# Patient Record
Sex: MALE | Race: WHITE | Employment: OTHER | URBAN - METROPOLITAN AREA
[De-identification: names, ages, dates, MRNs, and addresses within clinical notes are randomized per-mention and may not be internally consistent; named-entity substitution may affect disease eponyms.]

---

## 2017-11-02 ENCOUNTER — OFFICE VISIT (OUTPATIENT)
Dept: FAMILY MEDICINE | Facility: CLINIC | Age: 62
End: 2017-11-02

## 2017-11-02 VITALS
SYSTOLIC BLOOD PRESSURE: 112 MMHG | HEIGHT: 67 IN | DIASTOLIC BLOOD PRESSURE: 68 MMHG | RESPIRATION RATE: 18 BRPM | OXYGEN SATURATION: 97 % | WEIGHT: 240 LBS | TEMPERATURE: 98.6 F | BODY MASS INDEX: 37.67 KG/M2 | HEART RATE: 77 BPM

## 2017-11-02 DIAGNOSIS — R05.9 COUGH: Primary | ICD-10-CM

## 2017-11-02 DIAGNOSIS — J20.9 ACUTE BRONCHITIS, UNSPECIFIED ORGANISM: ICD-10-CM

## 2017-11-02 DIAGNOSIS — H72.91 PERFORATED EAR DRUM, RIGHT: ICD-10-CM

## 2017-11-02 DIAGNOSIS — H91.91 HEARING PROBLEM, RIGHT: ICD-10-CM

## 2017-11-02 PROCEDURE — 99214 OFFICE O/P EST MOD 30 MIN: CPT | Performed by: FAMILY MEDICINE

## 2017-11-02 NOTE — PATIENT INSTRUCTIONS
Acute Bronchitis  Your healthcare provider has told you that you have acute bronchitis. Bronchitis is infection or inflammation of the bronchial tubes (airways in the lungs). Normally, air moves easily in and out of the airways. Bronchitis narrows the airways, making it harder for air to flow in and out of the lungs. This causes symptoms such as shortness of breath, coughing up yellow or green mucus, and wheezing. Bronchitis can be acute or chronic. Acute means the condition comes on quickly and goes away in a short time, usually within 3 to 10 days. Chronic means a condition lasts a long time and often comes back.    What causes acute bronchitis?  Acute bronchitis almost always starts as a viral respiratory infection, such as a cold or the flu. Certain factors make it more likely for a cold or flu to turn into bronchitis. These include being very young, being elderly, having a heart or lung problem, or having a weak immune system. Cigarette smoking also makes bronchitis more likely.  When bronchitis develops, the airways become swollen. The airways may also become infected with bacteria. This is known as a secondary infection.  Diagnosing acute bronchitis  Your healthcare provider will examine you and ask about your symptoms and health history. You may also have a sputum culture to test the fluid in your lungs. Chest X-rays may be done to look for infection in the lungs.  Treating acute bronchitis  Bronchitis usually clears up as the cold or flu goes away. You can help feel better faster by doing the following:    Take medicine as directed. You may be told to take ibuprofen or other over-the-counter medicines. These help relieve inflammation in your bronchial tubes. Your healthcare provider may prescribe an inhaler to help open up the bronchial tubes. Most of the time, acute bronchitis is caused by a viral infection. Antibiotics are usually not prescribed for viral infections.    Drink plenty of fluids, such as  water, juice, or warm soup. Fluids loosen mucus so that you can cough it up. This helps you breathe more easily. Fluids also prevent dehydration.    Make sure you get plenty of rest.    Do not smoke. Do not allow anyone else to smoke in your home.  Recovery and follow-up  Follow up with your doctor as you are told. You will likely feel better in a week or two. But a dry cough can linger beyond that time. Let your doctor know if you still have symptoms (other than a dry cough) after 2 weeks, or if you re prone to getting bronchial infections. Take steps to protect yourself from future infections. These steps include stopping smoking and avoiding tobacco smoke, washing your hands often, and getting a yearly flu shot.  When to call your healthcare provider  Call the healthcare provider if you have any of the following:    Fever of 100.4 F (38.0 C) or higher, or as advised    Symptoms that get worse, or new symptoms    Trouble breathing    Symptoms that don t start to improve within a week, or within 3 days of taking antibiotics   Date Last Reviewed: 12/1/2016 2000-2017 [employer]. [address] [place] [address]. All rights reserved. This information is not intended as a substitute for professional medical care. Always follow your healthcare professional's instructions.        Ruptured Eardrum, Traumatic    The eardrum is a thin membrane about one inch from the opening of the ear canal. It is easily injured by objects put into the ear canal. It may also be injured by a loud noise close to the ear or a slap to the ear. A ruptured eardrum will cause pain. There may be some clear or bloody drainage. A buzzing sound may be heard in the ear. Some hearing may be lost the affected ear.  The goal is to keep the ear dry and clean until the eardrum heals. Antibiotics may be prescribed if infection is present. Follow-up with ear and hearing specialists is advised. In many cases, hearing returns to  normal after the eardrum heals.  Home care    Keep a cotton ball in the ear to keep it clean and protect the inner ear.    Do not use eardrops unless prescribed by the healthcare provider.    Do not get water in your ear when showering or bathing. No swimming until approved by the healthcare provider.    Take any prescription or over-the-counter medicines as advised.  Follow-up care  Follow up with specialists for test or exams as directed. A hearing test should be done soon after the injury. Also follow up within 2 weeks to check healing of the eardrum.  When to seek medical advice  Fever in children:    Child is 3 months old or younger and has a fever of 100.4 F (38 C) or higher. (Get medical care right away. Fever in a young baby can be a sign of a dangerous infection.)    Child is younger than 2 years of age and has a fever of 100.4 F (38 C) that continues for more than 1 day.    Child is 2 years old or older and has a fever of 100.4 F (38 C) that continues for more than 3 days.    Child is of any age and has repeated fevers above 104 F (40 C).  Fever in adults:    Fever of 100.4 F (38 C)  Also call for any of the following:    Fluid drainage from the ear for longer than 24 hours    Increasing ear pain    Worsening headache or dizziness    Worsening hearing loss  Date Last Reviewed: 5/3/2015    7290-5999 [employer]. [address] [place] [address]. All rights reserved. This information is not intended as a substitute for professional medical care. Always follow your healthcare professional's instructions.

## 2017-11-02 NOTE — MR AVS SNAPSHOT
After Visit Summary   11/2/2017    Andre Serrano    MRN: [id number]           Patient Information     Date Of Birth          1955        Visit Information        Provider Department      11/2/2017 11:45 AM Truong Hoyt MD [facility]        Today's Diagnoses     Cough    -  1    Hearing problem, right        Perforated ear drum, right        Acute bronchitis, unspecified organism          Care Instructions      Acute Bronchitis  Your healthcare provider has told you that you have acute bronchitis. Bronchitis is infection or inflammation of the bronchial tubes (airways in the lungs). Normally, air moves easily in and out of the airways. Bronchitis narrows the airways, making it harder for air to flow in and out of the lungs. This causes symptoms such as shortness of breath, coughing up yellow or green mucus, and wheezing. Bronchitis can be acute or chronic. Acute means the condition comes on quickly and goes away in a short time, usually within 3 to 10 days. Chronic means a condition lasts a long time and often comes back.    What causes acute bronchitis?  Acute bronchitis almost always starts as a viral respiratory infection, such as a cold or the flu. Certain factors make it more likely for a cold or flu to turn into bronchitis. These include being very young, being elderly, having a heart or lung problem, or having a weak immune system. Cigarette smoking also makes bronchitis more likely.  When bronchitis develops, the airways become swollen. The airways may also become infected with bacteria. This is known as a secondary infection.  Diagnosing acute bronchitis  Your healthcare provider will examine you and ask about your symptoms and health history. You may also have a sputum culture to test the fluid in your lungs. Chest X-rays may be done to look for infection in the lungs.  Treating acute bronchitis  Bronchitis usually clears up as the cold or flu goes away. You can help  feel better faster by doing the following:    Take medicine as directed. You may be told to take ibuprofen or other over-the-counter medicines. These help relieve inflammation in your bronchial tubes. Your healthcare provider may prescribe an inhaler to help open up the bronchial tubes. Most of the time, acute bronchitis is caused by a viral infection. Antibiotics are usually not prescribed for viral infections.    Drink plenty of fluids, such as water, juice, or warm soup. Fluids loosen mucus so that you can cough it up. This helps you breathe more easily. Fluids also prevent dehydration.    Make sure you get plenty of rest.    Do not smoke. Do not allow anyone else to smoke in your home.  Recovery and follow-up  Follow up with your doctor as you are told. You will likely feel better in a week or two. But a dry cough can linger beyond that time. Let your doctor know if you still have symptoms (other than a dry cough) after 2 weeks, or if you re prone to getting bronchial infections. Take steps to protect yourself from future infections. These steps include stopping smoking and avoiding tobacco smoke, washing your hands often, and getting a yearly flu shot.  When to call your healthcare provider  Call the healthcare provider if you have any of the following:    Fever of 100.4 F (38.0 C) or higher, or as advised    Symptoms that get worse, or new symptoms    Trouble breathing    Symptoms that don t start to improve within a week, or within 3 days of taking antibiotics   Date Last Reviewed: 12/1/2016 2000-2017 [employer]. [address] [place] [address], [place] [address]. All rights reserved. This information is not intended as a substitute for professional medical care. Always follow your healthcare professional's instructions.        Ruptured Eardrum, Traumatic    The eardrum is a thin membrane about one inch from the opening of the ear canal. It is easily injured by objects put into the ear canal. It may  also be injured by a loud noise close to the ear or a slap to the ear. A ruptured eardrum will cause pain. There may be some clear or bloody drainage. A buzzing sound may be heard in the ear. Some hearing may be lost the affected ear.  The goal is to keep the ear dry and clean until the eardrum heals. Antibiotics may be prescribed if infection is present. Follow-up with ear and hearing specialists is advised. In many cases, hearing returns to normal after the eardrum heals.  Home care    Keep a cotton ball in the ear to keep it clean and protect the inner ear.    Do not use eardrops unless prescribed by the healthcare provider.    Do not get water in your ear when showering or bathing. No swimming until approved by the healthcare provider.    Take any prescription or over-the-counter medicines as advised.  Follow-up care  Follow up with specialists for test or exams as directed. A hearing test should be done soon after the injury. Also follow up within 2 weeks to check healing of the eardrum.  When to seek medical advice  Fever in children:    Child is 3 months old or younger and has a fever of 100.4 F (38 C) or higher. (Get medical care right away. Fever in a young baby can be a sign of a dangerous infection.)    Child is younger than 2 years of age and has a fever of 100.4 F (38 C) that continues for more than 1 day.    Child is 2 years old or older and has a fever of 100.4 F (38 C) that continues for more than 3 days.    Child is of any age and has repeated fevers above 104 F (40 C).  Fever in adults:    Fever of 100.4 F (38 C)  Also call for any of the following:    Fluid drainage from the ear for longer than 24 hours    Increasing ear pain    Worsening headache or dizziness    Worsening hearing loss  Date Last Reviewed: 5/3/2015    5922-9942 [employer]. [address] [place] [address], [place] [address]. All rights reserved. This information is not intended as a substitute for professional medical care.  "Always follow your healthcare professional's instructions.                Follow-ups after your visit        Additional Services     Referral to [facility]       Referral to [facility]  Phone:  [phone]  Reason for Referral:  Dr Perez, Dr Schwartz, Dr Ordonez     Please be aware that coverage of these services is subject to the terms and limitations of your health insurance plan.  Call member services at your health plan with any benefit or coverage questions.                  Who to contact     If you have questions or need follow up information about today's clinic visit or your schedule please contact [facility] directly at [phone].  Normal or non-critical lab and imaging results will be communicated to you by [employer], letter or phone within 4 business days after the clinic has received the results. If you do not hear from us within 7 days, please contact the clinic through [employer] or phone. If you have a critical or abnormal lab result, we will notify you by phone as soon as possible.  Submit refill requests through [employer] or call your pharmacy and they will forward the refill request to us. Please allow 3 business days for your refill to be completed.          Additional Information About Your Visit        [employer] Information     [employer] lets you send messages to your doctor, view your test results, renew your prescriptions, schedule appointments and more. To sign up, go to [email or web address] . Click on \"Log in\" on the left side of the screen, which will take you to the Welcome page. Then click on \"Sign up Now\" on the right side of the page.     You will be asked to enter the access code listed below, as well as some personal information. Please follow the directions to create your username and password.     Your access code is: [email or web address]  Expires: 2018 12:33 PM     Your access code will  in 90 days. If " "you need help or a new code, please call your [facility] clinic or [phone].        Care EveryWhere ID     This is your Care EveryWhere ID. This could be used by other organizations to access your [facility] medical records  [id number]        Your Vitals Were     Pulse Temperature Respirations Height Pulse Oximetry BMI (Body Mass Index)    77 98.6  F (37  C) (Oral) 18 1.695 m (5' 6.75\") 97% 37.87 kg/m2       Blood Pressure from Last 3 Encounters:   11/02/17 112/68   06/25/15 98/62   04/18/11 112/66    Weight from Last 3 Encounters:   11/02/17 108.9 kg (240 lb)   06/25/15 104.2 kg (229 lb 12.8 oz)   04/18/11 104.7 kg (230 lb 12.8 oz)              We Performed the Following     Referral to [facility]          Today's Medication Changes          These changes are accurate as of: 11/2/17 12:33 PM.  If you have any questions, ask your nurse or doctor.               Start taking these medicines.        Dose/Directions    amoxicillin-clavulanate 875-125 MG per tablet   Commonly known as:  AUGMENTIN   Used for:  Perforated ear drum, right, Cough, Acute bronchitis, unspecified organism   Started by:  Truong Hoyt MD        Dose:  1 tablet   Take 1 tablet by mouth 2 times daily   Quantity:  28 tablet   Refills:  0            Where to get your medicines      These medications were sent to [facility] #[id number] - [address] [address]     Phone:  [phone]     amoxicillin-clavulanate 875-125 MG per tablet                Primary Care Provider Office Phone # Fax #    Chacho Galan [phone] [address]  [facility] [address]        Equal Access to Services     DAVID AUGUSTINE AH: Enoch salgado Soabraham, waaxda luqadaha, qaybta kaalmafatemeh mora, sanjeev obregon. [facility] [phone].    ATENCIÓN: Si habla español, tiene a juarez disposición servicios " ashli de asistencia lingüística. Linette richey [phone].    We comply with applicable federal civil rights laws and [place] laws. We do not discriminate on the basis of race, color, national origin, age, disability, sex, sexual orientation, or gender identity.            Thank you!     Thank you for choosing [facility]  for your care. Our goal is always to provide you with excellent care. Hearing back from our patients is one way we can continue to improve our services. Please take a few minutes to complete the written survey that you may receive in the mail after your visit with us. Thank you!             Your Updated Medication List - Protect others around you: Learn how to safely use, store and throw away your medicines at www.disposemymeds.org.          This list is accurate as of: 11/2/17 12:33 PM.  Always use your most recent med list.                   Brand Name Dispense Instructions for use Diagnosis    amoxicillin-clavulanate 875-125 MG per tablet    AUGMENTIN    28 tablet    Take 1 tablet by mouth 2 times daily    Perforated ear drum, right, Cough, Acute bronchitis, unspecified organism       atorvastatin 20 MG tablet    LIPITOR    90 tablet    Take one po every pm.    Elevated cholesterol       NO ACTIVE MEDICATIONS

## 2017-11-02 NOTE — PROGRESS NOTES
Subjective:  Here to discuss the status of the following problem(s):(Unless noted the problem(s) is/are stable and if applicable the medicine(s) being used is/are causing no side effects or problems.)    CC: URI (x2 weeks - wet cough, blood (from ears) on pillow when woke up 5 days ago, very stuffy)      1. Cough  Cough nasal discharge that is green and lots of mucous  Gets coughing jags   Severe coughing  Jags  Tried otc cold preps     2. Hearing problem, right  Had blood fronm right ear several days ago     flys in two week to  [place]    ROS:  General:  NEGATIVE for fever, chills, change in weight HEENT:  SEE HPI. Allergies have been an issue before    No past medical history on file.  Current Outpatient Prescriptions   Medication     atorvastatin (LIPITOR) 20 MG tablet     NO ACTIVE MEDICATIONS     No current facility-administered medications for this visit.       reports that he is a non-smoker but has been exposed to tobacco smoke. He has never used smokeless tobacco. He reports that he drinks about 10.0 oz of alcohol per week  He reports that he does not use illicit drugs.      EXAM:  BP: 112/68   Pulse: 77      Wt Readings from Last 2 Encounters:   11/02/17 108.9 kg (240 lb)   06/25/15 104.2 kg (229 lb 12.8 oz)       BMI= Body mass index is 37.87 kg/(m^2).    EXAM:   APPEARANCE: = alert, no distress and slightly [place]  Conj/Eyelids = Nrl  Ears/Nose = Nrl  He can hear not tones with audiometer today  Ear canals and TM's = perforated right tm with dry blood on tm. Left ear has retracted tm but appears clear  Oropharynx = Nrl  Neck = Nrl  Resp effort = Nrl  Breath Sounds = Nrl  Heart Rate, Rythym, & sounds (no Murm)  = Nrl  Digits and Nails =Nrl  SKIN absent significant rashes or lesions = Nrl  Recent/Remote Memory = Nrl  Mood/Affect = Nrl    Assessment/Plan:  Andre was seen today for uri.    Diagnoses and all orders for this visit:        (H72.91) Perforated ear drum, right  Keep water out of ear no  swimming  Plan: Referral to [facility]         Head/Neck Surgery,     (J20.9) Acute bronchitis, unspecified organism  Plan: amoxicillin-clavulanate (AUGMENTIN) 875-125 MG         per tablet      (H91.91) Hearing problem, right and left  Plan: Referral to [facility]         Head/Neck Surgery           He will be flying soon and that is concerning with his hearing loss and this URI. See ent for guidance on above ent issues. He  Agrees.          Truong Hoyt  [facility]

## 2017-11-10 ENCOUNTER — TRANSFERRED RECORDS (OUTPATIENT)
Dept: FAMILY MEDICINE | Facility: CLINIC | Age: 62
End: 2017-11-10

## 2017-11-16 ENCOUNTER — TRANSFERRED RECORDS (OUTPATIENT)
Dept: FAMILY MEDICINE | Facility: CLINIC | Age: 62
End: 2017-11-16

## 2019-09-10 ENCOUNTER — OFFICE VISIT (OUTPATIENT)
Dept: FAMILY MEDICINE | Facility: CLINIC | Age: 64
End: 2019-09-10

## 2019-09-10 VITALS
HEART RATE: 68 BPM | OXYGEN SATURATION: 96 % | SYSTOLIC BLOOD PRESSURE: 110 MMHG | DIASTOLIC BLOOD PRESSURE: 78 MMHG | RESPIRATION RATE: 16 BRPM | WEIGHT: 236.5 LBS | BODY MASS INDEX: 37.32 KG/M2

## 2019-09-10 DIAGNOSIS — R97.20 BPH WITH ELEVATED PSA: ICD-10-CM

## 2019-09-10 DIAGNOSIS — Z13.1 SCREENING FOR DIABETES MELLITUS: ICD-10-CM

## 2019-09-10 DIAGNOSIS — N40.0 BPH WITH ELEVATED PSA: ICD-10-CM

## 2019-09-10 DIAGNOSIS — R35.1 NOCTURIA: ICD-10-CM

## 2019-09-10 DIAGNOSIS — G47.33 OSA (OBSTRUCTIVE SLEEP APNEA): ICD-10-CM

## 2019-09-10 DIAGNOSIS — E66.01 MORBID OBESITY (H): ICD-10-CM

## 2019-09-10 DIAGNOSIS — Z11.59 NEED FOR HEPATITIS C SCREENING TEST: ICD-10-CM

## 2019-09-10 DIAGNOSIS — Z00.00 ROUTINE GENERAL MEDICAL EXAMINATION AT A HEALTH CARE FACILITY: Primary | ICD-10-CM

## 2019-09-10 DIAGNOSIS — M19.90 ARTHRITIS: ICD-10-CM

## 2019-09-10 DIAGNOSIS — Z13.220 SCREENING FOR CHOLESTEROL LEVEL: ICD-10-CM

## 2019-09-10 LAB
BACTERIA URINE: 0
BILIRUB UR QL STRIP: 0
BLOOD URINE DIP: ABNORMAL
CASTS/LPF: 0
COLOR UR: YELLOW
CRYSTAL URINE: 0
EPITHELIAL CELLS - QUEST: ABNORMAL
GLUCOSE UR STRIP-MCNC: 0 MG/DL
KETONES UR QL STRIP: 0
LEUKOCYTE ESTERASE URINE DIP: 0
MUCOUS URINE: 0
NITRITE UR QL STRIP: ABNORMAL
PH UR STRIP: 5.5 PH (ref 5–9)
PROT UR QL: 0 MG/DL (ref ?–0.01)
RBC URINE: ABNORMAL (ref 0–3)
SP GR UR STRIP: 1.01 (ref 1–1.02)
SPERM: PRESENT
UROBILINOGEN UR QL STRIP: 1 EU/DL (ref 0.2–1)
WBC URINE: ABNORMAL (ref 0–3)

## 2019-09-10 PROCEDURE — 99396 PREV VISIT EST AGE 40-64: CPT | Mod: 25 | Performed by: FAMILY MEDICINE

## 2019-09-10 PROCEDURE — 84153 ASSAY OF PSA TOTAL: CPT | Mod: 90 | Performed by: FAMILY MEDICINE

## 2019-09-10 PROCEDURE — 90471 IMMUNIZATION ADMIN: CPT | Performed by: FAMILY MEDICINE

## 2019-09-10 PROCEDURE — 86618 LYME DISEASE ANTIBODY: CPT | Mod: 90 | Performed by: FAMILY MEDICINE

## 2019-09-10 PROCEDURE — 36415 COLL VENOUS BLD VENIPUNCTURE: CPT | Performed by: FAMILY MEDICINE

## 2019-09-10 PROCEDURE — 80053 COMPREHEN METABOLIC PANEL: CPT | Mod: 90 | Performed by: FAMILY MEDICINE

## 2019-09-10 PROCEDURE — 80061 LIPID PANEL: CPT | Mod: 90 | Performed by: FAMILY MEDICINE

## 2019-09-10 PROCEDURE — 86803 HEPATITIS C AB TEST: CPT | Mod: 90 | Performed by: FAMILY MEDICINE

## 2019-09-10 PROCEDURE — 90686 IIV4 VACC NO PRSV 0.5 ML IM: CPT | Performed by: FAMILY MEDICINE

## 2019-09-10 PROCEDURE — 81003 URINALYSIS AUTO W/O SCOPE: CPT | Performed by: FAMILY MEDICINE

## 2019-09-10 RX ORDER — TAMSULOSIN HYDROCHLORIDE 0.4 MG/1
0.4 CAPSULE ORAL DAILY
Qty: 30 CAPSULE | Refills: 0 | Status: SHIPPED | OUTPATIENT
Start: 2019-09-10 | End: 2020-03-20

## 2019-09-10 NOTE — PROGRESS NOTES
SUBJECTIVE:   CC: Andre Serrano is an 63 year old male who presents for preventive health visit.     Healthy Habits:    Do you get at least three servings of calcium containing foods daily (dairy, green leafy vegetables, etc.)? yes    Amount of exercise or daily activities, outside of work: 7 day(s) per week    Problems taking medications regularly No    Medication side effects: No    Have you had an eye exam in the past two years? no    Do you see a dentist twice per year? yes    Do you have sleep apnea, excessive snoring or daytime drowsiness?yes, Apnea    Today's PHQ-2 Score:   PHQ-2 ( 1999 [employer]izer) 6/25/2015   Q1: Little interest or pleasure in doing things 0   Q2: Feeling down, depressed or hopeless 0   PHQ-2 Score 0       Abuse: Current or Past(Physical, Sexual or Emotional)- No  Do you feel safe in your environment? Yes    Social History     Tobacco Use     Smoking status: Passive Smoke Exposure - Never Smoker     Smokeless tobacco: Never Used     Tobacco comment: occasion   Substance Use Topics     Alcohol use: Yes     Alcohol/week: 10.0 oz     Types: 20 Cans of beer per week     If you drink alcohol do you typically have >3 drinks per day or >7 drinks per week? No                      Last PSA: No results found for: PSA    Reviewed orders with patient. Reviewed health maintenance and updated orders accordingly - Yes  Labs reviewed in EPIC    Reviewed and updated as needed this visit by clinical staff       Reviewed and updated as needed this visit by Provider        ROS:  CONSTITUTIONAL: NEGATIVE for fever, chills, change in weight  INTEGUMENTARY/SKIN: NEGATIVE for worrisome rashes, moles or lesions  EYES: NEGATIVE for vision changes or irritation  ENT: NEGATIVE for ear, mouth and throat problems  RESP: NEGATIVE for significant cough or SOB  CV: NEGATIVE for chest pain, palpitations or peripheral edema  GI: NEGATIVE for nausea, abdominal pain, heartburn, or change in bowel habits  : + abn curvature,  "nocturia  MUSCULOSKELETAL:POSITIVE  for arthralgias joints  NEURO: NEGATIVE for weakness, dizziness or paresthesias  PSYCHIATRIC: NEGATIVE for changes in mood or affect    OBJECTIVE:   /78   Pulse 68   Resp 16   Wt 107.3 kg (236 lb 8 oz)   SpO2 96%   BMI 37.32 kg/m    EXAM:  GENERAL: healthy, alert and no distress  EYES: Eyes grossly normal to inspection, PERRL and conjunctivae and sclerae normal  HENT: ear canals and TM's normal, nose and mouth without ulcers or lesions  NECK: no adenopathy, no asymmetry, masses, or scars and thyroid normal to palpation  RESP: lungs clear to auscultation - no rales, rhonchi or wheezes  CV: regular rate and rhythm, normal S1 S2, no S3 or S4, no murmur, click or rub, no peripheral edema and peripheral pulses strong  ABDOMEN: soft, nontender, no hepatosplenomegaly, no masses and bowel sounds normal  MS: no gross musculoskeletal defects noted, no edema; boggy swollen knees  SKIN: no suspicious lesions or rashes  NEURO: Normal strength and tone, mentation intact and speech normal  PSYCH: mentation appears normal, affect normal/bright    Diagnostic Test Results:  Labs reviewed in Epic    ASSESSMENT/PLAN:       ICD-10-CM    1. Routine general medical examination at a health care facility Z00.00    2. Arthritis M19.90    3. History of elevated PSA Z87.898    4. Screening for cholesterol level Z13.220    5. Screening for diabetes mellitus Z13.1    6. Need for hepatitis C screening test Z11.59      COUNSELING:  Reviewed preventive health counseling, as reflected in patient instructions  Special attention given to:        Regular exercise       Healthy diet/nutrition    Estimated body mass index is 37.87 kg/m  as calculated from the following:    Height as of 11/2/17: 1.695 m (5' 6.75\").    Weight as of 11/2/17: 108.9 kg (240 lb).    Weight management plan: Patient referred to endocrine and/or weight management specialty     reports that he is a non-smoker but has been exposed to " tobacco smoke. He has never used smokeless tobacco.    Knee xrays ordered    Counseling Resources:  ATP IV Guidelines  Pooled Cohorts Equation Calculator  FRAX Risk Assessment  ICSI Preventive Guidelines  Dietary Guidelines for Americans, 2010  USDA's MyPlate  ASA Prophylaxis  Lung CA Screening    Anatoliy Claudio MD  [facility]

## 2019-09-12 LAB
ALBUMIN SERPL-MCNC: 4.5 G/DL (ref 3.6–4.8)
ALBUMIN/GLOB SERPL: 2 {RATIO} (ref 1.2–2.2)
ALP SERPL-CCNC: 58 IU/L (ref 39–117)
ALT SERPL-CCNC: 20 IU/L (ref 0–44)
AST SERPL-CCNC: 16 IU/L (ref 0–40)
BILIRUB SERPL-MCNC: 0.7 MG/DL (ref 0–1.2)
BUN SERPL-MCNC: 19 MG/DL (ref 8–27)
BUN/CREATININE RATIO: 24 (ref 10–24)
CALCIUM SERPL-MCNC: 9.5 MG/DL (ref 8.6–10.2)
CHLORIDE SERPLBLD-SCNC: 99 MMOL/L (ref 96–106)
CHOLEST SERPL-MCNC: 213 MG/DL (ref 100–199)
CREAT SERPL-MCNC: 0.8 MG/DL (ref 0.76–1.27)
EGFR IF AFRICN AM: 109 ML/MIN/1.73
EGFR IF NONAFRICN AM: 94 ML/MIN/1.73
GLOBULIN, TOTAL: 2.2 G/DL (ref 1.5–4.5)
GLUCOSE SERPL-MCNC: 105 MG/DL (ref 65–99)
HCV AB SERPL QL IA: <0.1 S/CO RATIO (ref 0–0.9)
HDLC SERPL-MCNC: 56 MG/DL
LDL/HDL RATIO: 2.3 RATIO (ref 0–3.6)
LDLC SERPL CALC-MCNC: 128 MG/DL (ref 0–99)
LYME IGG/IGM AB: <0.91 ISR (ref 0–0.9)
Lab: NO GROWTH
POTASSIUM SERPL-SCNC: 4.3 MMOL/L (ref 3.5–5.2)
PROT SERPL-MCNC: 6.7 G/DL (ref 6–8.5)
PSA NG/ML: 147.5 NG/ML (ref 0–4)
SODIUM SERPL-SCNC: 137 MMOL/L (ref 134–144)
TOTAL CO2: 21 MMOL/L (ref 20–29)
TRIGL SERPL-MCNC: 143 MG/DL (ref 0–149)
URINE CULTURE: NORMAL
VLDLC SERPL CALC-MCNC: 29 MG/DL (ref 5–40)

## 2019-09-16 ENCOUNTER — TRANSFERRED RECORDS (OUTPATIENT)
Dept: FAMILY MEDICINE | Facility: CLINIC | Age: 64
End: 2019-09-16

## 2019-09-17 ENCOUNTER — TELEPHONE (OUTPATIENT)
Dept: FAMILY MEDICINE | Facility: CLINIC | Age: 64
End: 2019-09-17

## 2019-09-25 NOTE — TELEPHONE ENCOUNTER
Patient returned phone call, results discussed per Dr Claudio. Patient advised to call and schedule appointment with urologist to evaluate increased PSA-he will call to do this. Dr Kolb contact information given to patient. Neda Stevenson

## 2020-01-21 ENCOUNTER — TRANSFERRED RECORDS (OUTPATIENT)
Dept: FAMILY MEDICINE | Facility: CLINIC | Age: 65
End: 2020-01-21

## 2020-03-20 ENCOUNTER — APPOINTMENT (OUTPATIENT)
Dept: CT IMAGING | Facility: CLINIC | Age: 65
DRG: 715 | End: 2020-03-20
Attending: NURSE PRACTITIONER
Payer: COMMERCIAL

## 2020-03-20 ENCOUNTER — HOSPITAL ENCOUNTER (INPATIENT)
Facility: CLINIC | Age: 65
LOS: 3 days | Discharge: HOME HEALTH | DRG: 715 | End: 2020-03-23
Attending: EMERGENCY MEDICINE | Admitting: HOSPITALIST
Payer: COMMERCIAL

## 2020-03-20 ENCOUNTER — OFFICE VISIT (OUTPATIENT)
Dept: FAMILY MEDICINE | Facility: CLINIC | Age: 65
End: 2020-03-20

## 2020-03-20 ENCOUNTER — APPOINTMENT (OUTPATIENT)
Dept: GENERAL RADIOLOGY | Facility: CLINIC | Age: 65
DRG: 715 | End: 2020-03-20
Attending: HOSPITALIST
Payer: COMMERCIAL

## 2020-03-20 VITALS
RESPIRATION RATE: 20 BRPM | SYSTOLIC BLOOD PRESSURE: 140 MMHG | WEIGHT: 238 LBS | HEIGHT: 67 IN | TEMPERATURE: 98.7 F | DIASTOLIC BLOOD PRESSURE: 90 MMHG | BODY MASS INDEX: 37.35 KG/M2

## 2020-03-20 DIAGNOSIS — N13.39 OTHER HYDRONEPHROSIS: Primary | ICD-10-CM

## 2020-03-20 DIAGNOSIS — R11.2 NAUSEA AND VOMITING, INTRACTABILITY OF VOMITING NOT SPECIFIED, UNSPECIFIED VOMITING TYPE: Primary | ICD-10-CM

## 2020-03-20 DIAGNOSIS — R10.9 FLANK PAIN: ICD-10-CM

## 2020-03-20 DIAGNOSIS — R59.1 LYMPHADENOPATHY: ICD-10-CM

## 2020-03-20 DIAGNOSIS — N17.9 ACUTE RENAL FAILURE, UNSPECIFIED ACUTE RENAL FAILURE TYPE (H): ICD-10-CM

## 2020-03-20 DIAGNOSIS — K59.00 CONSTIPATION, UNSPECIFIED CONSTIPATION TYPE: ICD-10-CM

## 2020-03-20 DIAGNOSIS — R33.9 URINARY RETENTION: ICD-10-CM

## 2020-03-20 PROBLEM — N13.30 HYDRONEPHROSIS: Status: ACTIVE | Noted: 2020-03-20

## 2020-03-20 LAB
% GRANULOCYTES: 86.5 % (ref 42.2–75.2)
A/C RATIO MG/G: ABNORMAL MG/G
ALBUMIN (URINE) MG/L: 10 MG/L
ALBUMIN SERPL-MCNC: 3.4 G/DL (ref 3.4–5)
ALBUMIN UR-MCNC: NEGATIVE MG/DL
ALP SERPL-CCNC: 83 U/L (ref 40–150)
ALT SERPL W P-5'-P-CCNC: 14 U/L (ref 0–70)
ANION GAP SERPL CALCULATED.3IONS-SCNC: 10 MMOL/L (ref 3–14)
APPEARANCE UR: CLEAR
AST SERPL W P-5'-P-CCNC: 8 U/L (ref 0–45)
BACTERIA URINE: NORMAL
BILIRUB SERPL-MCNC: 0.7 MG/DL (ref 0.2–1.3)
BILIRUB UR QL STRIP: NEGATIVE
BILIRUB UR QL STRIP: NORMAL
BLOOD URINE DIP: NORMAL
BUN SERPL-MCNC: 78 MG/DL (ref 7–30)
CALCIUM SERPL-MCNC: 8.9 MG/DL (ref 8.5–10.1)
CASTS/LPF: NORMAL
CHLORIDE SERPL-SCNC: 100 MMOL/L (ref 94–109)
CO2 SERPL-SCNC: 23 MMOL/L (ref 20–32)
COLOR UR AUTO: ABNORMAL
COLOR UR: YELLOW
CREAT SERPL-MCNC: 9.29 MG/DL (ref 0.66–1.25)
CRYSTAL URINE: NORMAL
EPITHELIAL CELLS - QUEST: NORMAL
GFR SERPL CREATININE-BSD FRML MDRD: 5 ML/MIN/{1.73_M2}
GLUCOSE SERPL-MCNC: 112 MG/DL (ref 70–99)
GLUCOSE UR STRIP-MCNC: NEGATIVE MG/DL
GLUCOSE UR STRIP-MCNC: NORMAL MG/DL
HCT VFR BLD AUTO: 40.9 % (ref 39–51)
HEMOGLOBIN: 14 G/DL (ref 13.4–17.5)
HGB UR QL STRIP: ABNORMAL
INTERPRETATION: ABNORMAL
KETONES UR QL STRIP: NORMAL
KETONES UR STRIP-MCNC: NEGATIVE MG/DL
LEUKOCYTE ESTERASE UR QL STRIP: NEGATIVE
LEUKOCYTE ESTERASE URINE DIP: NORMAL
LIPASE SERPL-CCNC: 198 U/L (ref 73–393)
LYMPHOCYTES NFR BLD AUTO: 9.8 % (ref 20.5–51.1)
MCH RBC QN AUTO: 32.3 PG (ref 27–31)
MCHC RBC AUTO-ENTMCNC: 34.2 G/DL (ref 33–37)
MCV RBC AUTO: 94.5 FL (ref 80–100)
MONOCYTES NFR BLD AUTO: 3.7 % (ref 1.7–9.3)
MUCOUS URINE: NORMAL
NITRATE UR QL: NEGATIVE
NITRITE UR QL STRIP: NORMAL
PH UR STRIP: 5.5 PH (ref 5–7)
PH UR STRIP: 6 PH (ref 5–9)
PLATELET # BLD AUTO: 306 K/UL (ref 140–450)
POTASSIUM SERPL-SCNC: 4.7 MMOL/L (ref 3.4–5.3)
PROT SERPL-MCNC: 7.1 G/DL (ref 6.8–8.8)
PROT UR QL: NORMAL MG/DL (ref ?–0.01)
RBC # BLD AUTO: 4.32 X10/CMM (ref 4.2–5.9)
RBC #/AREA URNS AUTO: 40 /HPF (ref 0–2)
RBC URINE: 0 (ref 0–3)
SODIUM SERPL-SCNC: 133 MMOL/L (ref 133–144)
SOURCE: ABNORMAL
SP GR UR STRIP: 1 (ref 1–1.02)
SP GR UR STRIP: 1.01 (ref 1–1.03)
URINE CREATININE MG/DL - QUEST: 50 MG/DL
UROBILINOGEN UR QL STRIP: 0.2 EU/DL (ref 0.2–1)
UROBILINOGEN UR STRIP-MCNC: NORMAL MG/DL (ref 0–2)
WBC # BLD AUTO: 14.6 X10/CMM (ref 3.8–11)
WBC #/AREA URNS AUTO: 2 /HPF (ref 0–5)
WBC URINE: NORMAL (ref 0–3)

## 2020-03-20 PROCEDURE — 80053 COMPREHEN METABOLIC PANEL: CPT | Mod: 90 | Performed by: NURSE PRACTITIONER

## 2020-03-20 PROCEDURE — 25800030 ZZH RX IP 258 OP 636: Performed by: HOSPITALIST

## 2020-03-20 PROCEDURE — 96365 THER/PROPH/DIAG IV INF INIT: CPT

## 2020-03-20 PROCEDURE — 71045 X-RAY EXAM CHEST 1 VIEW: CPT

## 2020-03-20 PROCEDURE — 25000128 H RX IP 250 OP 636: Performed by: NURSE PRACTITIONER

## 2020-03-20 PROCEDURE — 36415 COLL VENOUS BLD VENIPUNCTURE: CPT | Performed by: NURSE PRACTITIONER

## 2020-03-20 PROCEDURE — 81003 URINALYSIS AUTO W/O SCOPE: CPT | Performed by: NURSE PRACTITIONER

## 2020-03-20 PROCEDURE — 12000000 ZZH R&B MED SURG/OB

## 2020-03-20 PROCEDURE — 83690 ASSAY OF LIPASE: CPT | Performed by: NURSE PRACTITIONER

## 2020-03-20 PROCEDURE — 51702 INSERT TEMP BLADDER CATH: CPT

## 2020-03-20 PROCEDURE — 85025 COMPLETE CBC W/AUTO DIFF WBC: CPT | Performed by: NURSE PRACTITIONER

## 2020-03-20 PROCEDURE — 74176 CT ABD & PELVIS W/O CONTRAST: CPT

## 2020-03-20 PROCEDURE — 74018 RADEX ABDOMEN 1 VIEW: CPT | Mod: FY | Performed by: NURSE PRACTITIONER

## 2020-03-20 PROCEDURE — 99223 1ST HOSP IP/OBS HIGH 75: CPT | Mod: AI | Performed by: HOSPITALIST

## 2020-03-20 PROCEDURE — 25000132 ZZH RX MED GY IP 250 OP 250 PS 637: Performed by: HOSPITALIST

## 2020-03-20 PROCEDURE — 99214 OFFICE O/P EST MOD 30 MIN: CPT | Performed by: NURSE PRACTITIONER

## 2020-03-20 PROCEDURE — 96375 TX/PRO/DX INJ NEW DRUG ADDON: CPT

## 2020-03-20 PROCEDURE — 80053 COMPREHEN METABOLIC PANEL: CPT | Performed by: NURSE PRACTITIONER

## 2020-03-20 PROCEDURE — 82570 ASSAY OF URINE CREATININE: CPT | Mod: 90 | Performed by: NURSE PRACTITIONER

## 2020-03-20 PROCEDURE — 81001 URINALYSIS AUTO W/SCOPE: CPT | Performed by: NURSE PRACTITIONER

## 2020-03-20 PROCEDURE — 82044 UR ALBUMIN SEMIQUANTITATIVE: CPT | Performed by: NURSE PRACTITIONER

## 2020-03-20 PROCEDURE — 99285 EMERGENCY DEPT VISIT HI MDM: CPT | Mod: 25

## 2020-03-20 PROCEDURE — 87086 URINE CULTURE/COLONY COUNT: CPT | Performed by: HOSPITALIST

## 2020-03-20 RX ORDER — AMOXICILLIN 250 MG
2 CAPSULE ORAL 2 TIMES DAILY PRN
Status: DISCONTINUED | OUTPATIENT
Start: 2020-03-20 | End: 2020-03-23 | Stop reason: HOSPADM

## 2020-03-20 RX ORDER — CEFTRIAXONE 1 G/1
1 INJECTION, POWDER, FOR SOLUTION INTRAMUSCULAR; INTRAVENOUS EVERY 24 HOURS
Status: DISCONTINUED | OUTPATIENT
Start: 2020-03-21 | End: 2020-03-23

## 2020-03-20 RX ORDER — ATROPA BELLADONNA AND OPIUM 16.2; 3 MG/1; MG/1
30 SUPPOSITORY RECTAL EVERY 6 HOURS PRN
Status: DISCONTINUED | OUTPATIENT
Start: 2020-03-20 | End: 2020-03-23 | Stop reason: HOSPADM

## 2020-03-20 RX ORDER — NALOXONE HYDROCHLORIDE 0.4 MG/ML
.1-.4 INJECTION, SOLUTION INTRAMUSCULAR; INTRAVENOUS; SUBCUTANEOUS
Status: DISCONTINUED | OUTPATIENT
Start: 2020-03-20 | End: 2020-03-23 | Stop reason: HOSPADM

## 2020-03-20 RX ORDER — AMOXICILLIN 250 MG
1 CAPSULE ORAL 2 TIMES DAILY PRN
Status: DISCONTINUED | OUTPATIENT
Start: 2020-03-20 | End: 2020-03-23 | Stop reason: HOSPADM

## 2020-03-20 RX ORDER — AMOXICILLIN 250 MG
1 CAPSULE ORAL 2 TIMES DAILY
Status: DISCONTINUED | OUTPATIENT
Start: 2020-03-20 | End: 2020-03-23 | Stop reason: HOSPADM

## 2020-03-20 RX ORDER — HYDROMORPHONE HYDROCHLORIDE 1 MG/ML
0.5 INJECTION, SOLUTION INTRAMUSCULAR; INTRAVENOUS; SUBCUTANEOUS
Status: DISCONTINUED | OUTPATIENT
Start: 2020-03-20 | End: 2020-03-20

## 2020-03-20 RX ORDER — BISACODYL 10 MG
10 SUPPOSITORY, RECTAL RECTAL DAILY PRN
Status: DISCONTINUED | OUTPATIENT
Start: 2020-03-20 | End: 2020-03-23 | Stop reason: HOSPADM

## 2020-03-20 RX ORDER — SODIUM CHLORIDE 9 MG/ML
INJECTION, SOLUTION INTRAVENOUS CONTINUOUS
Status: DISCONTINUED | OUTPATIENT
Start: 2020-03-20 | End: 2020-03-22

## 2020-03-20 RX ORDER — ONDANSETRON 2 MG/ML
4 INJECTION INTRAMUSCULAR; INTRAVENOUS EVERY 6 HOURS PRN
Status: DISCONTINUED | OUTPATIENT
Start: 2020-03-20 | End: 2020-03-23 | Stop reason: HOSPADM

## 2020-03-20 RX ORDER — METOCLOPRAMIDE HYDROCHLORIDE 5 MG/5ML
2.5 SOLUTION ORAL EVERY 6 HOURS PRN
Status: DISCONTINUED | OUTPATIENT
Start: 2020-03-20 | End: 2020-03-23 | Stop reason: HOSPADM

## 2020-03-20 RX ORDER — METOCLOPRAMIDE HYDROCHLORIDE 5 MG/ML
2.5 INJECTION INTRAMUSCULAR; INTRAVENOUS EVERY 6 HOURS PRN
Status: DISCONTINUED | OUTPATIENT
Start: 2020-03-20 | End: 2020-03-23 | Stop reason: HOSPADM

## 2020-03-20 RX ORDER — ONDANSETRON 4 MG/1
4 TABLET, ORALLY DISINTEGRATING ORAL EVERY 6 HOURS PRN
Status: DISCONTINUED | OUTPATIENT
Start: 2020-03-20 | End: 2020-03-23 | Stop reason: HOSPADM

## 2020-03-20 RX ORDER — LIDOCAINE 40 MG/G
CREAM TOPICAL
Status: DISCONTINUED | OUTPATIENT
Start: 2020-03-20 | End: 2020-03-23 | Stop reason: HOSPADM

## 2020-03-20 RX ORDER — CEFTRIAXONE 1 G/1
1 INJECTION, POWDER, FOR SOLUTION INTRAMUSCULAR; INTRAVENOUS ONCE
Status: COMPLETED | OUTPATIENT
Start: 2020-03-20 | End: 2020-03-20

## 2020-03-20 RX ORDER — PROCHLORPERAZINE MALEATE 5 MG
5 TABLET ORAL EVERY 6 HOURS PRN
Status: DISCONTINUED | OUTPATIENT
Start: 2020-03-20 | End: 2020-03-23 | Stop reason: HOSPADM

## 2020-03-20 RX ORDER — AMOXICILLIN 250 MG
2 CAPSULE ORAL 2 TIMES DAILY
Status: DISCONTINUED | OUTPATIENT
Start: 2020-03-20 | End: 2020-03-23 | Stop reason: HOSPADM

## 2020-03-20 RX ORDER — PROCHLORPERAZINE 25 MG
12.5 SUPPOSITORY, RECTAL RECTAL EVERY 12 HOURS PRN
Status: DISCONTINUED | OUTPATIENT
Start: 2020-03-20 | End: 2020-03-23 | Stop reason: HOSPADM

## 2020-03-20 RX ADMIN — SODIUM CHLORIDE, PRESERVATIVE FREE: 5 INJECTION INTRAVENOUS at 20:02

## 2020-03-20 RX ADMIN — SENNOSIDES AND DOCUSATE SODIUM 2 TABLET: 8.6; 5 TABLET ORAL at 20:02

## 2020-03-20 RX ADMIN — CEFTRIAXONE SODIUM 1 G: 1 INJECTION, POWDER, FOR SOLUTION INTRAMUSCULAR; INTRAVENOUS at 18:03

## 2020-03-20 RX ADMIN — HYDROMORPHONE HYDROCHLORIDE 0.5 MG: 1 INJECTION, SOLUTION INTRAMUSCULAR; INTRAVENOUS; SUBCUTANEOUS at 17:46

## 2020-03-20 ASSESSMENT — MIFFLIN-ST. JEOR
SCORE: 1823.19
SCORE: 1825.45

## 2020-03-20 ASSESSMENT — ENCOUNTER SYMPTOMS
CONSTIPATION: 1
ABDOMINAL PAIN: 1
NAUSEA: 1
CHILLS: 1
VOMITING: 1
SORE THROAT: 0
HEMATURIA: 0
COUGH: 0
SHORTNESS OF BREATH: 0
DIARRHEA: 0
FEVER: 0
BACK PAIN: 1

## 2020-03-20 ASSESSMENT — ACTIVITIES OF DAILY LIVING (ADL): ADLS_ACUITY_SCORE: 15

## 2020-03-20 NOTE — ED PROVIDER NOTES
"  History     Chief Complaint:  Abdominal Pain      HPI   Andre Serrano is a 64 year old male with a history of alcohol abuse who presents to the emergency department for evaluation of abdominal pain. The patient was recently in clinic and had blood work and an abdominal X ray performed. He was then sent here. The patient reports abdominal pain and left flank pain since Monday (3/16). The patient does have some urinary symptoms (unspecific), back pain, flank pain, constipation, chills, nausea, and vomiting. He has never had a kidney stone. The patient denies fever, sore throat, cough, shortness of breath, chest pain, ,hematuria or diarrhea. The patient has 3 beers and a few shots a day. He states that he has not drank in about a week.    Allergies:  No known drug allergies    Medications:    Lipitor  Flomax    Past Medical History:    FRANK  Obesity  Elevated PSA  Alcohol abuse  Obese    Past Surgical History:    The patient does not have any pertinent past surgical history.    Family History:    No past pertinent family history.    Social History:  The patient presents today alone.  Passive smoker exposure  Positive for alcohol use.   Positive for drug use.  Marital Status:  Single    Review of Systems   Constitutional: Positive for chills. Negative for fever.   HENT: Negative for sore throat.    Respiratory: Negative for cough and shortness of breath.    Cardiovascular: Negative for chest pain.   Gastrointestinal: Positive for abdominal pain, constipation, nausea and vomiting. Negative for diarrhea.   Genitourinary: Negative for hematuria.   Musculoskeletal: Positive for back pain.   All other systems reviewed and are negative.        Physical Exam     Patient Vitals for the past 24 hrs:   BP Temp Temp src Heart Rate Resp SpO2 Height Weight   03/20/20 1805 (!) 202/117 -- -- -- 20 99 % -- --   03/20/20 1550 (!) 182/100 98.6  F (37  C) Oral 80 18 99 % 1.727 m (5' 8\") 106.6 kg (235 lb)     Physical " Exam  General: Resting on the gurney   Eyes:  Conjunctivae and sclerae are normal  ENT:    The nose is normal    Pinnae are normal    The oropharynx is normal  Neck:  Normal range of motion    There is no rigidity noted  CV:  Regular rate and rhythm     No edema  Resp:  Lungs are clear    Non-labored    No rales    No wheezing   GI:  Abdomen is soft, there is no rigidity    Denies any CVA tenderness    Denies any blood in urine    No rebound tenderness     C/O constipation  MS:  Normal muscular tone  Skin:  No rash or acute skin lesions noted  Neuro:   Awake, alert.      Speech is normal and fluent.    Face is symmetric.     Moves all extremities  Psych:  Normal affect.  Appropriate interactions.      Emergency Department Course         Imaging:  CT Abdomen Pelvis w/o Contrast   Final Result   IMPRESSION:    1.  Moderate bilateral hydroureteronephrosis extending to the level of   the distal ureters without focal transition point. There is moderate   urinary bladder retention and the hydronephrosis may be due to urinary   bladder outflow compromise/obstruction from the prostate (high   suspicion for prostate malignancy).   2.  Asymmetric left nephric stranding and edema is concerning for   superimposed ascending urinary tract infection.   3.  Retroperitoneal and iliac lymphadenopathy consistent with doc   metastatic disease and is favored to be from a primary prostate   malignancy. Additional differential considerations include rectal   malignancy, lymphoma, urothelial malignancy given the irregular   thickening in the posterior urinary bladder wall that is inseparable   from the prostate. Note that multiple lymph nodes are adjacent to the   ureters, however there is no high-grade ureteral caliber change to   suggest that the lymphadenopathy is a source of the obstruction.   4.  Nodularity in the mesial rectal fascia and lymphadenopathy along   the inferior mesenteric vasculature, is concerning for a component of    extramural vascular invasion.   5.  Sclerotic osseous metastatic disease.   6.  A few micronodules in the visualized lung bases, pulmonary   metastatic disease is not excluded.      MALCOLM ALANIS MD          Laboratory:  Labs Ordered and Resulted from Time of ED Arrival Up to the Time of Departure from the ED   COMPREHENSIVE METABOLIC PANEL - Abnormal; Notable for the following components:       Result Value    Glucose 112 (*)     Urea Nitrogen 78 (*)     Creatinine 9.29 (*)     GFR Estimate 5 (*)     GFR Estimate If Black 6 (*)     All other components within normal limits   LIPASE   UA MACROSCOPIC WITH REFLEX TO MICRO AND CULTURE   PERIPHERAL IV CATHETER   IP ACUTE INDWELLING URINARY CATHETER ([place])     Interventions:    Emergency Department Course:         Impression & Plan      Medical Decision Making:  Andre Serrano is a 64 year old male who presents to the emergency department for evaluation of abdominal pain, left flank pain, and decreased urinary output.  Patient has had an enlarged prostate with urinary retention and incomplete emptying.  He was going to follow up with [facility] for a recheck in the next month.  Differential diagnosis may include: diverticulitis, aneurysm, urinary retention, ureterolithiasis, UTI, pyelonephritis.   A urinalysis was obtained and sent to the lab and the patient was started on IV antibiotics for asymmetric left nephric stranding and edema that is concerning for superimposed ascending urinary tract infection found on CT.  Patient will be admitted to inpatient med bed for further workup.  Patient agrees to the plan.    Spoke with Dr. Summers for inpatient admission.    Diagnosis:    ICD-10-CM    1. Urinary retention  R33.9 UA reflex to Microscopic and Culture   2. Lymphadenopathy  R59.1        Disposition:  Admitted to med bed    Discharge Medications:  New Prescriptions    No medications on file       Camilo De Leon  3/20/2020    EMERGENCY DEPARTMENT        Divya Palencia, ANA  03/21/20 2100

## 2020-03-20 NOTE — PHARMACY-ADMISSION MEDICATION HISTORY
Pharmacy Medication History  Admission medication history interview status for the 3/20/2020  admission is complete. See EPIC admission navigator for prior to admission medications     Medication history sources: Patient  Medication history source reliability: Good  Adherence assessment: Unable to assess    Significant changes made to the medication list:  -Removed atorvastatin and tamsulosin as patient reports no longer taking. He was prescribed years ago and did not refill.    Additional medication history information:   -He reports taking 1 tab Aleve on most days.    Medication reconciliation completed by provider prior to medication history? No    Time spent in this activity: 5 min      Prior to Admission medications    Medication Sig Last Dose Taking? Auth Provider   Naproxen Sodium (ALEVE PO) Take 1 tablet by mouth daily as needed for moderate pain Past Week at Unknown time Yes Unknown, Entered By History

## 2020-03-20 NOTE — LETTER
Pt will use [facility] (NOT [facility] [place] - insurance not accepted with [facility]).

## 2020-03-20 NOTE — ED PROVIDER NOTES
Emergency Department Attending Supervision Note  3/20/2020  4:00 PM      I evaluated this patient in conjunction with Divya Palencia NP    Briefly, the patient presented for evaluation of abdominal pain primarily in the left side.  He was referred here from urgent care.  He has had symptoms for about 4 days.  His urination does not feel quite right, and he also has left flank pain as well as some nonbloody vomiting a few days ago.  No history of urinary problems or kidney stones.  No hematuria.  He drinks alcohol daily.  Urinalysis done in urgent care earlier today did not show compelling signs of infection.    On my exam, he has tenderness and fullness in the suprapubic region and less so to the left abdomen.  No fever.  CT shows evidence of urinary obstruction and urinary retention, and possible prostate cancer as well as haziness around the left kidney.  ED course:    My impression is that his symptoms are likely from prostate malignancy causing obstruction, urinary retention, and possibly infection as well.  Broad-spectrum antibiotics have been initiated.  He has profound new renal failure, likely from this obstructive process, though his electrolytes are satisfactory and there is no indication for emergent nephrology consultation.  Pride catheter has been placed to decompress his bladder.  He requires further multidisciplinary care here in the hospital which has been arranged after discussion with the hospitalist.        Diagnosis    ICD-10-CM    1. Urinary retention  R33.9 UA reflex to Microscopic and Culture   2. Lymphadenopathy  R59.1    3. Acute renal failure, unspecified acute renal failure type (H)  N17.9          Rafi Coon MD    This record was created at least in part using electronic voice recognition software, so please excuse any typographical errors.           Rafi Coon MD  03/20/20 1917

## 2020-03-20 NOTE — PROGRESS NOTES
Problem(s) Oriented visit        SUBJECTIVE:                                                    Andre Serrano is a 64 year old male who presents to clinic today for the following health issues :    Seen today for abdominal discomfort, nausea, vomiting and constipation for about two weeks. Andre reports he started to feel better but in the last few days his symptoms have come back and are worse. He is experiencing flank pain, loss of appetite, and chills without fever.     Last PSA on 09/10/2019 is 147.5, Andre reports he did not yet follow up with urology about this lab and has not been seen for follow up since the labs appointment.     About five beers and some liquor almost every days, didn't drink for about five days after episode, felt better and started again. Denies dependence or withdrawal symptoms.      Problem list, Medication list, Allergies, and Medical/Social/Surgical histories reviewed in EPIC and updated as appropriate.   Additional history: as documented    ROS:  General:  POSITIVE  for anorexia, chills, malaise and sweats and NEGATIVE  for fever CV:  NEGATIVE for chest pain, palpitations or peripheral edema Resp:  NEGATIVE for significant cough or SOB GI: nausea, vomiting, constipation and bowel habit changes  (male):  decreased urinary stream and frequency Musculoskeletal:  No significant muscle or joint pains  Neurologic: No headaches, numbness, tingling, or weakness    Histories:   Patient Active Problem List   Diagnosis     Family history of melanoma     History of elevated PSA     Alcohol abuse     Obese     FRANK (obstructive sleep apnea)     Obesity (BMI 35.0-39.9) with comorbidity (H)     Hydronephrosis     Past Surgical History:   Procedure Laterality Date     APPENDECTOMY  1972     HERNIORRHAPHY INGUINAL INFANT         Social History     Tobacco Use     Smoking status: Passive Smoke Exposure - Never Smoker     Smokeless tobacco: Never Used     Tobacco comment: occasion   Substance Use  "Topics     Alcohol use: Yes     Alcohol/week: 16.7 standard drinks     Types: 20 Cans of beer per week     Family History   Problem Relation Age of Onset     Prostate Cancer Father      Brain Cancer Father      Uterine Cancer Mother      Coronary Artery Disease Maternal Grandfather      Diabetes No family hx of          Current Outpatient Medications   Medication Sig Dispense Refill     senna-docusate (SENOKOT-S/PERICOLACE) 8.6-50 MG tablet Take 2 tablets by mouth 2 times daily 60 tablet 1       OBJECTIVE:                                                    BP (!) 140/90   Temp 98.7  F (37.1  C)   Resp 20   Ht 1.702 m (5' 7\")   Wt 108 kg (238 lb)   BMI 37.28 kg/m    Body mass index is 37.28 kg/m .   APPEARANCE: = alert, moderate distress, pale and fatigued  Conj/Eyelids = noninjected and lids and lashes are without inflammation  PERRLA/Irises = Pupils Round Reactive to Light and Irisis without inflammation  Lips/Teeth/Gums = No lesions seen, good dentition, and gums seem healthy  Oropharynx = No leukoplakia, No injection to the tissues, Normal Uvula  Resp effort = Calm regular breathing  Breath Sounds = Good air movement with no rales or rhonchi in any lung fields  Heart Rate, Rhythm, & sounds (no Murm)  = Regular rate and rhythm with no S3, S4, or murmur appreciated.  Abdomen: soft, nontender, no masses, & bowel sounds = tenderness moderate generalized, mass, located suprapubic, CVA tenderness  Liver/Spleen = Normal span and no splenomegaly noted  Ext (edema) = No pretibial edema noted or elsewhere  Musculsktl =  Strength and ROM of major joints are within normal limits  Recent/Remote Memory = Alert and Oriented x 3  Mood/Affect = Cooperative and interested     ASSESSMENT/PLAN:                                                        Diagnoses and all orders for this visit:    Nausea and vomiting, intractability of vomiting not specified, unspecified vomiting type  -     Urinalysis w/reflex protein, bili " ([facility])    Flank pain  -     CBC with Diff/Plt ([facility])  -     Comp. Metabolic Panel (14) ([facility])  -     Microalbumin ([facility])    Constipation, unspecified constipation type  -     X-ray Abdomen AP view (xray machine broke after fist image, incomplete image)    I am concerned for possible bowel obstruction due to metastatic prostate cancer. I would like you to follow up in the emergency room immediately.     Patient needs assistance with ADLs: none identified today  Patient needs assistance with iADLs: none identified today    The following health maintenance items are reviewed in Epic and correct as of today:  Health Maintenance   Topic Date Due     ADVANCE CARE PLANNING  1955     HIV SCREENING  09/18/1969     FALL RISK ASSESSMENT  09/18/2019     PNEUMOCOCCAL IMMUNIZATION 65+ LOW/MEDIUM RISK (1 of 2 - PCV13) 09/18/2019     PHQ-2  01/01/2020     MEDICARE ANNUAL WELLNESS VISIT  09/10/2020     COLORECTAL CANCER SCREENING  01/21/2023     LIPID  09/10/2024     DTAP/TDAP/TD IMMUNIZATION (2 - Td) 06/25/2025     HEPATITIS C SCREENING  Completed     INFLUENZA VACCINE  Completed     ZOSTER IMMUNIZATION  Completed     AORTIC ANEURYSM SCREENING (SYSTEM ASSIGNED)  Completed     IPV IMMUNIZATION  Aged Out     MENINGITIS IMMUNIZATION  Aged Out           Hyun Spears NP  [facility]  Family Practice  [facility]  [phone]    For any issues my office # is [phone]

## 2020-03-20 NOTE — PROGRESS NOTES
RECEIVING UNIT ED HANDOFF REVIEW    ED Nurse Handoff Report was reviewed by: Mali Juarez, RN on March 20, 2020 at 6:47 PM

## 2020-03-20 NOTE — H&P
[facility]    History and Physical - Hospitalist Service       Date of Admission:  3/20/2020    Assessment & Plan   Ander Serrano is a 64 year old male with a history of elevated PSA and lower urinary tract symptoms, who has been having more difficulty urinating recently.  He went to his clinic today with abdominal and left flank pain. He was referred to the [facility] due to elevated creatinine.  BMP here showed creatinine =9.  Urinanalysis was unrevealing but CT findings are consistent with obstruction from prostate cancer and possible left pyelonephritis.  He is afebrile and hemodynamically stable.  Serum potassium is 4.7.  A Pride catheter was placed and he is being admitted for further evaluation.     Moderate bilateral hydroureteronephrosis   Acute kidney injury, obstructive   Mild nodular enlargement of the prostate   Asymmetric left nephric stranding ?pyelonephritis   Retroperitoneal and iliac lymphadenopathy   History of elevated PSA and LUTS  Findings consistent with obstruction due to prostate cancer. A Pride catheter has been placed with subsequently good urine output.  He is receiving ceftriaxone.      Continue Pride catheter     Continue intravenous ceftriaxone    Intravenous fluid     Repeat labs tomorrow    Consult the urologist     Alcohol use disorder    Observe for withdrawal- defer CIWA protocol for now    History of hypercholesterolemia   He had been on a statin but stopped taking it.    Follow up with primary care provider      Diet: regular   DVT Prophylaxis: Pneumatic Compression Devices  Pride Catheter: in place, indication:    Code Status: Full     Disposition Plan   Expected discharge: 2 - 3 days, recommended to prior living arrangement once treated for urinary obstruction .  Entered: Bimal Summers MD 03/20/2020, 6:27 PM     The patient's care was discussed with the Patient.    Bimal Summers MD  [facility]  Hospital    ______________________________________________________________________    Chief Complaint   Flank pain     History is obtained from the patient and emergency department physician    History of Present Illness   Andre Serrano is a 64 year old male with a history of elevated PSA and lower urinary tract symptoms, who has been having more difficulty urinating recently.  He went to his clinic today with abdominal and left flank pain. He was referred to the [facility] ED due to elevated creatinine.  BMP here showed creatinine =9.  Urinanalysis was unrevealing but CT findings are consistent with obstruction from prostate cancer and possible left pyelonephritis.      CT abdomen and pelvis   1.  Moderate bilateral hydroureteronephrosis extending to the level of  the distal ureters without focal transition point. There is moderate  urinary bladder retention and the hydronephrosis may be due to urinary  bladder outflow compromise/obstruction from the prostate (high  suspicion for prostate malignancy).  2.  Asymmetric left nephric stranding and edema is concerning for  superimposed ascending urinary tract infection.  3.  Retroperitoneal and iliac lymphadenopathy consistent with doc  metastatic disease and is favored to be from a primary prostate  malignancy. Additional differential considerations include rectal  malignancy, lymphoma, urothelial malignancy given the irregular  thickening in the posterior urinary bladder wall that is inseparable  from the prostate. Note that multiple lymph nodes are adjacent to the  ureters, however there is no high-grade ureteral caliber change to  suggest that the lymphadenopathy is a source of the obstruction.  4.  Nodularity in the mesial rectal fascia and lymphadenopathy along  the inferior mesenteric vasculature, is concerning for a component of  extramural vascular invasion.  5.  Sclerotic osseous metastatic disease.  6.  A few micronodules in the visualized lung bases,  pulmonary  metastatic disease is not excluded.    He is afebrile and hemodynamically stable.  Serum potassium is 4.7.  A Pride catheter was placed and he is being admitted for further evaluation.     Review of Systems    The 10 point Review of Systems is negative other than noted in the HPI or here.     Past Medical History    I have reviewed this patient's medical history and updated it with pertinent information if needed.   Past Medical History:   Diagnosis Date     Heavy alcohol use      Hypercholesteremia      Lower urinary tract symptoms (LUTS)        Past Surgical History   I have reviewed this patient's surgical history and updated it with pertinent information if needed.  Past Surgical History:   Procedure Laterality Date     APPENDECTOMY  1972     HERNIORRHAPHY INGUINAL INFANT         Social History   I have reviewed this patient's social history and updated it with pertinent information if needed.  Social History     Tobacco Use     Smoking status: Passive Smoke Exposure - Never Smoker     Smokeless tobacco: Never Used     Tobacco comment: occasion   Substance Use Topics     Alcohol use: Yes     Alcohol/week: 16.7 standard drinks     Types: 20 Cans of beer per week     Drug use: Yes     Types: Marijuana       Family History   I have reviewed this patient's family history and updated it with pertinent information if needed.   Family History   Problem Relation Age of Onset     Prostate Cancer Father      Brain Cancer Father      Uterine Cancer Mother      Coronary Artery Disease Maternal Grandfather      Diabetes No family hx of        Prior to Admission Medications   Prior to Admission Medications   Prescriptions Last Dose Informant Patient Reported? Taking?   Naproxen Sodium (ALEVE PO) Past Week at Unknown time  Yes Yes   Sig: Take 1 tablet by mouth daily as needed for moderate pain      Facility-Administered Medications: None     Allergies   No Known Allergies    Physical Exam   Vital Signs: Temp: 98.6   F (37  C) Temp src: Oral BP: (!) 202/117   Heart Rate: 80 Resp: 20 SpO2: 99 % O2 Device: None (Room air)    Weight: 235 lbs 0 oz    Constitutional: awake, alert, cooperative, no apparent distress  Eyes: Lids and lashes normal, pupils equal, round, sclera clear, conjunctiva normal  ENT: Normocephalic, without obvious abnormality, atraumatic  Respiratory: No increased work of breathing, good air exchange, clear to auscultation bilaterally, no crackles or wheezing  Cardiovascular: regular rate and rhythm, normal S1 and S2, no S3 or S4, and no murmur noted  GI: normal bowel sounds, soft, non-distended, non-tender, no masses palpated  Genitounirinary:  +Pride   Skin: no rashes and no jaundice  Musculoskeletal: There is no redness, warmth, or swelling of the joints.  Full range of motion noted.  Motor strength is 5 out of 5 all extremities bilaterally.  Tone is normal.  Neurologic: Awake, alert, oriented to name, place and time.  Cranial nerves II-XII are grossly intact.  Motor is 5 out of 5 bilaterally.  Neuropsychiatric: General: normal, calm and normal eye contact    Data   Data reviewed today: I reviewed all medications, new labs and imaging results over the last 24 hours. I personally reviewed no images or EKG's today.    Recent Labs   Lab 03/20/20  1634 03/20/20  1455   WBC  --  14.6*   HGB  --  14.0   MCV  --  94.5   PLT  --  306     --    POTASSIUM 4.7  --    CHLORIDE 100  --    CO2 23  --    BUN 78*  --    CR 9.29*  --    ANIONGAP 10  --    JAXSON 8.9  --    *  --    ALBUMIN 3.4  --    PROTTOTAL 7.1  --    BILITOTAL 0.7  --    ALKPHOS 83  --    ALT 14  --    AST 8  --    LIPASE 198  --      Recent Results (from the past 24 hour(s))   CT Abdomen Pelvis w/o Contrast    Narrative    CT ABDOMEN PELVIS W/O CONTRAST 3/20/2020 4:56 PM    CLINICAL HISTORY: left flank pain with nausea and vomiting  TECHNIQUE: CT scan of the abdomen and pelvis was performed without IV  contrast. Multiplanar reformats were  obtained. Dose reduction  techniques were used.  CONTRAST: None.    COMPARISON: None.    FINDINGS:   LOWER CHEST: Coronary artery calcifications are noted. There are  micronodules in the visualized lung bases    HEPATOBILIARY: Unenhanced contours unremarkable. Gallbladder  unremarkable.    PANCREAS: Unremarkable.    SPLEEN: Spleen is normal in size.    ADRENAL GLANDS: Unenhanced contours are unremarkable.    KIDNEYS/BLADDER: Moderate bilateral hydroureteronephrosis with a  hydroureter extending to the distal aspects without evidence of  urolithiasis. There is a moderate asymmetric left periureteral and  perinephric stranding without loculated fluid collection. Moderate  distention of the urinary bladder. There is mild nodular enlargement  of the prostate that indents the base of the urinary bladder with  irregular layering hyperdensity in the dependent urinary bladder that  is inseparable from the nodular protrusion of the prostate as seen on  series 4/image 184.    BOWEL: No bowel obstruction. Mild distal colonic diverticulosis  without acute diverticulitis.    LYMPH NODES: Diffuse retroperitoneal and bilateral iliac chain  lymphadenopathy. Left paraortic lymph node at the level of the left  kidney measures 2.1 x 2.8 cm series 4/image 89, left external iliac  lymph node measures 1.9 x 3.1 cm series 4/image 153. There are  multiple lymph nodes adjacent to the inferior mesenteric vasculature  measuring 1.4 x 1.6 cm series 4/image 110. There are numerous  bilateral internal iliac lymphadenopathy and tubular nodularity in the  mesorectal fascia.    VASCULATURE: Abdominal aorta normal in caliber with mild calcified  atheromatous plaque.    PELVIC ORGANS: Nodular enlargement of the prostate indenting the base  of the urinary bladder.    OTHER: Mild presacral edema. No pneumoperitoneum.    MUSCULOSKELETAL: There are multiple faint sclerotic lesions in the  visualized spine and pelvis, most prominent in the  subtrochanteric  right proximal femur, intertrochanteric left proximal femur,  subchondral left femoral head.      Impression    IMPRESSION:   1.  Moderate bilateral hydroureteronephrosis extending to the level of  the distal ureters without focal transition point. There is moderate  urinary bladder retention and the hydronephrosis may be due to urinary  bladder outflow compromise/obstruction from the prostate (high  suspicion for prostate malignancy).  2.  Asymmetric left nephric stranding and edema is concerning for  superimposed ascending urinary tract infection.  3.  Retroperitoneal and iliac lymphadenopathy consistent with doc  metastatic disease and is favored to be from a primary prostate  malignancy. Additional differential considerations include rectal  malignancy, lymphoma, urothelial malignancy given the irregular  thickening in the posterior urinary bladder wall that is inseparable  from the prostate. Note that multiple lymph nodes are adjacent to the  ureters, however there is no high-grade ureteral caliber change to  suggest that the lymphadenopathy is a source of the obstruction.  4.  Nodularity in the mesial rectal fascia and lymphadenopathy along  the inferior mesenteric vasculature, is concerning for a component of  extramural vascular invasion.  5.  Sclerotic osseous metastatic disease.  6.  A few micronodules in the visualized lung bases, pulmonary  metastatic disease is not excluded.    MALCOLM ALANIS MD

## 2020-03-20 NOTE — ED TRIAGE NOTES
Pt reports onset of abdominal pain/L flank pain on Monday. Pt reports going to UC today and was referred to ED.

## 2020-03-20 NOTE — ED NOTES
"[facility]  ED Nurse Handoff Report    ED Chief complaint: Abdominal Pain      ED Diagnosis:   Final diagnoses:   Urinary retention   Lymphadenopathy       Code Status: Full Code    Allergies: No Known Allergies    Patient Story: Left flank pain since Monday  Focused Assessment:  Presents to ED from clinic for left flank pain that started on Monday 3/16.  Does have a Hx of ETOH usage.  Does have some problems urinating along with back and left flank pain currently.  Denies any n/v/d.    Does have some constipation as well.  Has not drank in about a week.    Treatments and/or interventions provided: saline lock right AC, Dilaudid 0.5 mg, 16 F cooper cath, [place] 1 Gm IV  Patient's response to treatments and/or interventions: does have some ureteral discomfort, hospitalist is aware.  There is drainage from cooper.    To be done/followed up on inpatient unit:  none at time of shed note writing    Does this patient have any cognitive concerns?: none    Activity level - Baseline/Home:  Independent  Activity Level - Current:   Independent    Patient's Preferred language: English   Needed?: No    Isolation: None  Infection: Not Applicable  Bariatric?: No    Vital Signs:   Vitals:    03/20/20 1550 03/20/20 1805   BP: (!) 182/100 (!) 202/117   Resp: 18 20   Temp: 98.6  F (37  C)    TempSrc: Oral    SpO2: 99% 99%   Weight: 106.6 kg (235 lb)    Height: 1.727 m (5' 8\")        Cardiac Rhythm:     Was the PSS-3 completed:   Yes  What interventions are required if any?               Family Comments: none present  OBS brochure/video discussed/provided to patient/family: N/A              Name of person given brochure if not patient: n/a              Relationship to patient: n/a    For the majority of the shift this patient's behavior was Green.   Behavioral interventions performed were basic cares.    ED NURSE PHONE NUMBER: *[id number]         "

## 2020-03-21 LAB
ALBUMIN SERPL-MCNC: 4.2 G/DL (ref 3.8–4.8)
ALBUMIN/GLOB SERPL: 1.9 {RATIO} (ref 1.2–2.2)
ALP SERPL-CCNC: 83 IU/L (ref 39–117)
ALT SERPL-CCNC: 8 IU/L (ref 0–44)
ANION GAP SERPL CALCULATED.3IONS-SCNC: 7 MMOL/L (ref 3–14)
AST SERPL-CCNC: 9 IU/L (ref 0–40)
BILIRUB SERPL-MCNC: 0.4 MG/DL (ref 0–1.2)
BUN SERPL-MCNC: 43 MG/DL (ref 7–30)
BUN SERPL-MCNC: 68 MG/DL (ref 8–27)
BUN/CREATININE RATIO: 9 (ref 10–24)
CALCIUM SERPL-MCNC: 9 MG/DL (ref 8.6–10.2)
CALCIUM SERPL-MCNC: 9.1 MG/DL (ref 8.5–10.1)
CHLORIDE SERPL-SCNC: 105 MMOL/L (ref 94–109)
CHLORIDE SERPLBLD-SCNC: 97 MMOL/L (ref 96–106)
CO2 SERPL-SCNC: 23 MMOL/L (ref 20–32)
CREAT SERPL-MCNC: 3.09 MG/DL (ref 0.66–1.25)
CREAT SERPL-MCNC: 7.79 MG/DL (ref 0.76–1.27)
EGFR IF AFRICN AM: 8 ML/MIN/1.73
EGFR IF NONAFRICN AM: 7 ML/MIN/1.73
ERYTHROCYTE [DISTWIDTH] IN BLOOD BY AUTOMATED COUNT: 12.1 % (ref 10–15)
GFR SERPL CREATININE-BSD FRML MDRD: 20 ML/MIN/{1.73_M2}
GLOBULIN, TOTAL: 2.2 G/DL (ref 1.5–4.5)
GLUCOSE SERPL-MCNC: 113 MG/DL (ref 65–99)
GLUCOSE SERPL-MCNC: 168 MG/DL (ref 70–99)
HCT VFR BLD AUTO: 40.8 % (ref 40–53)
HGB BLD-MCNC: 14.4 G/DL (ref 13.3–17.7)
MCH RBC QN AUTO: 33.8 PG (ref 26.5–33)
MCHC RBC AUTO-ENTMCNC: 35.3 G/DL (ref 31.5–36.5)
MCV RBC AUTO: 96 FL (ref 78–100)
PLATELET # BLD AUTO: 292 10E9/L (ref 150–450)
POTASSIUM SERPL-SCNC: 5 MMOL/L (ref 3.4–5.3)
POTASSIUM SERPL-SCNC: 5 MMOL/L (ref 3.5–5.2)
PROT SERPL-MCNC: 6.4 G/DL (ref 6–8.5)
PSA SERPL-MCNC: 713 UG/L (ref 0–4)
RBC # BLD AUTO: 4.26 10E12/L (ref 4.4–5.9)
SODIUM SERPL-SCNC: 135 MMOL/L (ref 133–144)
SODIUM SERPL-SCNC: 135 MMOL/L (ref 134–144)
TOTAL CO2: 18 MMOL/L (ref 20–29)
WBC # BLD AUTO: 14.7 10E9/L (ref 4–11)

## 2020-03-21 PROCEDURE — 93005 ELECTROCARDIOGRAM TRACING: CPT

## 2020-03-21 PROCEDURE — 25000132 ZZH RX MED GY IP 250 OP 250 PS 637: Performed by: HOSPITALIST

## 2020-03-21 PROCEDURE — 36415 COLL VENOUS BLD VENIPUNCTURE: CPT | Performed by: HOSPITALIST

## 2020-03-21 PROCEDURE — 93010 ELECTROCARDIOGRAM REPORT: CPT | Performed by: INTERNAL MEDICINE

## 2020-03-21 PROCEDURE — 85027 COMPLETE CBC AUTOMATED: CPT | Performed by: HOSPITALIST

## 2020-03-21 PROCEDURE — 25800030 ZZH RX IP 258 OP 636: Performed by: HOSPITALIST

## 2020-03-21 PROCEDURE — 99232 SBSQ HOSP IP/OBS MODERATE 35: CPT | Performed by: HOSPITALIST

## 2020-03-21 PROCEDURE — 12000000 ZZH R&B MED SURG/OB

## 2020-03-21 PROCEDURE — 80048 BASIC METABOLIC PNL TOTAL CA: CPT | Performed by: HOSPITALIST

## 2020-03-21 PROCEDURE — 25000128 H RX IP 250 OP 636: Performed by: HOSPITALIST

## 2020-03-21 PROCEDURE — 84153 ASSAY OF PSA TOTAL: CPT | Performed by: HOSPITALIST

## 2020-03-21 PROCEDURE — 99232 SBSQ HOSP IP/OBS MODERATE 35: CPT | Performed by: UROLOGY

## 2020-03-21 RX ORDER — HYDRALAZINE HYDROCHLORIDE 20 MG/ML
10 INJECTION INTRAMUSCULAR; INTRAVENOUS EVERY 4 HOURS PRN
Status: DISCONTINUED | OUTPATIENT
Start: 2020-03-21 | End: 2020-03-23 | Stop reason: HOSPADM

## 2020-03-21 RX ORDER — ACETAMINOPHEN 325 MG/1
325 TABLET ORAL EVERY 4 HOURS PRN
Status: DISCONTINUED | OUTPATIENT
Start: 2020-03-21 | End: 2020-03-23 | Stop reason: HOSPADM

## 2020-03-21 RX ADMIN — ACETAMINOPHEN 325 MG: 325 TABLET, FILM COATED ORAL at 16:23

## 2020-03-21 RX ADMIN — SODIUM CHLORIDE, PRESERVATIVE FREE: 5 INJECTION INTRAVENOUS at 06:15

## 2020-03-21 RX ADMIN — SODIUM CHLORIDE, PRESERVATIVE FREE: 5 INJECTION INTRAVENOUS at 16:23

## 2020-03-21 RX ADMIN — ACETAMINOPHEN 325 MG: 325 TABLET, FILM COATED ORAL at 20:46

## 2020-03-21 RX ADMIN — HYDRALAZINE HYDROCHLORIDE 10 MG: 20 INJECTION INTRAMUSCULAR; INTRAVENOUS at 12:12

## 2020-03-21 RX ADMIN — CEFTRIAXONE SODIUM 1 G: 1 INJECTION, POWDER, FOR SOLUTION INTRAMUSCULAR; INTRAVENOUS at 18:12

## 2020-03-21 RX ADMIN — SENNOSIDES AND DOCUSATE SODIUM 2 TABLET: 8.6; 5 TABLET ORAL at 08:39

## 2020-03-21 RX ADMIN — ACETAMINOPHEN 325 MG: 325 TABLET, FILM COATED ORAL at 12:15

## 2020-03-21 RX ADMIN — SENNOSIDES AND DOCUSATE SODIUM 2 TABLET: 8.6; 5 TABLET ORAL at 20:46

## 2020-03-21 ASSESSMENT — ACTIVITIES OF DAILY LIVING (ADL)
ADLS_ACUITY_SCORE: 11

## 2020-03-21 ASSESSMENT — MIFFLIN-ST. JEOR: SCORE: 1769.66

## 2020-03-21 NOTE — PROGRESS NOTES
Admission    Patient arrives to room 636-1 via cart from ED.  Care plan note: VSS on RA elevated /96. Reg Diet. SBA    Inpatient nursing criteria listed below were met:    PCD's Documented: NA  Skin issues/needs documented :NA  Isolation education started/completed NA  Patient allergies verified with patient: Yes  Verified completion of [place] Risk Assessment Tool:  Yes  Verified completion of Guardianship screening tool: Yes  Fall Prevention: Care plan updated, Education given and documented NA  Care Plan initiated: Yes  Home medications documented in belongings flowsheet: NA  Patient belongings documented in belongings flowsheet: Yes  Reminder note (belongings/ medications) placed in discharge instructions:NA  Admission profile/ required documentation complete: NA  Bedside Report Letter given and explained to patient NA

## 2020-03-21 NOTE — PROGRESS NOTES
[facility]    Medicine Progress Note - Hospitalist Service       Date of Admission:  3/20/2020  Assessment & Plan      Moderate bilateral hydroureteronephrosis   Acute kidney injury, obstructive from most likely metastatic prostate cancer  Findings consistent with obstruction due to prostate cancer. A Pride catheter has been placed with subsequently good urine output.  He is receiving ceftriaxone for a possible UTI.   Seen by urology and oncology    Continue Pride catheter     Continue intravenous ceftriaxone    Intravenous fluid will continue for now    Repeat labs tomorrow    Consult the urologist and hematology    Possibly prostate biopsy on Monday     Alcohol use disorder    Observe for withdrawal- defer CIWA protocol for now     History of hypercholesterolemia   He had been on a statin but stopped taking it.    Follow up with primary care provider     Diet: Combination Diet Regular Diet Adult    DVT Prophylaxis: Pneumatic Compression Devices  Pride Catheter: in place, indication: Obstruction  Code Status: Full Code      Disposition Plan   Expected discharge: 2 - 3 days, recommended to prior living arrangement once plan finalized.  Entered: Luis Carlos Russell DO 03/21/2020, 2:57 PM       The patient's care was discussed with the Patient.    Luis Carlos Russell DO  Hospitalist Service  [facility]    ______________________________________________________________________    Interval History   No acute events, feeling much better. Aware he might have prostate cancer.     Data reviewed today: I reviewed all medications, new labs and imaging results over the last 24 hours. I personally reviewed no images or EKG's today.    Physical Exam   Vital Signs: Temp: 98.5  F (36.9  C) Temp src: Oral BP: (!) 158/88 Pulse: 63 Heart Rate: 85 Resp: 18 SpO2: 95 % O2 Device: None (Room air)    Weight: 222 lbs 11.2 oz  Constitutional: awake, alert, cooperative, no apparent distress  Respiratory: No  increased work of breathing, good air exchange, clear to auscultation bilaterally, no crackles or wheezing  Cardiovascular: regular rate and rhythm, normal S1 and S2, no S3 or S4, and no murmur noted  GI: normal bowel sounds, soft, non-distended, non-tender, no masses palpated  Genitounirinary:  +Pride   Skin: no rashes and no jaundice    Data   Recent Labs   Lab 03/21/20  0945 03/20/20  1634 03/20/20  1508 03/20/20  1455   WBC 14.7*  --   --  14.6*   HGB 14.4  --   --  14.0   MCV 96  --   --  94.5     --   --  306    133 135  --    POTASSIUM 5.0 4.7 5.0  --    CHLORIDE 105 100 97  --    CO2 23 23  --   --    BUN 43* 78* 68*  9*  --    CR 3.09* 9.29* 7.79*  --    ANIONGAP 7 10  --   --    JAXSON 9.1 8.9 9.0  --    * 112* 113*  --    ALBUMIN  --  3.4 4.2  --    PROTTOTAL  --  7.1 6.4  --    BILITOTAL  --  0.7 0.4  --    ALKPHOS  --  83 83  --    ALT  --  14 8  --    AST  --  8 9  --    LIPASE  --  198  --   --      Recent Results (from the past 24 hour(s))   CT Abdomen Pelvis w/o Contrast    Narrative    CT ABDOMEN PELVIS W/O CONTRAST 3/20/2020 4:56 PM    CLINICAL HISTORY: left flank pain with nausea and vomiting  TECHNIQUE: CT scan of the abdomen and pelvis was performed without IV  contrast. Multiplanar reformats were obtained. Dose reduction  techniques were used.  CONTRAST: None.    COMPARISON: None.    FINDINGS:   LOWER CHEST: Coronary artery calcifications are noted. There are  micronodules in the visualized lung bases    HEPATOBILIARY: Unenhanced contours unremarkable. Gallbladder  unremarkable.    PANCREAS: Unremarkable.    SPLEEN: Spleen is normal in size.    ADRENAL GLANDS: Unenhanced contours are unremarkable.    KIDNEYS/BLADDER: Moderate bilateral hydroureteronephrosis with a  hydroureter extending to the distal aspects without evidence of  urolithiasis. There is a moderate asymmetric left periureteral and  perinephric stranding without loculated fluid collection. Moderate  distention of  the urinary bladder. There is mild nodular enlargement  of the prostate that indents the base of the urinary bladder with  irregular layering hyperdensity in the dependent urinary bladder that  is inseparable from the nodular protrusion of the prostate as seen on  series 4/image 184.    BOWEL: No bowel obstruction. Mild distal colonic diverticulosis  without acute diverticulitis.    LYMPH NODES: Diffuse retroperitoneal and bilateral iliac chain  lymphadenopathy. Left paraortic lymph node at the level of the left  kidney measures 2.1 x 2.8 cm series 4/image 89, left external iliac  lymph node measures 1.9 x 3.1 cm series 4/image 153. There are  multiple lymph nodes adjacent to the inferior mesenteric vasculature  measuring 1.4 x 1.6 cm series 4/image 110. There are numerous  bilateral internal iliac lymphadenopathy and tubular nodularity in the  mesorectal fascia.    VASCULATURE: Abdominal aorta normal in caliber with mild calcified  atheromatous plaque.    PELVIC ORGANS: Nodular enlargement of the prostate indenting the base  of the urinary bladder.    OTHER: Mild presacral edema. No pneumoperitoneum.    MUSCULOSKELETAL: There are multiple faint sclerotic lesions in the  visualized spine and pelvis, most prominent in the subtrochanteric  right proximal femur, intertrochanteric left proximal femur,  subchondral left femoral head.      Impression    IMPRESSION:   1.  Moderate bilateral hydroureteronephrosis extending to the level of  the distal ureters without focal transition point. There is moderate  urinary bladder retention and the hydronephrosis may be due to urinary  bladder outflow compromise/obstruction from the prostate (high  suspicion for prostate malignancy).  2.  Asymmetric left nephric stranding and edema is concerning for  superimposed ascending urinary tract infection.  3.  Retroperitoneal and iliac lymphadenopathy consistent with doc  metastatic disease and is favored to be from a primary  prostate  malignancy. Additional differential considerations include rectal  malignancy, lymphoma, urothelial malignancy given the irregular  thickening in the posterior urinary bladder wall that is inseparable  from the prostate. Note that multiple lymph nodes are adjacent to the  ureters, however there is no high-grade ureteral caliber change to  suggest that the lymphadenopathy is a source of the obstruction.  4.  Nodularity in the mesial rectal fascia and lymphadenopathy along  the inferior mesenteric vasculature, is concerning for a component of  extramural vascular invasion.  5.  Sclerotic osseous metastatic disease.  6.  A few micronodules in the visualized lung bases, pulmonary  metastatic disease is not excluded.    MALCOLM ALANIS MD   XR Chest Port 1 View    Narrative    EXAM: XR CHEST PORT 1 VW  LOCATION: [facility]  DATE/TIME: 3/20/2020 10:11 PM    INDICATION: Postoperative evaluation.    COMPARISON: None.    FINDINGS: Upright portable chest. The heart size is normal. The lungs are clear. No pneumothorax.      Impression    IMPRESSION: No acute abnormality.     Medications     sodium chloride 100 mL/hr at 03/21/20 0615       cefTRIAXone  1 g Intravenous Q24H     senna-docusate  1 tablet Oral BID    Or     senna-docusate  2 tablet Oral BID     sodium chloride (PF)  3 mL Intracatheter Q8H

## 2020-03-21 NOTE — PROGRESS NOTES
"UROLOGY BRIEF NOTE  65 Y/O MALE, ADMITTED IN URINARY RETENTION, RENAL FAILURE. CREAT >9. .  SEEN BY DR APPIAH  WITH NEG PROSTATE BX 8 YR AGO. LOST TO FOLLOWUP SECONDARY TO CHANGE IN INSURANCE AND PATIENTS DECISION.   C.T. SCAN LARGE ,MULTIPLE SUSPICIOUS PELVIC L.N.'S. BILATERAL HYDRO.  PATIENT FEEL \"GREAT\". MITCHELL WITH MILD HEMATURIA.NO BONE PAIN. STABLE WEIGHT.  A- URINARY RETENTION      RENAL FAILURE-CREAT >9      ELEVATED PSA-SUSPECTED CAP      BILATERAL HYDRO- RETENTION/DISTENDED BLADDER      PELVIC LYMPHADENOPATHY-PROBABLE MET. CAP  P- STABILIZE, WATCH FOR POST OBSTRUCTIVE DIURESIS       FOLLOW CREAT IF DOESN'T IMPROVE MAY NEED STENTS       WILL NEED PROSTATE BX TO CONFIRM       BONE SCAN       WAS SCHEDULED TO SEE SB APPIAH ON 3/31  FOLLOWING.       "

## 2020-03-21 NOTE — PLAN OF CARE
DATE & TIME: 3/20/20, 7911-4673   Cognitive Concerns/ Orientation : A&O x 4   BEHAVIOR & AGGRESSION TOOL COLOR: Green  CIWA SCORE: N/a    ABNL VS/O2: BP elevated to 180/102; hydralazine IV prn given with subsequent /88.    MOBILITY: SBA; walked vences x 1  PAIN MANAGMENT: tylenol and heat application for low back pain with relief.  DIET: Regular; Fair intake  BOWEL/BLADDER: Pride in place; urine is pink to red with frequent small clots  ABNL LAB/BG: creat improved to 3.09, wbc 14.7  DRAIN/DEVICES: PIV infusing at 100 ml/hr.   TELEMETRY RHYTHM: N/a  SKIN: scabbed  TESTS/PROCEDURES: CT guided biopsy and bone scan ordered/pending.  D/C DAY/GOALS/PLACE: Pending  OTHER IMPORTANT INFO: Urology consulted; informed patient of diagnosis, patient accepting; Oncology consulted.

## 2020-03-21 NOTE — PLAN OF CARE
DATE & TIME: 3/20/20, 6460 - 2663   Cognitive Concerns/ Orientation : A&O x 4   BEHAVIOR & AGGRESSION TOOL COLOR: Green  CIWA SCORE: N/a   ABNL VS/O2: BP elevated. Other VSS on room air  MOBILITY: SBA  PAIN MANAGMENT: [place]  DIET: Regular  BOWEL/BLADDER: Pride in place with large amount of red/pink urine with small clots  ABNL LAB/BG: N/a  DRAIN/DEVICES: PIV infusing at 100 ml/hr  TELEMETRY RHYTHM: N/a  SKIN: scabbed  TESTS/PROCEDURES: N/a  D/C DAY/GOALS/PLACE: Pending  OTHER IMPORTANT INFO: Urology consulted.

## 2020-03-21 NOTE — CONSULTS
Patient is a 64-year-old male with a PSA of 147.5 last September.  He is now admitted with urinary retention and abdominal discomfort and CT scan shows probable metastases to bone and lymph nodes.  The patient claims to have seen Dr. Amrik Paris 5 years ago and is scheduled to see Dr. Paris next week for [id number] has been referred by Dr. Chacho Galan.  Unfortunately, the patient has not had a digital rectal exam in the past 5 years by anyone.  Patient was aware of his elevated PSA but decided to visit his mother for 2-1/2 months and is now coming back to [facility].  Other past medical history: Heavy alcohol use, high cholesterol, appendectomy, inguinal herniorrhaphy as a child  Labs: Normal electrolytes, creatinine 9.29, elevated white count, hemoglobin 14, platelets 306,000.  Urinalysis normal  Exam: Has indwelling Pride with clear urine.  Alert and oriented, normal appearance.  Normal sphincter tone, no rectal mass or impaction, hard and irregular prostate particularly right base and mid gland.  Clinical stage T3  Assessment: Acute renal failure, urinary retention with hydronephrosis, elevated PSA and hard prostate with CT findings suggest metastatic adenocarcinoma the prostate  Plan: Notify Dr. Paris.  Ask medical oncology to become involved

## 2020-03-21 NOTE — PROVIDER NOTIFICATION
/102.  Text paged Dr. Russell, prn med?  Also can we have tylenol prn for back pain?      PRN Hydralazine and tylenol ordered.

## 2020-03-21 NOTE — CONSULTS
Consult Date:  03/21/2020      REQUESTING PHYSICIAN:  Dr. Armando Enrique.      REASON FOR CONSULTATION:  Suspected metastatic prostate cancer.      HISTORY:  Medical record reviewed, history obtained and patient examined.      Mr. Andre Serrano is a 64-year-old pleasant man with a history of elevated PSA in 09/2019.  He had multiple urinary symptoms including hesitancy, frequency and decrease in urine flow for a while, but worsened significantly in the last few days.  He was supposed to have a followup September, but lost to followup and he came to the outpatient office with increasing abdominal pain and left flank pain.  He had constipation a few days, but he was not eating much and was found to have creatinine of 9 and was admitted to the hospital on 03/20/2020.  A Pride catheter was placed and he is feeling much better at this point and has hematuria in the Pride catheter bag.  CT scan of abdomen without contrast was done and it showed moderate bilateral hydroureteronephrosis with the hydroureter extending to the distal aspect without evidence of urolithiasis.  There is moderate asymmetric left periureteral and perinephric stranding.  Mild nodular enlargement of the prostate and dense in the base of the urinary bladder with regular layering hyperdensity, inseparable from the nodular protrusion of the prostate.  There is lymphadenopathy, diffuse retroperitoneal and bilateral iliac chain, left periaortic lymph nodes at the level of the left kidney 2.1 x 2.8 cm.  Left external iliac lymph node 1.9 x 3.1 cm.  Multiple lymph nodes adjacent to the inferior mesenteric vasculature measuring 1.4 x 1.6 cm.  Numerous bilateral internal iliac lymphadenopathy.  Multiple faint sclerotic lesions in the visualized spine and pelvis, most prominent in the subtrochanteric right proximal femur, intertrochanteric left proximal femur consistent with metastatic disease.      PAST MEDICAL HISTORY:  Obstructive sleep apnea, obesity,  elevated PSA, alcohol abuse, appendectomy and inguinal hernia surgery.      MEDICATIONS PRIOR TO ADMISSION:  Lipitor and Flomax.      ALLERGIES:  NONE.      SOCIAL HISTORY:  He is a retired  for [employer], retired about 6 years ago, lives in an apartment by himself.  Nonsmoker.  He drinks 12-20 alcoholic beverages per week for many years.      FAMILY HISTORY:  His father had skin melanoma with metastatic disease to the brain, the diagnosis in his 60s and  in his 70s.  Mother had uterine cancer in her 60s.  No other malignancy or hematological disorders.      REVIEW OF SYSTEMS:  Significant for mild anorexia in the last few weeks, decreased weight 10 pounds, no fever, no respiratory symptoms.  Abdominal pain, constipation, nausea and distention, as detailed in the HPI in the left flank.  Urinary symptoms as detailed.  No hematuria until he had a Pride catheter in.  The remainder of system review is unremarkable.      PHYSICAL EXAMINATION:   GENERAL:  He is in no acute distress.   VITAL SIGNS:  Stable as charted, afebrile.   HEENT:  Normocephalic, atraumatic.  Sclerae are anicteric.   NECK:  Supple, no JVD, no lymphadenopathy.   LUNGS:  Clear to auscultation.   HEART:  Regular rhythm.   ABDOMEN:  Soft, nontender, no organomegaly.   EXTREMITIES:  No cyanosis or edema.   LYMPHATIC:  No lymphadenopathy in cervical, supraclavicular, axillary, epitrochlear or inguinal areas.   SKIN:  Limited skin examination, no petechia or ecchymosis, no rash.  Rectal examination done by Urology earlier.  Normal sphincter tone, no mass.  The prostate was hard and irregular, particularly at the right base and mid gland, clinically stage T3.   NEUROLOGIC:  Nonfocal.      ANCILLARY DATA:  Creatinine on admission was 9.29, today is down to 3, calcium 9.1, protein and albumin normal.  Liver enzymes including alkaline phosphatase normal.  White count is 14.7, hemoglobin 14.4, platelets 292.  CT scan as detailed.  I  did review the images independently.  Chest x-ray:  No acute abnormality.  PSA from 2019 was 147 and previously from 2015 was 9.8.      IMPRESSION:   1.  Enlarged prostate with lymphadenopathy as detailed and bone lesions and high PSA all are consistent with metastatic prostate cancer, stage IV.   2.  Acute renal failure due to obstruction, improving with Pride catheter in place.   3.  Leukocytosis associated with malignancy.   4.  Weight loss associated with suspected malignancy.      DISCUSSION AND RECOMMENDATIONS:  I reviewed with Mr. Santos the clinical picture with Radiology and lab abnormalities that this is highly consistent with metastatic prostate cancer and treatment recommendations to be mostly systemic.  Diagnosis needs to be confirmed; therefore, biopsy of the prostate or accessible lymph nodes in the pelvis will be recommended, possibly for Monday.  He has no disease in the chest and additional imaging, preferably a bone scan, will identify the extent of bone lesions.  If the biopsy is consistent with adenocarcinoma of the prostate, he will be started on androgen blockade therapy with few options including Lupron and abiraterone or other combinations.  I will obtain a new PSA baseline and will order a bone scan while he is in the hospital.      I appreciate the opportunity to participate in the care of Andre Santos.  We will follow while hospitalized.         STEVE SOTO MD             D: 2020   T: 2020   MT: CHANCE      Name:     ANDRE SANTOS   MRN:      [id number]        Account:       [id number]   :      1955           Consult Date:  2020      Document: [id number]       cc: Armando Enrique MD

## 2020-03-21 NOTE — PLAN OF CARE
DATE & TIME: 3/20/20 8871-1515   Cognitive Concerns/ Orientation : A&Ox4  BEHAVIOR & AGGRESSION TOOL COLOR: Green  CIWA SCORE: N/A   ABNL VS/O2: VSS on RA, elevated /96  MOBILITY: SBA  PAIN MANAGMENT: Denies  DIET: Reg  BOWEL/BLADDER: Continent, Pride with red/pink output MD aware.  ABNL LAB/BG: WBC 14.6 Creat 9.29 Urea 78  DRAIN/DEVICES: PIV infusing at 100ml/hr. Pride in place  TELEMETRY RHYTHM: N/A  SKIN: Scabbed   TESTS/PROCEDURES: Chest Xray this evening  D/C DAY/GOALS/PLACE: Pending  OTHER IMPORTANT INFO: Urology consulted. Observe for withdrawal- defer CIWA protocol for now per MD.

## 2020-03-22 LAB
ANION GAP SERPL CALCULATED.3IONS-SCNC: 6 MMOL/L (ref 3–14)
BACTERIA SPEC CULT: NO GROWTH
BUN SERPL-MCNC: 28 MG/DL (ref 7–30)
CALCIUM SERPL-MCNC: 8.6 MG/DL (ref 8.5–10.1)
CHLORIDE SERPL-SCNC: 108 MMOL/L (ref 94–109)
CO2 SERPL-SCNC: 21 MMOL/L (ref 20–32)
CREAT SERPL-MCNC: 1.78 MG/DL (ref 0.66–1.25)
GFR SERPL CREATININE-BSD FRML MDRD: 39 ML/MIN/{1.73_M2}
GLUCOSE SERPL-MCNC: 108 MG/DL (ref 70–99)
LACTATE BLD-SCNC: 0.6 MMOL/L (ref 0.7–2)
Lab: NORMAL
POTASSIUM SERPL-SCNC: 4.5 MMOL/L (ref 3.4–5.3)
SODIUM SERPL-SCNC: 135 MMOL/L (ref 133–144)
SPECIMEN SOURCE: NORMAL

## 2020-03-22 PROCEDURE — 25800030 ZZH RX IP 258 OP 636: Performed by: HOSPITALIST

## 2020-03-22 PROCEDURE — 99232 SBSQ HOSP IP/OBS MODERATE 35: CPT | Performed by: HOSPITALIST

## 2020-03-22 PROCEDURE — 12000000 ZZH R&B MED SURG/OB

## 2020-03-22 PROCEDURE — 25800029 ZZH RX IP 258 OP 250: Performed by: HOSPITALIST

## 2020-03-22 PROCEDURE — 80048 BASIC METABOLIC PNL TOTAL CA: CPT | Performed by: HOSPITALIST

## 2020-03-22 PROCEDURE — 25000128 H RX IP 250 OP 636: Performed by: HOSPITALIST

## 2020-03-22 PROCEDURE — 36415 COLL VENOUS BLD VENIPUNCTURE: CPT | Performed by: HOSPITALIST

## 2020-03-22 PROCEDURE — 25000132 ZZH RX MED GY IP 250 OP 250 PS 637: Performed by: HOSPITALIST

## 2020-03-22 PROCEDURE — 83605 ASSAY OF LACTIC ACID: CPT | Performed by: HOSPITALIST

## 2020-03-22 RX ORDER — SODIUM CHLORIDE 450 MG/100ML
INJECTION, SOLUTION INTRAVENOUS CONTINUOUS
Status: DISCONTINUED | OUTPATIENT
Start: 2020-03-22 | End: 2020-03-23 | Stop reason: HOSPADM

## 2020-03-22 RX ORDER — HYDROCODONE BITARTRATE AND ACETAMINOPHEN 5; 325 MG/1; MG/1
1 TABLET ORAL ONCE
Status: DISCONTINUED | OUTPATIENT
Start: 2020-03-22 | End: 2020-03-23 | Stop reason: HOSPADM

## 2020-03-22 RX ADMIN — SODIUM CHLORIDE: 4.5 INJECTION, SOLUTION INTRAVENOUS at 12:51

## 2020-03-22 RX ADMIN — SODIUM CHLORIDE, PRESERVATIVE FREE: 5 INJECTION INTRAVENOUS at 03:11

## 2020-03-22 RX ADMIN — CEFTRIAXONE SODIUM 1 G: 1 INJECTION, POWDER, FOR SOLUTION INTRAMUSCULAR; INTRAVENOUS at 18:13

## 2020-03-22 RX ADMIN — ACETAMINOPHEN 325 MG: 325 TABLET, FILM COATED ORAL at 06:03

## 2020-03-22 RX ADMIN — SODIUM CHLORIDE: 4.5 INJECTION, SOLUTION INTRAVENOUS at 23:24

## 2020-03-22 RX ADMIN — ACETAMINOPHEN 325 MG: 325 TABLET, FILM COATED ORAL at 00:58

## 2020-03-22 RX ADMIN — ACETAMINOPHEN 325 MG: 325 TABLET, FILM COATED ORAL at 15:08

## 2020-03-22 RX ADMIN — SENNOSIDES AND DOCUSATE SODIUM 2 TABLET: 8.6; 5 TABLET ORAL at 20:10

## 2020-03-22 RX ADMIN — BISACODYL 10 MG: 10 SUPPOSITORY RECTAL at 10:24

## 2020-03-22 RX ADMIN — ACETAMINOPHEN 325 MG: 325 TABLET, FILM COATED ORAL at 20:09

## 2020-03-22 RX ADMIN — ACETAMINOPHEN 325 MG: 325 TABLET, FILM COATED ORAL at 10:24

## 2020-03-22 RX ADMIN — SENNOSIDES AND DOCUSATE SODIUM 2 TABLET: 8.6; 5 TABLET ORAL at 08:22

## 2020-03-22 ASSESSMENT — ACTIVITIES OF DAILY LIVING (ADL)
ADLS_ACUITY_SCORE: 11

## 2020-03-22 NOTE — PROGRESS NOTES
SANDRA  [facility]  Hematology/oncology Progress Note            History:   Mr. Andre Serrano is a 64-year-old pleasant man with a history of elevated PSA in 09/2019.  He had multiple urinary symptoms including hesitancy, frequency and decrease in urine flow for a while, but worsened significantly in the last few days.  He was supposed to have a followup September, but lost to followup and he came to the outpatient office with increasing abdominal pain and left flank pain.  He had constipation a few days, but he was not eating much and was found to have creatinine of 9 and was admitted to the hospital on 03/20/2020.  A Pride catheter was placed and he is feeling much better at this point and has hematuria in the Pride catheter bag.  CT scan of abdomen without contrast was done and it showed moderate bilateral hydroureteronephrosis with the hydroureter extending to the distal aspect without evidence of urolithiasis.  There is moderate asymmetric left periureteral and perinephric stranding.  Mild nodular enlargement of the prostate and dense in the base of the urinary bladder with regular layering hyperdensity, inseparable from the nodular protrusion of the prostate.  There is lymphadenopathy, diffuse retroperitoneal and bilateral iliac chain, left periaortic lymph nodes at the level of the left kidney 2.1 x 2.8 cm.  Left external iliac lymph node 1.9 x 3.1 cm.  Multiple lymph nodes adjacent to the inferior mesenteric vasculature measuring 1.4 x 1.6 cm.  Numerous bilateral internal iliac lymphadenopathy.  Multiple faint sclerotic lesions in the visualized spine and pelvis, most prominent in the subtrochanteric right proximal femur, intertrochanteric left proximal femur consistent with metastatic disease.      Had spasm in back. Pride will be kept for a while.  Continues on IVF and ceftriaxone         Medications:       cefTRIAXone  1 g Intravenous Q24H     HYDROcodone-acetaminophen  1 tablet Oral Once      senna-docusate  1 tablet Oral BID    Or     senna-docusate  2 tablet Oral BID     sodium chloride (PF)  3 mL Intracatheter Q8H                ROS:   RESP, CV, GI,, MUSCULOSKELETAL, HEME/ALLERGY/IMMUNE,  Skin: reviewed and negative except the positives mentioned in this note            Physical Exam:       Vital Sign Ranges  Temp:  [97.8  F (36.6  C)-99.9  F (37.7  C)] 97.8  F (36.6  C)  Heart Rate:  [] 102  Resp:  [16] 16  BP: (142-158)/() 142/92  SpO2:  [94 %-97 %] 94 %      I/O last 3 completed shifts:  In: 1998 [P.O.:1320; I.V.:678]  Out: 4750 [Urine:4750]    Constitutional: Awake, alert, cooperative, no apparent distress  HEENT: unremarkable  Ext: no edema, cyanosis      Pride with sanguinous urine       Data:       ROUTINE LABS (Last four results)  CMP  Recent Labs   Lab 03/22/20  0747 03/21/20  0945 03/20/20  1634 03/20/20  1508    135 133 135   POTASSIUM 4.5 5.0 4.7 5.0   CHLORIDE 108 105 100 97   CO2 21 23 23  --    ANIONGAP 6 7 10  --    * 168* 112* 113*   BUN 28 43* 78* 68*  9*   CR 1.78* 3.09* 9.29* 7.79*   GFRESTIMATED 39* 20* 5*  --    GFRESTBLACK 45* 23* 6*  --    JAXSON 8.6 9.1 8.9 9.0   PROTTOTAL  --   --  7.1 6.4   ALBUMIN  --   --  3.4 4.2   BILITOTAL  --   --  0.7 0.4   ALKPHOS  --   --  83 83   AST  --   --  8 9   ALT  --   --  14 8     CBC  Recent Labs   Lab 03/21/20  0945 03/20/20  1455   WBC 14.7* 14.6*   RBC 4.26* 4.32   HGB 14.4 14.0   HCT 40.8 40.9   MCV 96 94.5   MCH 33.8* 32.3*   MCHC 35.3 34.2   RDW 12.1  --     306              Assessment and Plan:   IMPRESSION:   1.  Enlarged prostate with lymphadenopathy as detailed and bone lesions and high PSA all are consistent with metastatic prostate cancer, stage IV.   2.  Acute renal failure due to obstruction, improving with Pride catheter in place cr 1.78.   3.  Leukocytosis associated with malignancy.   4.  Weight loss associated with suspected malignancy.   5. Hematuria, no drop in hgb.     PLAN:    Diagnosis needs to be confirmed; therefore, biopsy of the prostate or accessible lymph nodes in the pelvis is recommended, discussed with Dr. Enrique who saw pt and recommended prostate biopsy as outpatient, will cancel inpatient bx by radiology.  Bone scan ordered for 3/23.  If the biopsy is consistent with adenocarcinoma of the prostate, he will be started on androgen blockade therapy with few options including Lupron and abiraterone or other combinations.   Will plan f/u after tissue diagnosis        Jose Nguyen M.D.

## 2020-03-22 NOTE — PLAN OF CARE
DATE & TIME: 3/21/20, 5551-4414  Cognitive Concerns/ Orientation : A&Ox4   BEHAVIOR & AGGRESSION TOOL COLOR: Green  CIWA SCORE: N/a    ABNL VS/O2: VSS on RA  MOBILITY: SBA  PAIN MANAGMENT: Tylenol given x2 for flank pain with relief.  DIET: Regular; Fair intake  BOWEL/BLADDER: Pride in place; urine is red with clots  ABNL LAB/BG: creat improved to 3.09, wbc 14.7  DRAIN/DEVICES: PIV infusing at 100 ml/hr.   TELEMETRY RHYTHM: N/a  SKIN: Scabbed  TESTS/PROCEDURES: CT guided biopsy and bone scan ordered for 3/23  D/C DAY/GOALS/PLACE: Pending  OTHER IMPORTANT INFO: Urology consulted; informed patient of diagnosis, patient accepting; Oncology consulted. IV antibiotics

## 2020-03-22 NOTE — PROGRESS NOTES
[facility]    Medicine Progress Note - Hospitalist Service       Date of Admission:  3/20/2020  Assessment & Plan      Moderate bilateral hydroureteronephrosis   Acute kidney injury, obstructive from most likely metastatic prostate cancer  Findings consistent with obstruction due to prostate cancer. A Cooepr catheter has been placed with subsequently good urine output.  He is receiving ceftriaxone for a possible UTI.   Seen by urology and oncology    Continue Cooper catheter     Continue intravenous ceftriaxone as we follow cultures    Intravenous fluid will continue for now    Repeat labs tomorrow    Consulted the urologist and hematology    Possible bone scan Monday with plan for outpatient prostate biopsy    Repeat bmp and cbc tomorrow     Alcohol use disorder    Observe for withdrawal- defer CIWA protocol for now     History of hypercholesterolemia   He had been on a statin but stopped taking it.    Follow up with primary care provider     Diet: Combination Diet Regular Diet Adult    DVT Prophylaxis: Pneumatic Compression Devices  Cooper Catheter: in place, indication: Obstruction  Code Status: Full Code      Disposition Plan   Expected discharge: Tomorrow, recommended to prior living arrangement once bone scan completed and oncology okays. Will need HHRN for the cooper catheter  Entered: Luis Carlos Russell DO 03/22/2020, 11:08 AM       The patient's care was discussed with the Patient.    Luis Carlos Russell DO  Hospitalist Service  [facility]    ______________________________________________________________________    Interval History   No acute events, feeling much better. Passed some minor stool, planning on suppository today    Data reviewed today: I reviewed all medications, new labs and imaging results over the last 24 hours. I personally reviewed no images or EKG's today.    Physical Exam   Vital Signs: Temp: 97.8  F (36.6  C) Temp src: Oral BP: (!) 142/92   Heart Rate:  102 Resp: 16 SpO2: 94 % O2 Device: None (Room air)    Weight: 222 lbs 11.2 oz  Constitutional: awake, alert, cooperative, no apparent distress  Respiratory: No increased work of breathing, good air exchange, clear to auscultation bilaterally, no crackles or wheezing  Cardiovascular: regular rate and rhythm, normal S1 and S2, no S3 or S4, and no murmur noted  GI: normal bowel sounds, soft, non-distended, non-tender, no masses palpated  Genitounirinary:  +Pride   Skin: no rashes and no jaundice    Data   Recent Labs   Lab 03/22/20  0747 03/21/20  0945 03/20/20  1634 03/20/20  1508  03/20/20  1455   WBC  --  14.7*  --   --   --  14.6*   HGB  --  14.4  --   --   --  14.0   MCV  --  96  --   --   --  94.5   PLT  --  292  --   --   --  306    135 133 135   < >  --    POTASSIUM 4.5 5.0 4.7 5.0   < >  --    CHLORIDE 108 105 100 97   < >  --    CO2 21 23 23  --   --   --    BUN 28 43* 78* 68*  9*   < >  --    CR 1.78* 3.09* 9.29* 7.79*   < >  --    ANIONGAP 6 7 10  --   --   --    JAXSON 8.6 9.1 8.9 9.0   < >  --    * 168* 112* 113*   < >  --    ALBUMIN  --   --  3.4 4.2  --   --    PROTTOTAL  --   --  7.1 6.4  --   --    BILITOTAL  --   --  0.7 0.4  --   --    ALKPHOS  --   --  83 83  --   --    ALT  --   --  14 8  --   --    AST  --   --  8 9  --   --    LIPASE  --   --  198  --   --   --     < > = values in this interval not displayed.     No results found for this or any previous visit (from the past 24 hour(s)).  Medications     sodium chloride 100 mL/hr at 03/22/20 0311       cefTRIAXone  1 g Intravenous Q24H     senna-docusate  1 tablet Oral BID    Or     senna-docusate  2 tablet Oral BID     sodium chloride (PF)  3 mL Intracatheter Q8H

## 2020-03-22 NOTE — PLAN OF CARE
Cognitive Concerns/ Orientation : A&Ox4   BEHAVIOR & AGGRESSION TOOL COLOR: Green  CIWA SCORE: N/a    ABNL VS/O2: VSS on RA  MOBILITY: SBA  PAIN MANAGMENT: Tylenol given x1 for flank pain with relief.  DIET: Regular; Fair intake  BOWEL/BLADDER: Pride in place; urine is red.  Good UOP.  ABNL LAB/BG: creat improved to 1.78  DRAIN/DEVICES: PIV infusing at 100 ml/hr.   TELEMETRY RHYTHM: N/a  SKIN: Scabbed  TESTS/PROCEDURES: CT guided biopsy and bone scan ordered for 3/23.  D/C DAY/GOALS/PLACE: Pending  OTHER IMPORTANT INFO: Urology consulted; informed patient of diagnosis, patient accepting; Oncology consulted.

## 2020-03-22 NOTE — PLAN OF CARE
DATE & TIME: 3/22/20 0455-5492    Cognitive Concerns/ Orientation : A&Ox4   BEHAVIOR & AGGRESSION TOOL COLOR: Green  CIWA SCORE: N/a   ABNL VS/O2: VSS, ex BP slightly elevated, on room air  MOBILITY: SBA  PAIN MANAGMENT: PRN Tylenol given x2  DIET: Regular  BOWEL/BLADDER: Pride in place, urine pink/red. Complaint of constipation, no BM this shift.   ABNL LAB/BG: N/a  DRAIN/DEVICES: PIV infusing  TELEMETRY RHYTHM: N/a  SKIN: WDL  TESTS/PROCEDURES: Bone scan on Monday   D/C DAY/GOALS/PLACE: TBD  OTHER IMPORTANT INFO:

## 2020-03-23 ENCOUNTER — APPOINTMENT (OUTPATIENT)
Dept: NUCLEAR MEDICINE | Facility: CLINIC | Age: 65
DRG: 715 | End: 2020-03-23
Attending: INTERNAL MEDICINE
Payer: COMMERCIAL

## 2020-03-23 ENCOUNTER — APPOINTMENT (OUTPATIENT)
Dept: CT IMAGING | Facility: CLINIC | Age: 65
DRG: 715 | End: 2020-03-23
Attending: INTERNAL MEDICINE
Payer: COMMERCIAL

## 2020-03-23 VITALS
TEMPERATURE: 99.3 F | RESPIRATION RATE: 16 BRPM | HEART RATE: 70 BPM | DIASTOLIC BLOOD PRESSURE: 68 MMHG | WEIGHT: 220.4 LBS | OXYGEN SATURATION: 98 % | HEIGHT: 68 IN | SYSTOLIC BLOOD PRESSURE: 127 MMHG | BODY MASS INDEX: 33.4 KG/M2

## 2020-03-23 LAB
ANION GAP SERPL CALCULATED.3IONS-SCNC: 7 MMOL/L (ref 3–14)
BASOPHILS # BLD AUTO: 0 10E9/L (ref 0–0.2)
BASOPHILS NFR BLD AUTO: 0.2 %
BUN SERPL-MCNC: 21 MG/DL (ref 7–30)
CALCIUM SERPL-MCNC: 9 MG/DL (ref 8.5–10.1)
CHLORIDE SERPL-SCNC: 105 MMOL/L (ref 94–109)
CO2 SERPL-SCNC: 22 MMOL/L (ref 20–32)
CREAT SERPL-MCNC: 1.6 MG/DL (ref 0.66–1.25)
DIFFERENTIAL METHOD BLD: ABNORMAL
EOSINOPHIL # BLD AUTO: 0.2 10E9/L (ref 0–0.7)
EOSINOPHIL NFR BLD AUTO: 1.4 %
ERYTHROCYTE [DISTWIDTH] IN BLOOD BY AUTOMATED COUNT: 12.2 % (ref 10–15)
GFR SERPL CREATININE-BSD FRML MDRD: 44 ML/MIN/{1.73_M2}
GLUCOSE SERPL-MCNC: 107 MG/DL (ref 70–99)
HCT VFR BLD AUTO: 40.3 % (ref 40–53)
HGB BLD-MCNC: 13.7 G/DL (ref 13.3–17.7)
IMM GRANULOCYTES # BLD: 0.1 10E9/L (ref 0–0.4)
IMM GRANULOCYTES NFR BLD: 0.5 %
INR PPP: 1.13 (ref 0.86–1.14)
LYMPHOCYTES # BLD AUTO: 1.3 10E9/L (ref 0.8–5.3)
LYMPHOCYTES NFR BLD AUTO: 8.7 %
MCH RBC QN AUTO: 32.7 PG (ref 26.5–33)
MCHC RBC AUTO-ENTMCNC: 34 G/DL (ref 31.5–36.5)
MCV RBC AUTO: 96 FL (ref 78–100)
MONOCYTES # BLD AUTO: 1.6 10E9/L (ref 0–1.3)
MONOCYTES NFR BLD AUTO: 10.1 %
NEUTROPHILS # BLD AUTO: 12.1 10E9/L (ref 1.6–8.3)
NEUTROPHILS NFR BLD AUTO: 79.1 %
NRBC # BLD AUTO: 0 10*3/UL
NRBC BLD AUTO-RTO: 0 /100
PLATELET # BLD AUTO: 327 10E9/L (ref 150–450)
POTASSIUM SERPL-SCNC: 4.4 MMOL/L (ref 3.4–5.3)
RBC # BLD AUTO: 4.19 10E12/L (ref 4.4–5.9)
SODIUM SERPL-SCNC: 134 MMOL/L (ref 133–144)
WBC # BLD AUTO: 15.3 10E9/L (ref 4–11)

## 2020-03-23 PROCEDURE — A9503 TC99M MEDRONATE: HCPCS | Performed by: HOSPITALIST

## 2020-03-23 PROCEDURE — 88305 TISSUE EXAM BY PATHOLOGIST: CPT | Mod: 26 | Performed by: HOSPITALIST

## 2020-03-23 PROCEDURE — 99239 HOSP IP/OBS DSCHRG MGMT >30: CPT | Performed by: HOSPITALIST

## 2020-03-23 PROCEDURE — 78306 BONE IMAGING WHOLE BODY: CPT

## 2020-03-23 PROCEDURE — 36415 COLL VENOUS BLD VENIPUNCTURE: CPT | Performed by: PHYSICIAN ASSISTANT

## 2020-03-23 PROCEDURE — 88341 IMHCHEM/IMCYTCHM EA ADD ANTB: CPT | Mod: 26 | Performed by: HOSPITALIST

## 2020-03-23 PROCEDURE — 88341 IMHCHEM/IMCYTCHM EA ADD ANTB: CPT | Performed by: HOSPITALIST

## 2020-03-23 PROCEDURE — 88342 IMHCHEM/IMCYTCHM 1ST ANTB: CPT | Performed by: HOSPITALIST

## 2020-03-23 PROCEDURE — 25000132 ZZH RX MED GY IP 250 OP 250 PS 637: Performed by: HOSPITALIST

## 2020-03-23 PROCEDURE — 40000863 ZZH STATISTIC RADIOLOGY XRAY, US, CT, MAR, NM

## 2020-03-23 PROCEDURE — 25000125 ZZHC RX 250: Performed by: PHYSICIAN ASSISTANT

## 2020-03-23 PROCEDURE — 88173 CYTOPATH EVAL FNA REPORT: CPT | Mod: 26 | Performed by: HOSPITALIST

## 2020-03-23 PROCEDURE — 85025 COMPLETE CBC W/AUTO DIFF WBC: CPT | Performed by: HOSPITALIST

## 2020-03-23 PROCEDURE — 25800029 ZZH RX IP 258 OP 250: Performed by: HOSPITALIST

## 2020-03-23 PROCEDURE — 36415 COLL VENOUS BLD VENIPUNCTURE: CPT | Performed by: HOSPITALIST

## 2020-03-23 PROCEDURE — 99153 MOD SED SAME PHYS/QHP EA: CPT

## 2020-03-23 PROCEDURE — 88342 IMHCHEM/IMCYTCHM 1ST ANTB: CPT | Mod: 26 | Performed by: HOSPITALIST

## 2020-03-23 PROCEDURE — 85610 PROTHROMBIN TIME: CPT | Performed by: PHYSICIAN ASSISTANT

## 2020-03-23 PROCEDURE — 80048 BASIC METABOLIC PNL TOTAL CA: CPT | Performed by: HOSPITALIST

## 2020-03-23 PROCEDURE — 88305 TISSUE EXAM BY PATHOLOGIST: CPT | Performed by: HOSPITALIST

## 2020-03-23 PROCEDURE — 34300033 ZZH RX 343: Performed by: HOSPITALIST

## 2020-03-23 PROCEDURE — 25000128 H RX IP 250 OP 636: Performed by: PHYSICIAN ASSISTANT

## 2020-03-23 PROCEDURE — 88173 CYTOPATH EVAL FNA REPORT: CPT | Performed by: HOSPITALIST

## 2020-03-23 PROCEDURE — 49180 BIOPSY ABDOMINAL MASS: CPT

## 2020-03-23 PROCEDURE — 07BC3ZX EXCISION OF PELVIS LYMPHATIC, PERCUTANEOUS APPROACH, DIAGNOSTIC: ICD-10-PCS | Performed by: RADIOLOGY

## 2020-03-23 RX ORDER — FENTANYL CITRATE 50 UG/ML
25-50 INJECTION, SOLUTION INTRAMUSCULAR; INTRAVENOUS EVERY 5 MIN PRN
Status: DISCONTINUED | OUTPATIENT
Start: 2020-03-23 | End: 2020-03-23

## 2020-03-23 RX ORDER — NALOXONE HYDROCHLORIDE 0.4 MG/ML
.1-.4 INJECTION, SOLUTION INTRAMUSCULAR; INTRAVENOUS; SUBCUTANEOUS
Status: DISCONTINUED | OUTPATIENT
Start: 2020-03-23 | End: 2020-03-23

## 2020-03-23 RX ORDER — FLUMAZENIL 0.1 MG/ML
0.2 INJECTION, SOLUTION INTRAVENOUS
Status: DISCONTINUED | OUTPATIENT
Start: 2020-03-23 | End: 2020-03-23

## 2020-03-23 RX ORDER — TC 99M MEDRONATE 20 MG/10ML
25 INJECTION, POWDER, LYOPHILIZED, FOR SOLUTION INTRAVENOUS ONCE
Status: COMPLETED | OUTPATIENT
Start: 2020-03-23 | End: 2020-03-23

## 2020-03-23 RX ORDER — LIDOCAINE 40 MG/G
CREAM TOPICAL
Status: DISCONTINUED | OUTPATIENT
Start: 2020-03-23 | End: 2020-03-23

## 2020-03-23 RX ORDER — AMOXICILLIN 250 MG
2 CAPSULE ORAL 2 TIMES DAILY
Qty: 60 TABLET | Refills: 1 | Status: ON HOLD | OUTPATIENT
Start: 2020-03-23 | End: 2020-05-08

## 2020-03-23 RX ADMIN — TC 99M MEDRONATE 26.8 MCI.: 20 INJECTION, POWDER, LYOPHILIZED, FOR SOLUTION INTRAVENOUS at 07:10

## 2020-03-23 RX ADMIN — MIDAZOLAM HYDROCHLORIDE 1 MG: 1 INJECTION, SOLUTION INTRAMUSCULAR; INTRAVENOUS at 10:12

## 2020-03-23 RX ADMIN — LIDOCAINE HYDROCHLORIDE 18 ML: 10 INJECTION, SOLUTION EPIDURAL; INFILTRATION; INTRACAUDAL; PERINEURAL at 10:25

## 2020-03-23 RX ADMIN — SODIUM CHLORIDE: 4.5 INJECTION, SOLUTION INTRAVENOUS at 08:18

## 2020-03-23 RX ADMIN — ACETAMINOPHEN 325 MG: 325 TABLET, FILM COATED ORAL at 04:37

## 2020-03-23 RX ADMIN — FENTANYL CITRATE 50 MCG: 0.05 INJECTION, SOLUTION INTRAMUSCULAR; INTRAVENOUS at 10:12

## 2020-03-23 RX ADMIN — ACETAMINOPHEN 325 MG: 325 TABLET, FILM COATED ORAL at 12:35

## 2020-03-23 RX ADMIN — SENNOSIDES AND DOCUSATE SODIUM 2 TABLET: 8.6; 5 TABLET ORAL at 08:14

## 2020-03-23 ASSESSMENT — ACTIVITIES OF DAILY LIVING (ADL)
ADLS_ACUITY_SCORE: 11

## 2020-03-23 ASSESSMENT — MIFFLIN-ST. JEOR: SCORE: 1759.23

## 2020-03-23 NOTE — PROGRESS NOTES
Sedation Post Procedure Summary:    Immediately prior to starting the procedure a Time Out was conducted with procedural staff and re-confirmed the patient s name, procedure, and site/side. Md completed sedation assessment.     Consent obtained from patient after discussing the risks, benefits and alternatives.       Indication:  Sedation was required to allow for Left ileac lymph node biopsy with consious sedation    Sedatives: Fentanyl 50 Mcg and Versed 1 Mg    Vital signs, airway, and pulse oximetry were monitored throughout the procedure and sedation was maintained until the procedure was complete.      Patient tolerance: Patient tolerated the procedure well with no immediate complications. Site dressed with band. No bleeding or hematoma at completion. (Left ileac)    Post-procedure report given to:Primary RN and pt transferred to floor by cart.    (See Doc Flow-sheets and MAR for additional information)    Obinna Marquez RN

## 2020-03-23 NOTE — PROGRESS NOTES
Chart Check Heme/Onc:    Notes reviewed. PSA is > 700. Bone scan pending this morning. Dr. Enrique planning outpatient prostate biopsy in the clinic. It would be okay for Negrito to discharge from an Oncology standpoint. I will have the [facility] staff contact him for follow up with Dr. Nguyen next week to review lab, imaging and discuss initiation of treatment with abiraterone.     Discussed with Shine TSE.    Phil AZAR, CNP  [facility]  [phone] (office) or [phone] (cell)

## 2020-03-23 NOTE — CONSULTS
Consult Date:  03/22/2020      CHIEF COMPLAINT:   1.  Urinary retention.   2.  Elevated PSA.   3.  Suspected metastatic prostate cancer.      HISTORY OF PRESENT ILLNESS:  The patient is a 64-year-old male who was seen in the urgent care with lower abdominal pain and found to be in urinary retention.  The patient also was noted to have an elevated PSA of 147.  The patient did undergo a CT scan which demonstrated a nodular prostate, bilateral hydronephrosis and hydroureter down to the level of the bladder and significant pelvic lymphadenopathy with several suspicious bone lesions.  Findings consistent with most likely metastatic prostate cancer.  With Pride catheter placement he is feeling much better.  His creatinine was elevated at greater than 9.  He did have some mild hematuria secondary to bladder decompression.  Clinically, the patient has denied any bone pain, has had minimal weight loss and overall has been in relatively good health.  He a history of a negative biopsy 5-8 years ago.  Review of the chart demonstrates that he had an elevated PSA of 9 in 2015.  He basically was lost to followup.      REVIEW OF SYSTEMS:  Negative except those mentioned in the PI.      PAST MEDICAL HISTORY:  Alcohol abuse.      MEDICATIONS:  Ceftriaxone, Norco, Colace.      PHYSICAL EXAMINATION:    VITAL SIGNS:  Temperature of 97.8, heart rate of 102, respirations 16, blood pressure 142/92, and 92% O2 sats.   GENERAL:  The patient is in no apparent distress.  Overall, feels much better since his catheter was placed.   HEENT:  Atraumatic, normocephalic air.   LUNGS:  Good air exchange.   ABDOMEN:  Obese, but depressible.   GENITOURINARY:  External genitalia are normal, previous rectal exam demonstrated a hard prostate.   EXTREMITIES:  Negative.   NEUROLOGIC:  Intact.      LABORATORY DATA:  Sodium 135, potassium 4.5, chloride 108, CO2 of 21, glucose 108, creatinine was 9.29, that has improved to 3.09 and 1.78.  The patient's white  blood cell count was 14.6, hemoglobin 14.4.      IMPRESSION:   1.  Urinary retention secondary to most likely prostate cancer.   2.  Bilateral hydronephrosis.   3.  Significant pelvic lymphadenopathy and suspicious bone lesions for metastatic prostate cancer.    4.  Hematuria secondary to bladder decompression.      RECOMMENDATIONS:  Stabilize the patient and to Oncology consultation.  He will be scheduled for a bone scan and later when he is stable, will be discharged and will undergo an outpatient biopsy in the clinic.  Later he will be started on abiraterone and Eligard.         IRMA ENRIQUE MD             D: 2020   T: 2020   MT: YANET      Name:     KAROL SANTOS   MRN:      [id number]        Account:       [id number]   :      1955           Consult Date:  2020      Document: [id number]       cc: Chacho Enrique MD

## 2020-03-23 NOTE — PLAN OF CARE
Cognitive Concerns/ Orientation: A&Ox4   BEHAVIOR & AGGRESSION TOOL COLOR: Green  CIWA SCORE: N/a    ABNL VS/O2: VSS on RA  MOBILITY: SBA  PAIN MANAGMENT: Tylenol given x2 for flank and back pain, helpful  DIET: Regular  BOWEL/BLADDER: Pride in place, patent; hematuria continues  ABNL LAB/BG: creat improved to 1.78  DRAIN/DEVICES: PIV   TELEMETRY RHYTHM: N/a  SKIN: Scab  TESTS/PROCEDURES: CT guided biopsy and bone scan ordered today  D/C DAY/GOALS/PLACE: Possibly discharge today after bone scan completed and if Oncology okay  OTHER IMPORTANT INFO: Urology Oncology & Hematology following.

## 2020-03-23 NOTE — DISCHARGE SUMMARY
[facility]  Hospitalist Discharge Summary       Date of Admission:  3/20/2020  Date of Discharge:  3/23/2020  Discharging Provider: Luis Carlos Russell, DO      Discharge Diagnoses   Obstructive uropathy secondary to metastatic prostate cancer causing ANITA    Follow-ups Needed After Discharge   Follow-up Appointments     Follow-up and recommended labs and tests       You will have a follow up appointment with Dr. Paris in 1-2 weeks for   Voiding trial. Our office will call you specific day and time.    [facility]  [address] [place] [address]. [id number]  You may call [phone] with any questions or concerns.   Central Appointment #: [phone]         Follow-up and recommended labs and tests       Follow up with primary care provider, Chacho Galan, within 7 days   to evaluate medication change and for hospital follow- up.  The following   labs/tests are recommended: bmp.      Follow-up with [facility] next week    Follow up as scheduled on 3/31 with Urology             Unresulted Labs Ordered in the Past 30 Days of this Admission     Date and Time Order Name Status Description    3/23/2020 1031 Surgical pathology exam In process       These results will be followed up by none    Discharge Disposition   Discharged to home  Condition at discharge: Stable    Hospital Course   Moderate bilateral hydroureteronephrosis   Acute kidney injury, obstructive from most likely metastatic prostate cancer  Findings consistent with obstruction due to prostate cancer. A Pride catheter has been placed with subsequently good urine output.  He is receiving ceftriaxone for a possible UTI.   Seen by urology and oncology    Continue Pride catheter follow-up with urology. Home nursing ordered    Stop abx, culture negative    Encourage    Repeat labs tomorrow    Consulted the urologist and hematology    Follow-up with oncology for results of bone scan and prostate  cancer     Alcohol use disorder    Observe for withdrawal- defer CIWA protocol for now     History of hypercholesterolemia   He had been on a statin but stopped taking it.    Follow up with primary care provider        Consultations This Hospital Stay   UROLOGY IP CONSULT  HEMATOLOGY & ONCOLOGY IP CONSULT  UROLOGY IP CONSULT    Code Status   Full Code    Time Spent on this Encounter   I, Luis Carlos Russell DO, personally saw the patient today and spent greater than 30 minutes discharging this patient.       Luis Carlos Russell DO  [facility]  _____________________________________________________________1_________    Physical Exam   Vital Signs: Temp: 99.3  F (37.4  C) Temp src: Oral BP: 127/68 Pulse: 70 Heart Rate: 76 Resp: 16 SpO2: 98 % O2 Device: None (Room air) Oxygen Delivery: 2 LPM  Weight: 220 lbs 6.4 oz  Constitutional: awake, alert, cooperative, no apparent distress  Respiratory: No increased work of breathing, good air exchange, clear to auscultation bilaterally, no crackles or wheezing  Cardiovascular: regular rate and rhythm, normal S1 and S2, no S3 or S4, and no murmur noted  GI: normal bowel sounds, soft, non-distended, non-tender, no masses palpated  Genitounirinary:  +Pride   Skin: no rashes and no jaundice       Primary Care Physician   Chacho Galan    Discharge Orders      Home care nursing referral      Follow-up and recommended labs and tests     You will have a follow up appointment with Dr. Paris in 1-2 weeks for Voiding trial. Our office will call you specific day and time.    [facility]  [address] [place] [address]. [id number]  You may call [phone] with any questions or concerns.   Central Appointment #: [phone]     Reason for your hospital stay    Obstructive kidney failure, likely from prostate cancer     Follow-up and recommended labs and tests     Follow up with primary care provider, Chacho Galan, within 7  days to evaluate medication change and for hospital follow- up.  The following labs/tests are recommended: bmp.      Follow-up with [facility] next week    Follow up as scheduled on 3/31 with Urology     Activity    Your activity upon discharge: activity as tolerated     Discharge Instructions    Only use tylenol or acetaminophen for over the counter pain medications. Aleve, ibuprofen can cause worsening kidney issues     MD face to face encounter    Documentation of Face to Face and Certification for Home Health Services    I certify that patient: Andre Serrano is under my care and that I, or a nurse practitioner or physician's assistant working with me, had a face-to-face encounter that meets the physician face-to-face encounter requirements with this patient on: 3/23/2020.    This encounter with the patient was in whole, or in part, for the following medical condition, which is the primary reason for home health care: obstructive kidney failure.    I certify that, based on my findings, the following services are medically necessary home health services: Nursing.    My clinical findings support the need for the above services because: Nurse is needed: for cooper management.    Further, I certify that my clinical findings support that this patient is homebound (i.e. absences from home require considerable and taxing effort and are for medical reasons or [employer] services or infrequently or of short duration when for other reasons) because: Requires assistance of another person or specialized equipment to access medical services because patient:  Cooper needs..    Based on the above findings. I certify that this patient is confined to the home and needs intermittent skilled nursing care, physical therapy and/or speech therapy.  The patient is under my care, and I have initiated the establishment of the plan of care.  This patient will be followed by a physician who will periodically review the plan of  care.  Physician/Provider to provide follow up care: Chacho Galan    Attending hospital physician (the Medicare certified PECOS provider): Luis Carlos Russell DO  Physician Signature: See electronic signature associated with these discharge orders.  Date: 3/23/2020     Diet    Follow this diet upon discharge: Orders Placed This Encounter      NPO for Medical/Clinical Reasons Except for: Other; Specify: NPO 4 hours prior to procedure       Significant Results and Procedures   Most Recent 3 CBC's:  Recent Labs   Lab Test 03/23/20  0836 03/21/20  0945 03/20/20  1455   WBC 15.3* 14.7* 14.6*   HGB 13.7 14.4 14.0   MCV 96 96 94.5    292 306     Most Recent 3 BMP's:  Recent Labs   Lab Test 03/23/20  0836 03/22/20  0747 03/21/20  0945    135 135   POTASSIUM 4.4 4.5 5.0   CHLORIDE 105 108 105   CO2 22 21 23   BUN 21 28 43*   CR 1.60* 1.78* 3.09*   ANIONGAP 7 6 7   JAXSON 9.0 8.6 9.1   * 108* 168*     Most Recent 2 LFT's:  Recent Labs   Lab Test 03/20/20  1634 03/20/20  1508   AST 8 9   ALT 14 8   ALKPHOS 83 83   BILITOTAL 0.7 0.4   ,   Results for orders placed or performed during the hospital encounter of 03/20/20   CT Abdomen Pelvis w/o Contrast    Narrative    CT ABDOMEN PELVIS W/O CONTRAST 3/20/2020 4:56 PM    CLINICAL HISTORY: left flank pain with nausea and vomiting  TECHNIQUE: CT scan of the abdomen and pelvis was performed without IV  contrast. Multiplanar reformats were obtained. Dose reduction  techniques were used.  CONTRAST: None.    COMPARISON: None.    FINDINGS:   LOWER CHEST: Coronary artery calcifications are noted. There are  micronodules in the visualized lung bases    HEPATOBILIARY: Unenhanced contours unremarkable. Gallbladder  unremarkable.    PANCREAS: Unremarkable.    SPLEEN: Spleen is normal in size.    ADRENAL GLANDS: Unenhanced contours are unremarkable.    KIDNEYS/BLADDER: Moderate bilateral hydroureteronephrosis with a  hydroureter extending to the distal aspects without  evidence of  urolithiasis. There is a moderate asymmetric left periureteral and  perinephric stranding without loculated fluid collection. Moderate  distention of the urinary bladder. There is mild nodular enlargement  of the prostate that indents the base of the urinary bladder with  irregular layering hyperdensity in the dependent urinary bladder that  is inseparable from the nodular protrusion of the prostate as seen on  series 4/image 184.    BOWEL: No bowel obstruction. Mild distal colonic diverticulosis  without acute diverticulitis.    LYMPH NODES: Diffuse retroperitoneal and bilateral iliac chain  lymphadenopathy. Left paraortic lymph node at the level of the left  kidney measures 2.1 x 2.8 cm series 4/image 89, left external iliac  lymph node measures 1.9 x 3.1 cm series 4/image 153. There are  multiple lymph nodes adjacent to the inferior mesenteric vasculature  measuring 1.4 x 1.6 cm series 4/image 110. There are numerous  bilateral internal iliac lymphadenopathy and tubular nodularity in the  mesorectal fascia.    VASCULATURE: Abdominal aorta normal in caliber with mild calcified  atheromatous plaque.    PELVIC ORGANS: Nodular enlargement of the prostate indenting the base  of the urinary bladder.    OTHER: Mild presacral edema. No pneumoperitoneum.    MUSCULOSKELETAL: There are multiple faint sclerotic lesions in the  visualized spine and pelvis, most prominent in the subtrochanteric  right proximal femur, intertrochanteric left proximal femur,  subchondral left femoral head.      Impression    IMPRESSION:   1.  Moderate bilateral hydroureteronephrosis extending to the level of  the distal ureters without focal transition point. There is moderate  urinary bladder retention and the hydronephrosis may be due to urinary  bladder outflow compromise/obstruction from the prostate (high  suspicion for prostate malignancy).  2.  Asymmetric left nephric stranding and edema is concerning for  superimposed  ascending urinary tract infection.  3.  Retroperitoneal and iliac lymphadenopathy consistent with doc  metastatic disease and is favored to be from a primary prostate  malignancy. Additional differential considerations include rectal  malignancy, lymphoma, urothelial malignancy given the irregular  thickening in the posterior urinary bladder wall that is inseparable  from the prostate. Note that multiple lymph nodes are adjacent to the  ureters, however there is no high-grade ureteral caliber change to  suggest that the lymphadenopathy is a source of the obstruction.  4.  Nodularity in the mesial rectal fascia and lymphadenopathy along  the inferior mesenteric vasculature, is concerning for a component of  extramural vascular invasion.  5.  Sclerotic osseous metastatic disease.  6.  A few micronodules in the visualized lung bases, pulmonary  metastatic disease is not excluded.    MALCOLM ALANIS MD   XR Chest Port 1 View    Narrative    EXAM: XR CHEST PORT 1 VW  LOCATION: [facility]  DATE/TIME: 3/20/2020 10:11 PM    INDICATION: Postoperative evaluation.    COMPARISON: None.    FINDINGS: Upright portable chest. The heart size is normal. The lungs are clear. No pneumothorax.      Impression    IMPRESSION: No acute abnormality.   NM Bone Scan Whole Body    Narrative    NUCLEAR MEDICINE WHOLE BODY BONE SCAN March 23, 2020 10:57 AM    HISTORY: Bone lesion, pelvis, incidental on CT, malignancy suspected.    COMPARISON:       Prior bone scan: None.       Other relevant study: A CT of the abdomen and pelvis on  3/20/2020.    TECHNIQUE: Following the uneventful intravenous administration of 26.8  mCi Tc99m MDP IV, routine delayed imaging was performed of the entire  body.    IMAGES OBTAINED: Planar whole body anterior/posterior images and  planar head/neck oblique images.    FINDINGS: There are numerous foci of increased radiotracer uptake  consistent with diffuse osseous metastatic disease. This  includes  involvement of the majority of the spine as well as several bilateral  ribs, both proximal humeri, both proximal femurs, and the distal left  femur. There is also increased uptake within the sacrum bilaterally.  Increased uptake adjacent to the wrists and knees is likely  degenerative in nature. There is also degenerative uptake in both  feet. No other abnormal osseous uptake is demonstrated. There appears  to be bilateral hydronephrosis. No other soft tissue abnormality is  seen.       Impression    IMPRESSION: Diffuse osseous metastatic disease.     SAMIR LEARY MD       Discharge Medications   Discharge Medication List as of 3/23/2020  1:47 PM      START taking these medications    Details   senna-docusate (SENOKOT-S/PERICOLACE) 8.6-50 MG tablet Take 2 tablets by mouth 2 times daily, Disp-60 tablet,R-1, E-Prescribe         STOP taking these medications       Naproxen Sodium (ALEVE PO) Comments:   Reason for Stopping:             Allergies   No Known Allergies

## 2020-03-23 NOTE — DISCHARGE INSTRUCTIONS
If an appointment was recommended for you please call ASAP to schedule.   DUE TO COVID-19 PANDEMIC, MANY CLINICS ARE TEMPORARILY NOT TAKING IN-PERSON APPOINTMENTS. The clinic may schedule you for a phone visit--please answer your phone. If you develop any symptoms or have any followup questions please call your primary care clinic. If it is an emergency, please dial 911.      It is very important to check in with your provider--during a phone or clinic visit, talk about your hospital stay.  Tell the clinic provider how you feel.  Talk about the medications listed on the discharge papers.  Your doctor will update records, make sure you are still doing OK, and decide if any tests or medication changes are needed.      NOTE:   Your doctor has ordered home care to help you after your hospital stay.  The staff will contact you to schedule your first visit.  This service will be provided by Homecare.  If you have any question, or have not received a call within 48 hours of discharge, please call them at .  *please see homecare quality ratings for all homecares in your area at www.medicare.gov   Hand wrote on patient already-printed discharge paperwork, [facility] ([phone])

## 2020-03-23 NOTE — PROGRESS NOTES
[facility]    Urology Progress Note      ASSESSMENT: 64 year old M with Acute urinary retention (>2L) and suspected metastatic prostate cancer. Has seen Dr. Parsi some years ago. CT biopsy planned for today. Creat 1.6 today (down from 9.29 on admission). . Bone scan and CT biopsy pending.    Plan:    Irrigate catheter PRN for clots    Cont catheter for 2-3 weeks. RN to provide leg bag and teach cooper care    Has appointment for 31st to attempt voiding trial (AVS updated)      Dimitry Jasso PA-C 3/23/2020 12:29 PM  [facility]  Mon-Fri, 7am - 4pm  Office: [phone]      Interval History   Underwent CT guided bx of pelvic node this AM. Urine clear pink overnight and no clots present. High UOP. Has appointment for 31st.    Physical Exam   Temp: 99.3  F (37.4  C) Temp src: Oral BP: 127/68 Pulse: 70 Heart Rate: 76 Resp: 16 SpO2: 98 % O2 Device: None (Room air) Oxygen Delivery: 2 LPM  Vitals:    03/20/20 1550 03/21/20 0544 03/23/20 0453   Weight: 106.6 kg (235 lb) 101 kg (222 lb 11.2 oz) 100 kg (220 lb 6.4 oz)     Vital Signs with Ranges  Temp:  [98.3  F (36.8  C)-99.6  F (37.6  C)] 99.3  F (37.4  C)  Pulse:  [70-81] 70  Heart Rate:  [] 76  Resp:  [16] 16  BP: (123-160)/() 127/68  SpO2:  [93 %-100 %] 98 %  I/O last 3 completed shifts:  In: 535 [I.V.:535]  Out: 4700 [Urine:4700]    General: Patient awake, alert, NAD, lying in bed  Head: Normocephalic, atraumatic  Eyes: No icterus  Neck: Symmetric  Respiratory: Breathing unlabored  Cardiac: Skin well-perfused  GI: Non-distended  Genitourinary: Cooper in place draining lux pink urine  Skin: No visible rashes   Neurologic: No focal deficits  Neuropsychiatric: A&O x 3, responding appropriately      Medications     NaCl 100 mL/hr at 03/23/20 0818       HYDROcodone-acetaminophen  1 tablet Oral Once     senna-docusate  1 tablet Oral BID    Or     senna-docusate  2 tablet Oral BID     sodium chloride (PF)  3 mL Intracatheter  Q8H     sodium chloride (PF)  3 mL Intracatheter Q8H       Data   Results for orders placed or performed during the hospital encounter of 03/20/20 (from the past 24 hour(s))   Basic metabolic panel   Result Value Ref Range    Sodium 134 133 - 144 mmol/L    Potassium 4.4 3.4 - 5.3 mmol/L    Chloride 105 94 - 109 mmol/L    Carbon Dioxide 22 20 - 32 mmol/L    Anion Gap 7 3 - 14 mmol/L    Glucose 107 (H) 70 - 99 mg/dL    Urea Nitrogen 21 7 - 30 mg/dL    Creatinine 1.60 (H) 0.66 - 1.25 mg/dL    GFR Estimate 44 (L) >60 mL/min/[1.73_m2]    GFR Estimate If Black 51 (L) >60 mL/min/[1.73_m2]    Calcium 9.0 8.5 - 10.1 mg/dL   CBC with platelets differential   Result Value Ref Range    WBC 15.3 (H) 4.0 - 11.0 10e9/L    RBC Count 4.19 (L) 4.4 - 5.9 10e12/L    Hemoglobin 13.7 13.3 - 17.7 g/dL    Hematocrit 40.3 40.0 - 53.0 %    MCV 96 78 - 100 fl    MCH 32.7 26.5 - 33.0 pg    MCHC 34.0 31.5 - 36.5 g/dL    RDW 12.2 10.0 - 15.0 %    Platelet Count 327 150 - 450 10e9/L    Diff Method Automated Method     % Neutrophils 79.1 %    % Lymphocytes 8.7 %    % Monocytes 10.1 %    % Eosinophils 1.4 %    % Basophils 0.2 %    % Immature Granulocytes 0.5 %    Nucleated RBCs 0 0 /100    Absolute Neutrophil 12.1 (H) 1.6 - 8.3 10e9/L    Absolute Lymphocytes 1.3 0.8 - 5.3 10e9/L    Absolute Monocytes 1.6 (H) 0.0 - 1.3 10e9/L    Absolute Eosinophils 0.2 0.0 - 0.7 10e9/L    Absolute Basophils 0.0 0.0 - 0.2 10e9/L    Abs Immature Granulocytes 0.1 0 - 0.4 10e9/L    Absolute Nucleated RBC 0.0    INR   Result Value Ref Range    INR 1.13 0.86 - 1.14   NM Bone Scan Whole Body    Narrative    NUCLEAR MEDICINE WHOLE BODY BONE SCAN March 23, 2020 10:57 AM    HISTORY: Bone lesion, pelvis, incidental on CT, malignancy suspected.    COMPARISON:       Prior bone scan: None.       Other relevant study: A CT of the abdomen and pelvis on  3/20/2020.    TECHNIQUE: Following the uneventful intravenous administration of 26.8  mCi Tc99m MDP IV, routine delayed imaging  was performed of the entire  body.    IMAGES OBTAINED: Planar whole body anterior/posterior images and  planar head/neck oblique images.    FINDINGS: There are numerous foci of increased radiotracer uptake  consistent with diffuse osseous metastatic disease. This includes  involvement of the majority of the spine as well as several bilateral  ribs, both proximal humeri, both proximal femurs, and the distal left  femur. There is also increased uptake within the sacrum bilaterally.  Increased uptake adjacent to the wrists and knees is likely  degenerative in nature. There is also degenerative uptake in both  feet. No other abnormal osseous uptake is demonstrated. There appears  to be bilateral hydronephrosis. No other soft tissue abnormality is  seen.       Impression    IMPRESSION: Diffuse osseous metastatic disease.     SAMIR LEARY MD   CT guided biopsy, left iliac chain    Narrative    Phil Anaya MD     3/23/2020 11:55 AM  [facility]    Procedure: CT guided biopsy, left iliac chain    Date/Time: 3/23/2020 11:38 AM  Performed by: Phil Anaya MD  Authorized by: Phil Anaya MD     UNIVERSAL PROTOCOL   Site Marked: NA  Prior Images Obtained and Reviewed:  Yes  Required items: Required blood products, implants, devices and special   equipment available    Patient identity confirmed:  Verbally with patient, arm band, provided   demographic data and hospital-assigned identification number  Patient was reevaluated immediately before administering moderate or deep   sedation or anesthesia  Confirmation Checklist:  Patient's identity using two indicators, relevant   allergies, procedure was appropriate and matched the consent or emergent   situation and correct equipment/implants were available  Time out: Immediately prior to the procedure a time out was called    Universal Protocol: the Joint Commission Universal Protocol was followed    Preparation: Patient  was prepped and draped in usual sterile fashion    ESBL (mL):  5         ANESTHESIA    Anesthesia: Local infiltration  Local Anesthetic:  Lidocaine 1% without epinephrine      SEDATION    Patient Sedated: Yes    Vital signs: Vital signs monitored during sedation    See dictated procedure note for full details.  Findings: CT guided biopsy left iliac lymph node.  6 samples obtained with   18 g needle.  Samples sent to lab.  No complications.    Specimens: none    Complications: None    Condition: Stable    PROCEDURE   Patient Tolerance:  Patient tolerated the procedure well with no immediate   complications    Length of time physician/provider present for 1:1 monitoring during   sedation: 20

## 2020-03-23 NOTE — PRE-PROCEDURE
GENERAL PRE-PROCEDURE:   Procedure:  Image guided pelvic lymph node biopsy with moderate sedation  Date/Time:  3/23/2020 10:00 AM    Written consent obtained?: Yes    Risks and benefits: Risks, benefits and alternatives were discussed    Consent given by:  Patient  Patient states understanding of procedure being performed: Yes    Patient's understanding of procedure matches consent: Yes    Procedure consent matches procedure scheduled: Yes    Expected level of sedation:  Moderate  Appropriately NPO:  Yes  ASA Class:  Class 2- mild systemic disease, no acute problems, no functional limitations  Mallampati  :  Grade 2- soft palate, base of uvula, tonsillar pillars, and portion of posterior pharyngeal wall visible  Lungs:  Lungs clear with good breath sounds bilaterally  Heart:  Normal heart sounds and rate  History & Physical reviewed:  History and physical reviewed and no updates needed  Statement of review:  I have reviewed the lab findings, diagnostic data, medications, and the plan for sedation

## 2020-03-23 NOTE — PROCEDURES
[facility]    Procedure: CT guided biopsy, left iliac chain    Date/Time: 3/23/2020 11:38 AM  Performed by: Phil Anaya MD  Authorized by: Phil Anaya MD     UNIVERSAL PROTOCOL   Site Marked: NA  Prior Images Obtained and Reviewed:  Yes  Required items: Required blood products, implants, devices and special equipment available    Patient identity confirmed:  Verbally with patient, arm band, provided demographic data and hospital-assigned identification number  Patient was reevaluated immediately before administering moderate or deep sedation or anesthesia  Confirmation Checklist:  Patient's identity using two indicators, relevant allergies, procedure was appropriate and matched the consent or emergent situation and correct equipment/implants were available  Time out: Immediately prior to the procedure a time out was called    Universal Protocol: the Joint Commission Universal Protocol was followed    Preparation: Patient was prepped and draped in usual sterile fashion    ESBL (mL):  5         ANESTHESIA    Anesthesia: Local infiltration  Local Anesthetic:  Lidocaine 1% without epinephrine      SEDATION    Patient Sedated: Yes    Vital signs: Vital signs monitored during sedation    See dictated procedure note for full details.  Findings: CT guided biopsy left iliac lymph node.  6 samples obtained with 18 g needle.  Samples sent to lab.  No complications.    Specimens: none    Complications: None    Condition: Stable    PROCEDURE   Patient Tolerance:  Patient tolerated the procedure well with no immediate complications    Length of time physician/provider present for 1:1 monitoring during sedation: 20

## 2020-03-23 NOTE — CONSULTS
"Care Transition Initial Assessment - RN  Consulted for: Home Care  Met with: Patient.    DATA   Active Problems:    Hydronephrosis       Cognitive Status: awake, alert and oriented.    Contact information and PCP information verified: Yes  + PCP is Dr. Chacho Galan ([facility])     Insurance concerns: No Insurance issues identified  + Active [employer] MEDICARE ADVANTAGE     ASSESSMENT  Patient currently receives the following services:    + none, lives independently      Identified issues/concerns regarding health management:   + home care is recommended for cooper management   Pt/family was provided with the Medicare Compare list for Home Care.  Discussed associated Medicare star ratings to assist with choice for referrals/discharge planning Yes. Education was given to pt/family that star ratings are updated/maintained by Medicare and can be reviewed by visiting www.medicare.gov Yes.  + patient would like to utilize [facility] - referral sent   ADDENDUM: [facility] \"This patient has a [employer] Medicare Advantage plan that we do not accept. Referral will have to go elsewhere. Thank you!\" Clarified, covered homecare companies include \"[facility], [facility], [facility], [facility].\"  Pt utilized the Medicare.gov tool to aid in selection of [facility].  I called the referral to their intake department ([phone]) and faxed facesheet and orders ([phone]).  Pt has Homecare phone number.    PLAN  Financial costs for the patient include: per insurance .  Patient given options and choices for discharge: YES .  Patient/family is agreeable to the plan?  Yes.   Patient anticipates discharging to: HOME  .  Patient anticipates needs for home equipment: No  Transportation/person available to transport on day of discharge  is: Friend   Plan/Disposition: Home with HC  Appointments:   + pt will schedule own " (information added to AVS)    If an appointment was recommended for you please call ASAP to schedule.   DUE TO COVID-19 PANDEMIC, MANY CLINICS ARE TEMPORARILY NOT TAKING IN-PERSON APPOINTMENTS. The clinic may schedule you for a phone visit--please answer your phone. If you develop any symptoms or have any followup questions please call your primary care clinic. If it is an emergency, please dial 911.      It is very important to check in with your provider--during a phone or clinic visit, talk about your hospital stay.  Tell the clinic provider how you feel.  Talk about the medications listed on the discharge papers.  Your doctor will update records, make sure you are still doing OK, and decide if any tests or medication changes are needed.       NOTE:   Your doctor has ordered home care to help you after your hospital stay.  The staff will contact you to schedule your first visit.  This service will be provided by Homecare.  If you have any question, or have not received a call within 48 hours of discharge, please call them at .  *please see homecare quality ratings for all homecares in your area at www.medicare.gov   Hand wrote on patient already-printed discharge paperwork, [facility] ([phone])     Care  (CTS) will continue to follow as needed.    Marianna Henry RN, BSN, PHN  [facility] [place] [facility] Coordination  [facility]   Mobile: [phone]

## 2020-03-23 NOTE — PLAN OF CARE
Discharge    Patient discharged to home via own transporation with self. Pt drove himself here to ED, parked in east ramp.   Care plan note: vss, mild pain to left abd and flank. PRN tylenol given. Biopsy done to left groin, tolerated well. Bone scan done as well. All results will be follow up by urology and oncology clinic. Will have home RN to manage cooper and labs. Pt was taught how to empty catheter bag and change to leg bag if needed. Pt verbalized understanding.     Listed belongings gathered and returned to patient. Yes  Care Plan and Patient education resolved: Yes  Prescriptions if needed, hard copies sent with patient  NA  Home and hospital acquired medications returned to patient: NA  Medication Bin checked and emptied on discharge Yes  Follow up appointment made for patient: Yes

## 2020-03-24 LAB — INTERPRETATION ECG - MUSE: NORMAL

## 2020-03-25 LAB — COPATH REPORT: NORMAL

## 2020-03-26 ENCOUNTER — MEDICAL CORRESPONDENCE (OUTPATIENT)
Dept: FAMILY MEDICINE | Facility: CLINIC | Age: 65
End: 2020-03-26

## 2020-03-27 DIAGNOSIS — N17.9 ACUTE KIDNEY FAILURE, UNSPECIFIED (H): Primary | ICD-10-CM

## 2020-03-27 DIAGNOSIS — R33.9 RETENTION OF URINE, UNSPECIFIED: ICD-10-CM

## 2020-03-27 PROCEDURE — 36415 COLL VENOUS BLD VENIPUNCTURE: CPT | Performed by: FAMILY MEDICINE

## 2020-03-27 PROCEDURE — 80048 BASIC METABOLIC PNL TOTAL CA: CPT | Mod: 90 | Performed by: FAMILY MEDICINE

## 2020-03-27 NOTE — PROGRESS NOTES
green heparin tube for BMP from  home infusion - ok'd per Triage Neda WILLS RN    Called [facility] - ok for BMP  Roxana Whitfield MA March 27, 2020 4:00 PM      Faxed today to [phone]  Attn Vernon Whitfield MA March 30, 2020 2:01 PM

## 2020-03-27 NOTE — LETTER
[facility] [address] [place]  [address]  Phone: [phone]    March 30, 2020      Andre Serrano  [address] [place] [id number]              Dear Andre,    Here is a copy of your labs, we will discuss them at your upcoming visit.         Sincerely,     Chacho Galan M.D.    Results for orders placed or performed in visit on 03/27/20   Basic Metabolic Panel (8) ([facility])     Status: Abnormal   Result Value Ref Range    Glucose CANCELED mg/dL    Urea Nitrogen 25 8 - 27 mg/dL    Creatinine 1.65 (H) 0.76 - 1.27 mg/dL    eGFR If NonAfricn Am 43 (L) >59 mL/min/1.73    eGFR If Africn Am 50 (L) >59 mL/min/1.73    BUN/Creatinine Ratio 15 10 - 24    Sodium 133 (L) 134 - 144 mmol/L    Potassium CANCELED mmol/L    Chloride 96 96 - 106 mmol/L    Total CO2 20 20 - 29 mmol/L    Calcium 9.1 8.6 - 10.2 mg/dL    Narrative    Performed at:  01 - [facility] [address], [place], [place]  [id number]  : Cristian Raymundo MD, Phone:  [phone]

## 2020-03-28 LAB
BUN SERPL-MCNC: 25 MG/DL (ref 8–27)
BUN/CREATININE RATIO: 15 (ref 10–24)
CALCIUM SERPL-MCNC: 9.1 MG/DL (ref 8.6–10.2)
CHLORIDE SERPLBLD-SCNC: 96 MMOL/L (ref 96–106)
CREAT SERPL-MCNC: 1.65 MG/DL (ref 0.76–1.27)
EGFR IF AFRICN AM: 50 ML/MIN/1.73
EGFR IF NONAFRICN AM: 43 ML/MIN/1.73
GLUCOSE SERPL-MCNC: ABNORMAL MG/DL (ref 70–99)
POTASSIUM SERPL-SCNC: ABNORMAL MMOL/L
SODIUM SERPL-SCNC: 133 MMOL/L (ref 134–144)
TOTAL CO2: 20 MMOL/L (ref 20–29)

## 2020-03-29 NOTE — RESULT ENCOUNTER NOTE
Dear Andre,  Here is a copy of your labs, we will discuss them at your upcoming visit.  Chacho Galan MD

## 2020-04-02 ENCOUNTER — TRANSFERRED RECORDS (OUTPATIENT)
Dept: FAMILY MEDICINE | Facility: CLINIC | Age: 65
End: 2020-04-02

## 2020-04-03 ENCOUNTER — OFFICE VISIT (OUTPATIENT)
Dept: FAMILY MEDICINE | Facility: CLINIC | Age: 65
End: 2020-04-03

## 2020-04-03 ENCOUNTER — MEDICAL CORRESPONDENCE (OUTPATIENT)
Dept: FAMILY MEDICINE | Facility: CLINIC | Age: 65
End: 2020-04-03

## 2020-04-03 VITALS
TEMPERATURE: 98.1 F | SYSTOLIC BLOOD PRESSURE: 118 MMHG | HEART RATE: 92 BPM | BODY MASS INDEX: 33.03 KG/M2 | RESPIRATION RATE: 16 BRPM | DIASTOLIC BLOOD PRESSURE: 70 MMHG | WEIGHT: 217.2 LBS

## 2020-04-03 DIAGNOSIS — C79.51 BONE METASTASIS: Primary | ICD-10-CM

## 2020-04-03 DIAGNOSIS — N17.9 ACUTE RENAL FAILURE, UNSPECIFIED ACUTE RENAL FAILURE TYPE (H): ICD-10-CM

## 2020-04-03 PROCEDURE — 99495 TRANSJ CARE MGMT MOD F2F 14D: CPT | Mod: 25 | Performed by: FAMILY MEDICINE

## 2020-04-03 PROCEDURE — 80048 BASIC METABOLIC PNL TOTAL CA: CPT | Mod: 90 | Performed by: FAMILY MEDICINE

## 2020-04-03 PROCEDURE — 36415 COLL VENOUS BLD VENIPUNCTURE: CPT | Performed by: FAMILY MEDICINE

## 2020-04-03 RX ORDER — PREDNISONE 5 MG/1
5 TABLET ORAL 2 TIMES DAILY
COMMUNITY
Start: 2020-04-02

## 2020-04-03 NOTE — PROGRESS NOTES
Problem(s) Oriented visit        SUBJECTIVE:                                                    Andre Serrano is a 64 year old male who presents to clinic today for the following health issues :        Hospital Follow-up Visit:    Hospital/Nursing Home/IP Rehab Facility: [facility]  Date of Admission: 3/20  Date of Discharge: 3/23  Reason(s) for Admission: metatstatic Prostate cancer with ARF and urinary obstruction            Problems taking medications regularly:  None       Medication changes since discharge: None       Problems adhering to non-medication therapy:  None    Summary of hospitalization:  [facility] discharge summary reviewed  Diagnostic Tests/Treatments reviewed.  Follow up needed: seeing Oncology and Urology  Other Healthcare Providers Involved in Patient s Care:         None  Update since discharge: improved. 1000%    Post Discharge Medication Reconciliation: medication reconcilation previously completed during another office visit.  Plan of care communicated with patient     Coding guidelines for this visit:  Type of Medical   Decision Making Face-to-Face Visit       within 7 Days of discharge Face-to-Face Visit        within 14 days of discharge   Moderate Complexity [id number]   High Complexity 86755 [id number]              1. Bone metastasis (H)  Reports and bone scan show extensive mets.    2. Acute renal failure, unspecified acute renal failure type (H)  Last Cr down to 1.6  - Basic Metabolic Panel (8) ([facility])       Problem list, Medication list, Allergies, and Medical/Social/Surgical histories reviewed in EPIC and updated as appropriate.   Additional history: as documented    ROS:  General and CV completed and negative except as noted above    Histories:   Patient Active Problem List   Diagnosis     Family history of melanoma     History of elevated PSA     Alcohol abuse     Obese     FRANK (obstructive sleep apnea)     Obesity (BMI 35.0-39.9) with comorbidity (H)      Hydronephrosis     Past Surgical History:   Procedure Laterality Date     APPENDECTOMY  1972     HERNIORRHAPHY INGUINAL INFANT         Social History     Tobacco Use     Smoking status: Passive Smoke Exposure - Never Smoker     Smokeless tobacco: Never Used     Tobacco comment: occasion   Substance Use Topics     Alcohol use: Yes     Alcohol/week: 16.7 standard drinks     Types: 20 Cans of beer per week     Family History   Problem Relation Age of Onset     Prostate Cancer Father      Brain Cancer Father      Uterine Cancer Mother      Coronary Artery Disease Maternal Grandfather      Diabetes No family hx of            OBJECTIVE:                                                    /70   Pulse 92   Temp 98.1  F (36.7  C) (Oral)   Resp 16   Wt 98.5 kg (217 lb 3.2 oz)   BMI 33.03 kg/m    Body mass index is 33.03 kg/m .   APPEARANCE: = Relaxed and in no distress  Conj/Eyelids = noninjected and lids and lashes are without inflammation  PERRLA/Irises = Pupils Round Reactive to Light and Irisis without inflammation  Neck = No anterior or posterior adenopathy appreciated.  Thyroid = Not enlarged and no masses felt  Resp effort = Calm regular breathing  Breath Sounds = Good air movement with no rales or rhonchi in any lung fields  Heart Rate, Rhythm, & sounds (no Murm)  = Regular rate and rhythm with no S3, S4, or murmur appreciated.  Carotid Art's = Pulses full and equal and no bruits appreciated  Abdomen = Soft, nontender, no masses, & bowel sounds in all quadrants  Liver/Spleen = Normal span and no splenomegaly noted  Digits and Nails = FROM in all finger joints, no nail dystrophy  Ext (edema) = No pretibial edema noted or elsewhere  Musculsktl =  Strength and ROM of major joints are within normal limits  SKIN = absent significant rashes or lesions   Recent/Remote Memory = Alert and Oriented x 3  Mood/Affect = Cooperative and interested     ASSESSMENT/PLAN:                                                   "    BMI:   Estimated body mass index is 33.03 kg/m  as calculated from the following:    Height as of 3/20/20: 1.727 m (5' 8\").    Weight as of this encounter: 98.5 kg (217 lb 3.2 oz).   Weight management plan: will hold for now.      Andre was seen today for hospital f/u.    Diagnoses and all orders for this visit:    Bone metastasis (H)    Acute renal failure, unspecified acute renal failure type (H)  -     Basic Metabolic Panel (8) ([facility])        Will see oncology and Urology,   Still has the catheter in place.  Really feeling quite a bit better.    There are no Patient Instructions on file for this visit.    The following health maintenance items are reviewed in Epic and correct as of today:  Health Maintenance   Topic Date Due     ADVANCE CARE PLANNING  1955     HIV SCREENING  09/[birth year]/1969     FALL RISK ASSESSMENT  09/18/2019     PNEUMOCOCCAL IMMUNIZATION 65+ LOW/MEDIUM RISK (1 of 2 - PCV13) 09/18/2019     PHQ-2  01/01/2020     MEDICARE ANNUAL WELLNESS VISIT  09/10/2020     COLORECTAL CANCER SCREENING  01/21/2023     LIPID  09/10/2024     DTAP/TDAP/TD IMMUNIZATION (2 - Td) 06/25/2025     HEPATITIS C SCREENING  Completed     INFLUENZA VACCINE  Completed     ZOSTER IMMUNIZATION  Completed     AORTIC ANEURYSM SCREENING (SYSTEM ASSIGNED)  Completed     IPV IMMUNIZATION  Aged Out     MENINGITIS IMMUNIZATION  Aged Out       Chacho Galan MD  [facility]  Family Practice  [facility]  [phone]    For any issues my office # is [phone]        "

## 2020-04-04 LAB
BUN SERPL-MCNC: 25 MG/DL (ref 8–27)
BUN/CREATININE RATIO: 18 (ref 10–24)
CALCIUM SERPL-MCNC: 9.4 MG/DL (ref 8.6–10.2)
CHLORIDE SERPLBLD-SCNC: 101 MMOL/L (ref 96–106)
CREAT SERPL-MCNC: 1.36 MG/DL (ref 0.76–1.27)
EGFR IF AFRICN AM: 63 ML/MIN/1.73
EGFR IF NONAFRICN AM: 54 ML/MIN/1.73
GLUCOSE SERPL-MCNC: 131 MG/DL (ref 65–99)
POTASSIUM SERPL-SCNC: 4.6 MMOL/L (ref 3.5–5.2)
SODIUM SERPL-SCNC: 136 MMOL/L (ref 134–144)
TOTAL CO2: 22 MMOL/L (ref 20–29)

## 2020-04-06 NOTE — RESULT ENCOUNTER NOTE
Dear Andre,   I am writing to report that your included test results are within expected ranges. I do not suggest that we make any changes at this time.  Chacho Galan M.D.

## 2020-04-07 ENCOUNTER — TRANSFERRED RECORDS (OUTPATIENT)
Dept: FAMILY MEDICINE | Facility: CLINIC | Age: 65
End: 2020-04-07

## 2020-04-09 ENCOUNTER — MEDICAL CORRESPONDENCE (OUTPATIENT)
Dept: FAMILY MEDICINE | Facility: CLINIC | Age: 65
End: 2020-04-09

## 2020-04-21 DIAGNOSIS — C61 PROSTATE CANCER (H): Primary | ICD-10-CM

## 2020-04-21 NOTE — PROGRESS NOTES
[facility] Dr Amrik Paris fax [phone]- total testosterone, total PSA-diagnostic  Roxana Whitfield MA April 21, 2020 9:28 AM  Has 4/27 appt

## 2020-04-27 DIAGNOSIS — C61 PROSTATE CANCER (H): ICD-10-CM

## 2020-04-27 PROCEDURE — 36415 COLL VENOUS BLD VENIPUNCTURE: CPT

## 2020-04-27 PROCEDURE — 84403 ASSAY OF TOTAL TESTOSTERONE: CPT | Mod: 90

## 2020-04-27 PROCEDURE — 84153 ASSAY OF PSA TOTAL: CPT | Mod: 90

## 2020-04-27 NOTE — PROGRESS NOTES
[facility] Dr Amrik Paris fax [phone]-    psa total/diagnostic reflex to free  Testosterone    Faxed today  Roxana Whitfield MA April 28, 2020 8:00 AM

## 2020-04-28 LAB
.: ABNORMAL
PSA NG/ML: 20.7 NG/ML (ref 0–4)
TESTOST SERPL-MCNC: 8 NG/DL (ref 264–916)

## 2020-05-05 ENCOUNTER — HOSPITAL ENCOUNTER (INPATIENT)
Facility: CLINIC | Age: 65
LOS: 2 days | Discharge: HOME | DRG: 683 | End: 2020-05-08
Attending: EMERGENCY MEDICINE | Admitting: INTERNAL MEDICINE
Payer: COMMERCIAL

## 2020-05-05 ENCOUNTER — TRANSFERRED RECORDS (OUTPATIENT)
Dept: HEALTH INFORMATION MANAGEMENT | Facility: CLINIC | Age: 65
End: 2020-05-05

## 2020-05-05 ENCOUNTER — TRANSFERRED RECORDS (OUTPATIENT)
Dept: FAMILY MEDICINE | Facility: CLINIC | Age: 65
End: 2020-05-05

## 2020-05-05 DIAGNOSIS — N17.9 ACUTE RENAL FAILURE, UNSPECIFIED ACUTE RENAL FAILURE TYPE (H): ICD-10-CM

## 2020-05-05 DIAGNOSIS — N13.9 OBSTRUCTIVE UROPATHY: ICD-10-CM

## 2020-05-05 PROBLEM — N17.0 ACUTE KIDNEY INJURY (AKI) WITH ACUTE TUBULAR NECROSIS (ATN) (H): Status: ACTIVE | Noted: 2020-05-05

## 2020-05-05 LAB
ALBUMIN SERPL-MCNC: 3 G/DL (ref 3.4–5)
ALP SERPL-CCNC: 105 U/L (ref 40–150)
ALT SERPL W P-5'-P-CCNC: 25 U/L (ref 0–70)
ALT SERPL-CCNC: 23 IU/L (ref 8–45)
ANION GAP SERPL CALCULATED.3IONS-SCNC: 11 MMOL/L (ref 3–14)
AST SERPL W P-5'-P-CCNC: 13 U/L (ref 0–45)
AST SERPL-CCNC: 16 IU/L (ref 2–40)
BASOPHILS # BLD AUTO: 0 10E9/L (ref 0–0.2)
BASOPHILS NFR BLD AUTO: 0.2 %
BILIRUB SERPL-MCNC: 0.5 MG/DL (ref 0.2–1.3)
BUN SERPL-MCNC: 74 MG/DL (ref 7–30)
CALCIUM SERPL-MCNC: 7.1 MG/DL (ref 8.5–10.1)
CHLORIDE SERPL-SCNC: 106 MMOL/L (ref 94–109)
CO2 SERPL-SCNC: 17 MMOL/L (ref 20–32)
CREAT SERPL-MCNC: 4.06 MG/DL (ref 0.66–1.25)
CREAT SERPL-MCNC: 4.35 MG/DL (ref 0.72–1.25)
DIFFERENTIAL METHOD BLD: ABNORMAL
EOSINOPHIL # BLD AUTO: 0 10E9/L (ref 0–0.7)
EOSINOPHIL NFR BLD AUTO: 0.3 %
ERYTHROCYTE [DISTWIDTH] IN BLOOD BY AUTOMATED COUNT: 12.7 % (ref 10–15)
ETHANOL SERPL-MCNC: <0.01 G/DL
GFR SERPL CREATININE-BSD FRML MDRD: 14 ML/MIN/1.73M2
GFR SERPL CREATININE-BSD FRML MDRD: 14 ML/MIN/{1.73_M2}
GLUCOSE SERPL-MCNC: 111 MG/DL (ref 70–99)
GLUCOSE SERPL-MCNC: 118 MG/DL (ref 65–100)
HCT VFR BLD AUTO: 32.7 % (ref 40–53)
HGB BLD-MCNC: 11 G/DL (ref 13.3–17.7)
IMM GRANULOCYTES # BLD: 0.1 10E9/L (ref 0–0.4)
IMM GRANULOCYTES NFR BLD: 0.4 %
LYMPHOCYTES # BLD AUTO: 1.2 10E9/L (ref 0.8–5.3)
LYMPHOCYTES NFR BLD AUTO: 11 %
MAGNESIUM SERPL-MCNC: 2.6 MG/DL (ref 1.6–2.3)
MCH RBC QN AUTO: 32 PG (ref 26.5–33)
MCHC RBC AUTO-ENTMCNC: 33.6 G/DL (ref 31.5–36.5)
MCV RBC AUTO: 95 FL (ref 78–100)
MONOCYTES # BLD AUTO: 0.5 10E9/L (ref 0–1.3)
MONOCYTES NFR BLD AUTO: 4.5 %
NEUTROPHILS # BLD AUTO: 9.4 10E9/L (ref 1.6–8.3)
NEUTROPHILS NFR BLD AUTO: 83.6 %
NRBC # BLD AUTO: 0 10*3/UL
NRBC BLD AUTO-RTO: 0 /100
PLATELET # BLD AUTO: 365 10E9/L (ref 150–450)
POTASSIUM SERPL-SCNC: 4.4 MMOL/L (ref 3.4–5.3)
POTASSIUM SERPL-SCNC: 4.5 MMOL/L (ref 3.5–5)
PROT SERPL-MCNC: 7.2 G/DL (ref 6.8–8.8)
RBC # BLD AUTO: 3.44 10E12/L (ref 4.4–5.9)
SODIUM SERPL-SCNC: 134 MMOL/L (ref 133–144)
WBC # BLD AUTO: 11.2 10E9/L (ref 4–11)

## 2020-05-05 PROCEDURE — 99220 ZZC INITIAL OBSERVATION CARE,LEVL III: CPT | Performed by: INTERNAL MEDICINE

## 2020-05-05 PROCEDURE — 51798 US URINE CAPACITY MEASURE: CPT

## 2020-05-05 PROCEDURE — 80320 DRUG SCREEN QUANTALCOHOLS: CPT | Performed by: EMERGENCY MEDICINE

## 2020-05-05 PROCEDURE — G0378 HOSPITAL OBSERVATION PER HR: HCPCS

## 2020-05-05 PROCEDURE — 80053 COMPREHEN METABOLIC PANEL: CPT | Performed by: EMERGENCY MEDICINE

## 2020-05-05 PROCEDURE — 83735 ASSAY OF MAGNESIUM: CPT | Performed by: EMERGENCY MEDICINE

## 2020-05-05 PROCEDURE — 85025 COMPLETE CBC W/AUTO DIFF WBC: CPT | Performed by: EMERGENCY MEDICINE

## 2020-05-05 PROCEDURE — 25800030 ZZH RX IP 258 OP 636: Performed by: INTERNAL MEDICINE

## 2020-05-05 PROCEDURE — 51700 IRRIGATION OF BLADDER: CPT

## 2020-05-05 PROCEDURE — 99285 EMERGENCY DEPT VISIT HI MDM: CPT | Mod: 25

## 2020-05-05 PROCEDURE — 25000132 ZZH RX MED GY IP 250 OP 250 PS 637: Performed by: INTERNAL MEDICINE

## 2020-05-05 RX ORDER — PREDNISONE 5 MG/1
5 TABLET ORAL DAILY
Status: DISCONTINUED | OUTPATIENT
Start: 2020-05-06 | End: 2020-05-08 | Stop reason: HOSPADM

## 2020-05-05 RX ORDER — ABIRATERONE ACETATE 250 MG/1
1000 TABLET ORAL DAILY
Status: DISCONTINUED | OUTPATIENT
Start: 2020-05-06 | End: 2020-05-08 | Stop reason: HOSPADM

## 2020-05-05 RX ORDER — NALOXONE HYDROCHLORIDE 0.4 MG/ML
.1-.4 INJECTION, SOLUTION INTRAMUSCULAR; INTRAVENOUS; SUBCUTANEOUS
Status: DISCONTINUED | OUTPATIENT
Start: 2020-05-05 | End: 2020-05-08 | Stop reason: HOSPADM

## 2020-05-05 RX ORDER — ONDANSETRON 2 MG/ML
4 INJECTION INTRAMUSCULAR; INTRAVENOUS EVERY 6 HOURS PRN
Status: DISCONTINUED | OUTPATIENT
Start: 2020-05-05 | End: 2020-05-08 | Stop reason: HOSPADM

## 2020-05-05 RX ORDER — AMOXICILLIN 250 MG
2 CAPSULE ORAL 2 TIMES DAILY
Status: DISCONTINUED | OUTPATIENT
Start: 2020-05-05 | End: 2020-05-06 | Stop reason: DRUGHIGH

## 2020-05-05 RX ORDER — ONDANSETRON 4 MG/1
4 TABLET, ORALLY DISINTEGRATING ORAL EVERY 6 HOURS PRN
Status: DISCONTINUED | OUTPATIENT
Start: 2020-05-05 | End: 2020-05-08 | Stop reason: HOSPADM

## 2020-05-05 RX ORDER — ACETAMINOPHEN 325 MG/1
650 TABLET ORAL EVERY 4 HOURS PRN
Status: DISCONTINUED | OUTPATIENT
Start: 2020-05-05 | End: 2020-05-08 | Stop reason: HOSPADM

## 2020-05-05 RX ORDER — ACETAMINOPHEN 650 MG/1
650 SUPPOSITORY RECTAL EVERY 4 HOURS PRN
Status: DISCONTINUED | OUTPATIENT
Start: 2020-05-05 | End: 2020-05-08 | Stop reason: HOSPADM

## 2020-05-05 RX ORDER — SODIUM CHLORIDE 9 MG/ML
INJECTION, SOLUTION INTRAVENOUS CONTINUOUS
Status: DISCONTINUED | OUTPATIENT
Start: 2020-05-05 | End: 2020-05-08

## 2020-05-05 RX ORDER — ABIRATERONE ACETATE 250 MG/1
1000 TABLET ORAL DAILY
Status: ON HOLD | COMMUNITY
End: 2021-01-01

## 2020-05-05 RX ADMIN — SODIUM CHLORIDE: 9 INJECTION, SOLUTION INTRAVENOUS at 18:02

## 2020-05-05 RX ADMIN — OYSTER SHELL CALCIUM WITH VITAMIN D 2 TABLET: 500; 200 TABLET, FILM COATED ORAL at 18:59

## 2020-05-05 ASSESSMENT — MIFFLIN-ST. JEOR: SCORE: 1789.17

## 2020-05-05 NOTE — ED PROVIDER NOTES
History     Chief Complaint:  Abnormal Labs    HPI   Andre Serrano is a 64 year old male with a hisotry of metastatic prostate cancer who presents with abnormal labs. The patient was admitted to the hospital on 3/23/20 for a creatinine of 9 with urinary symptoms.He was shows to have widespread metastatic disease including humeri femurs, sacrum, and ribs in addition to the majority of the spine with a bone scan. He started abiraterone and started on 5/2/20. He had his cooper catheter removed on 4/30/20.     The patient went to his appointment at [facility] today for follow up for hematology and metastatic prostate cancer. When he had blood work done, his creatinine was shone to be 4.7 with a BUN of 76. they help the dose of Zometa and administered IV fluids. They arranged for an ultrasound of kidneys today which showed obstruction, so he was sent here. He reported to [facility] that his PSA was down to 20. The plan was to have his return to the office tomorrow for stat creatinine recheck.      Laboratory 5/5/20  Creatinine 4.7 (HH)  BUN 76 (H)    Laboratory 4/3/20  Creatinine 1.36 (H)  BUN 25    US Renal Bilateral 5/5/20  Bilateral hydronephrosis and bladder distention with Cooper catheter in place, question obstructed Cooper catheter.   As read by Radiology.    Allergies:  No known drug allergies    Medications:    Deltasone  Senna-docusate  Abiraterone  Sodium chloride IV  Calcium carbonate   Vitamin D    Past Medical History:    Cancer  Alcohol abuse  Hypercholesteremia  Hydronephrosis  Obstructive sleep apnea  Obesity    Past Surgical History:    Appendectomy  Herniorrhaphy inguinal infant    Family History:    Prostate cancer (Father)  Brain Cancer (Father)  Uterine Cancer (Mother)    Social History:  Smoking status: Former, quit 2015  Alcohol use: Yes, 20 beers/week  Drug use: Yes, Marijuana  Retired  from [employer]  Lives in apartment by himself.  PCP: Chacho Braun  "[place]  Marital Status:  Single [1]    Review of Systems   All other systems reviewed and are negative.        Physical Exam     Patient Vitals for the past 24 hrs:   BP Temp Temp src Pulse Resp SpO2 Height Weight   05/05/20 1510 132/82 97.6  F (36.4  C) Oral 101 20 97 % 1.727 m (5' 8\") 103 kg (227 lb)     Physical Exam  Vitals signs reviewed.   Constitutional:       Appearance: He is obese.   HENT:      Nose: Nose normal.   Cardiovascular:      Rate and Rhythm: Normal rate and regular rhythm.   Pulmonary:      Effort: Pulmonary effort is normal.   Abdominal:      General: Abdomen is flat.      Comments: Mild distention in the suprapubic region tender with palpation   Genitourinary:     Penis: Normal.    Musculoskeletal: Normal range of motion.   Skin:     General: Skin is warm.      Capillary Refill: Capillary refill takes less than 2 seconds.   Neurological:      General: No focal deficit present.      Mental Status: He is alert.   Psychiatric:         Mood and Affect: Mood normal.           Emergency Department Course   Laboratory:  CBC: WBC 11.2 (H), HGB 11.0 (L) o/w WNL ()  CMP: Carbon dioxide 17 (L), Glucose 111 (H), Creatinine 4.06 (H), GFR 14 (L), Calcium 7.1 (L), Albumin 3.0 (L) o/w WNL  Alcohol ethyl: <0.01  Magnesium: 2.6 (H)    Procedures:  None    Emergency Department Course:  Past medical records, nursing notes, and vitals reviewed.  1515: I performed an exam of the patient and obtained history, as documented above.  IV inserted and blood drawn.    1538: I spoke to Dr. Nguyen of [facility] who sent the patient here.    1604: I spoke to Dr. Ratliff of urology.     1609: Findings and plan explained to the Patient who consents to admission.     1633: Discussed the patient with Dr. Cabrera, who will admit the patient to an observation unit bed for further monitoring, evaluation, and treatment.      Impression & Plan    Medical Decision Making:  Patient presents with acute renal " failure from urology.  Patient has a Pride catheter in that was initially draining a small amount of yellow urine.  However bladder scan shows significant urine retention.  Ultrasound was performed by oncology that does suggest post obstructive uropathy.  Pride catheter was flushed and drained and urine drained over a liter of urine in the emergency room.  Care was discussed with the on-call urologist as well as primary oncology due to patient's history of alcoholism unsure the cause of urine retention but for now will admit on observation to recheck creatinine after diuresis post a post resolution of catheter obstruction.  Care was discussed with the hospitalist.    Diagnosis:    ICD-10-CM    1. Acute renal failure, unspecified acute renal failure type (H)  N17.9    2. Obstructive uropathy  N13.9      Disposition:  Admitted to observation unit    Abraham Zhou  5/5/2020    EMERGENCY DEPARTMENT  Abraham WU, patty serving as a scribe at 3:15 PM on 5/5/2020 to document services personally performed by Phil Santacruz MD based on my observations and the provider's statements to me.        Phil Santacruz MD  05/10/20 1837

## 2020-05-05 NOTE — ED NOTES
Bladder scan >999mL, cooper was irrigated with no pull back, catheter was repositioned with significant urine return.

## 2020-05-05 NOTE — PROGRESS NOTES
RECEIVING UNIT ED HANDOFF REVIEW    ED Nurse Handoff Report was reviewed by: Keri Fletcher RN on May 5, 2020 at 5:35 PM

## 2020-05-05 NOTE — PHARMACY-ADMISSION MEDICATION HISTORY
Pharmacy Medication History  Admission medication history interview status for the 5/5/2020  admission is complete. See EPIC admission navigator for prior to admission medications     Medication history sources: Patient and Pharmacy ([facility]  Medication history source reliability: Good  Adherence assessment: Good    Significant changes made to the medication list:  -recently started on Zytiga and prednisone.      Additional medication history information:       Medication reconciliation completed by provider prior to medication history? No    Time spent in this activity: 10 min      Prior to Admission medications    Medication Sig Last Dose Taking? Auth Provider   abiraterone (ZYTIGA) 250 MG tablet Take 1,000 mg by mouth daily 5/5/2020 at Unknown time Yes Unknown, Entered By History   CALCIUM-VITAMIN D PO Take 2 tablets by mouth daily  Yes Unknown, Entered By History   predniSONE (DELTASONE) 5 MG tablet Take 5 mg by mouth daily  5/5/2020 at Unknown time Yes Reported, Patient   senna-docusate (SENOKOT-S/PERICOLACE) 8.6-50 MG tablet Take 2 tablets by mouth 2 times daily Past Month at Unknown time Yes Luis Carlos Russell, DO

## 2020-05-05 NOTE — ED NOTES
"[facility]  ED Nurse Handoff Report    ED Chief complaint: Abnormal Labs      ED Diagnosis:   Final diagnoses:   Acute renal failure, unspecified acute renal failure type (H)   Obstructive uropathy       Code Status: Full Code    Allergies: No Known Allergies    Patient Story: LUQ pain  Focused Assessment: pt was seen at his oncologist today with blood work. Abnormal labs of creatinine 4.7. pt was sent to ED for further care, catheter was repositioned with significant amount of drainage from bag. Pt notes relief from bladder. Pt will be admitted for obs until creat looks better    Treatments and/or interventions provided: IVF, irrigation  Patient's response to treatments and/or interventions: good    To be done/followed up on inpatient unit:  monitor creatinine    Does this patient have any cognitive concerns?: none    Activity level - Baseline/Home:  Independent  Activity Level - Current:   Independent    Patient's Preferred language: English   Needed?: No    Isolation: None  Infection: Not Applicable  Bariatric?: No    Vital Signs:   Vitals:    05/05/20 1510   BP: 132/82   Pulse: 101   Resp: 20   Temp: 97.6  F (36.4  C)   TempSrc: Oral   SpO2: 97%   Weight: 103 kg (227 lb)   Height: 1.727 m (5' 8\")       Cardiac Rhythm:     Was the PSS-3 completed:   Yes  What interventions are required if any?               Family Comments: none  OBS brochure/video discussed/provided to patient/family: Yes              Name of person given brochure if not patient: pt              Relationship to patient: pt    For the majority of the shift this patient's behavior was Green.   Behavioral interventions performed were none.    ED NURSE PHONE NUMBER: *[phone]         "

## 2020-05-05 NOTE — ED TRIAGE NOTES
Pt was dx with prostate cancer on March 21 - pt been following up with oncology urology and PMD   Abnormal labs this morning and xray told to come to ER

## 2020-05-05 NOTE — H&P
[facility]    History and Physical  Hospitalist       Date of Admission:  5/5/2020    Assessment & Plan   Andre Serrano is a 64 year old male with who presents with recent diagnosis of metastatic prostate cancer on chemotherapy, he was recently admitted and treated at [facility] 3/20 to 3/23/20 for obstructive uropathy and being re-admitted to [facility] for same diagnosis.     Acute kidney injury  sCr to 4.06 (BUN 74) on presentation; Pt seen by his oncologist on day of presentation where sCr was elevated to 4.35 and US showed obstruction w/ bilateral hydronephrosis and bladder distension. With cooper flush >1L of urine drained. Likely post-renal due to renal obstruction.  -Continue NS at 100cc/hr   - Monitor I/O  - Dose all renally metabolized meds for GFR < 10  - Avoid nephrotoxins if possible  - Monitor lytes Ca, Mg, Phos  - If sCr continues to trend down, likely discharge home on 5/6 with cooper to follow up with his Urologist and Oncologist    Leucocytosis  WBC to 11.2 on presentation; likely stress de-margination.  - Treat number 1 above  - Monitor with repeat lab in AM    Electrolyte derangement  CO2 17; likely due to ANITA.  - Treatment as above   - Monitor     Prostate CA with mets  On abiraterone and prednisone PTA.  - Continue PTA meds  - F/u with Oncologist     FEN/GI: NS/Replete PRN/Enteral   VTE PPX: Ambulate  Home Medication List Reconciliation: Completed  Access: PIV  Code Status: Full  Disposition: Anticipate greater than 2 midnights.    JUDD Fischer MD  Hospitalist Service  [phone] (P)  Text Page 12P to 10P    Primary Care Physician   Chacho Galan    Chief Complaint   Abnormal labs.    History is obtained from the patient    History of Present Illness   Andre Serrano is a 64 year old male with who presents with recent diagnosis of metastatic prostate cancer on chemotherapy, he was recently admitted and treated at [facility] 3/20 to 3/23/20 for obstructive uropathy and  discharged with a ocoper cath to follow up with Urology. He returns today following referral to the ED by his Oncologist (Dr. Nguyen, [facility]) due to clinical findings of obstructive uropathy. It appears Pt was seen today in clinic for chemo and routine labs showed an elevated sCr to 4.35. An ultrasound was subsequently obtained which showed obstruction w/ bilateral hydronephrosis and bladder distension. Of note Cooper cath was d/c'd on 04/30 and re-inserted 5/1/20 due to significant  pain and vol retention per clinic note.        In the ED, VS remarkable for HR low 100s, RR low 20s. Further clinical eval was remarkable for labs w/CO2 17, BUN 74, sCr 4.06; WBC 11.2, Hgb 11.0. Cooper was in and Bladder scan in the ED showed >1L; with flush of cooper, ~ 1800cc of urine was drained. Case was discussed both with Urology and Oncology and the recommendation was for admission, hydration and recheck of labs.    Past Medical History    I have reviewed this patient's medical history and updated it with pertinent information if needed.   Past Medical History:   Diagnosis Date     Cancer (H)      Heavy alcohol use      Hypercholesteremia      Lower urinary tract symptoms (LUTS)        Past Surgical History   I have reviewed this patient's surgical history and updated it with pertinent information if needed.  Past Surgical History:   Procedure Laterality Date     APPENDECTOMY  1972     HERNIORRHAPHY INGUINAL INFANT         Prior to Admission Medications   Prior to Admission Medications   Prescriptions Last Dose Informant Patient Reported? Taking?   predniSONE (DELTASONE) 5 MG tablet   Yes No   senna-docusate (SENOKOT-S/PERICOLACE) 8.6-50 MG tablet   No No   Sig: Take 2 tablets by mouth 2 times daily      Facility-Administered Medications: None     Allergies   No Known Allergies    Social History   I have reviewed this patient's social history and updated it with pertinent information if needed. Andre Serrano   reports that he is a non-smoker but has been exposed to tobacco smoke. He has never used smokeless tobacco. He reports current alcohol use of about 16.7 standard drinks of alcohol per week. He reports current drug use. Drug: Marijuana.    Family History   I have reviewed this patient's family history and updated it with pertinent information if needed.   Family History   Problem Relation Age of Onset     Prostate Cancer Father      Brain Cancer Father      Uterine Cancer Mother      Coronary Artery Disease Maternal Grandfather      Diabetes No family hx of        Review of Systems   The 10 point Review of Systems is negative other than noted in the HPI or here.     Physical Exam   Temp: 97.6  F (36.4  C) Temp src: Oral BP: 132/82 Pulse: 101   Resp: 20 SpO2: 97 % O2 Device: None (Room air)    Vital Signs with Ranges  Temp:  [97.6  F (36.4  C)] 97.6  F (36.4  C)  Pulse:  [101] 101  Resp:  [20] 20  BP: (132)/(82) 132/82  SpO2:  [97 %] 97 %  227 lbs 0 oz    Constitutional: Awake, alert, cooperative, no apparent distress.  Eyes: Conjunctiva and EOM examined and normal.  HEENT: Moist mucous membranes, normal dentition.  Respiratory: Clear to auscultation bilaterally, no crackles or wheezing.  Cardiovascular: Regular rate and rhythm, normal S1 and S2, and no murmur noted.  GI: Soft, non-distended, non-tender, normal bowel sounds.  Lymph/Hematologic: No palpable adenopathy or unusual bleeding.  Skin: No rashes, no cyanosis, no edema.  Musculoskeletal: No joint swelling, erythema or tenderness. No CVA tenderness.  Neurologic: AOx4, no gross focal deficits.  Psychiatric: Appropriate and cooperative, no obvious anxiety or depression.    Data   Data reviewed today:  I personally reviewed no images or EKG's today.  Recent Labs   Lab 05/05/20  1536   WBC 11.2*   HGB 11.0*   MCV 95         POTASSIUM 4.4   CHLORIDE 106   CO2 17*   BUN 74*   CR 4.06*   ANIONGAP 11   JAXSON 7.1*   *   ALBUMIN 3.0*   PROTTOTAL 7.2    BILITOTAL 0.5   ALKPHOS 105   ALT 25   AST 13       No results found for this or any previous visit (from the past 24 hour(s)).

## 2020-05-06 PROBLEM — N17.9 AKI (ACUTE KIDNEY INJURY) (H): Status: ACTIVE | Noted: 2020-05-06

## 2020-05-06 LAB
ALBUMIN SERPL-MCNC: 2.7 G/DL (ref 3.4–5)
ALBUMIN UR-MCNC: 30 MG/DL
ANION GAP SERPL CALCULATED.3IONS-SCNC: 6 MMOL/L (ref 3–14)
ANION GAP SERPL CALCULATED.3IONS-SCNC: 7 MMOL/L (ref 3–14)
APPEARANCE UR: ABNORMAL
BACTERIA #/AREA URNS HPF: ABNORMAL /HPF
BILIRUB UR QL STRIP: NEGATIVE
BUN SERPL-MCNC: 44 MG/DL (ref 7–30)
BUN SERPL-MCNC: 50 MG/DL (ref 7–30)
CALCIUM SERPL-MCNC: 7.3 MG/DL (ref 8.5–10.1)
CALCIUM SERPL-MCNC: 7.8 MG/DL (ref 8.5–10.1)
CHLORIDE SERPL-SCNC: 109 MMOL/L (ref 94–109)
CHLORIDE SERPL-SCNC: 109 MMOL/L (ref 94–109)
CO2 SERPL-SCNC: 20 MMOL/L (ref 20–32)
CO2 SERPL-SCNC: 21 MMOL/L (ref 20–32)
COLOR UR AUTO: ABNORMAL
CREAT SERPL-MCNC: 1.82 MG/DL (ref 0.66–1.25)
CREAT SERPL-MCNC: 2.12 MG/DL (ref 0.66–1.25)
ERYTHROCYTE [DISTWIDTH] IN BLOOD BY AUTOMATED COUNT: 12.5 % (ref 10–15)
GFR SERPL CREATININE-BSD FRML MDRD: 32 ML/MIN/{1.73_M2}
GFR SERPL CREATININE-BSD FRML MDRD: 38 ML/MIN/{1.73_M2}
GLUCOSE SERPL-MCNC: 109 MG/DL (ref 70–99)
GLUCOSE SERPL-MCNC: 96 MG/DL (ref 70–99)
GLUCOSE UR STRIP-MCNC: NEGATIVE MG/DL
HCT VFR BLD AUTO: 30.5 % (ref 40–53)
HGB BLD-MCNC: 10.5 G/DL (ref 13.3–17.7)
HGB UR QL STRIP: ABNORMAL
KETONES UR STRIP-MCNC: NEGATIVE MG/DL
LEUKOCYTE ESTERASE UR QL STRIP: ABNORMAL
MCH RBC QN AUTO: 32.6 PG (ref 26.5–33)
MCHC RBC AUTO-ENTMCNC: 34.4 G/DL (ref 31.5–36.5)
MCV RBC AUTO: 95 FL (ref 78–100)
MUCOUS THREADS #/AREA URNS LPF: PRESENT /LPF
NITRATE UR QL: NEGATIVE
PH UR STRIP: 5.5 PH (ref 5–7)
PHOSPHATE SERPL-MCNC: 2.7 MG/DL (ref 2.5–4.5)
PLATELET # BLD AUTO: 345 10E9/L (ref 150–450)
POTASSIUM SERPL-SCNC: 3.7 MMOL/L (ref 3.4–5.3)
POTASSIUM SERPL-SCNC: 3.9 MMOL/L (ref 3.4–5.3)
RBC # BLD AUTO: 3.22 10E12/L (ref 4.4–5.9)
RBC #/AREA URNS AUTO: 8 /HPF (ref 0–2)
SARS-COV-2 PCR COMMENT: NORMAL
SARS-COV-2 RNA SPEC QL NAA+PROBE: NEGATIVE
SARS-COV-2 RNA SPEC QL NAA+PROBE: NORMAL
SODIUM SERPL-SCNC: 135 MMOL/L (ref 133–144)
SODIUM SERPL-SCNC: 137 MMOL/L (ref 133–144)
SOURCE: ABNORMAL
SP GR UR STRIP: 1.01 (ref 1–1.03)
SPECIMEN SOURCE: NORMAL
SPECIMEN SOURCE: NORMAL
SQUAMOUS #/AREA URNS AUTO: <1 /HPF (ref 0–1)
UROBILINOGEN UR STRIP-MCNC: NORMAL MG/DL (ref 0–2)
WBC # BLD AUTO: 11.1 10E9/L (ref 4–11)
WBC #/AREA URNS AUTO: 11 /HPF (ref 0–5)

## 2020-05-06 PROCEDURE — 96374 THER/PROPH/DIAG INJ IV PUSH: CPT

## 2020-05-06 PROCEDURE — 81001 URINALYSIS AUTO W/SCOPE: CPT | Performed by: HOSPITALIST

## 2020-05-06 PROCEDURE — 85027 COMPLETE CBC AUTOMATED: CPT | Performed by: INTERNAL MEDICINE

## 2020-05-06 PROCEDURE — 80069 RENAL FUNCTION PANEL: CPT | Performed by: INTERNAL MEDICINE

## 2020-05-06 PROCEDURE — 99232 SBSQ HOSP IP/OBS MODERATE 35: CPT | Performed by: HOSPITALIST

## 2020-05-06 PROCEDURE — 25000131 ZZH RX MED GY IP 250 OP 636 PS 637: Performed by: INTERNAL MEDICINE

## 2020-05-06 PROCEDURE — 80048 BASIC METABOLIC PNL TOTAL CA: CPT | Performed by: HOSPITALIST

## 2020-05-06 PROCEDURE — 25000128 H RX IP 250 OP 636: Performed by: HOSPITALIST

## 2020-05-06 PROCEDURE — 25000132 ZZH RX MED GY IP 250 OP 250 PS 637: Performed by: INTERNAL MEDICINE

## 2020-05-06 PROCEDURE — 36415 COLL VENOUS BLD VENIPUNCTURE: CPT | Performed by: HOSPITALIST

## 2020-05-06 PROCEDURE — G0378 HOSPITAL OBSERVATION PER HR: HCPCS

## 2020-05-06 PROCEDURE — 25800030 ZZH RX IP 258 OP 636: Performed by: INTERNAL MEDICINE

## 2020-05-06 PROCEDURE — 87635 SARS-COV-2 COVID-19 AMP PRB: CPT | Performed by: HOSPITALIST

## 2020-05-06 PROCEDURE — 87186 SC STD MICRODIL/AGAR DIL: CPT | Performed by: INTERNAL MEDICINE

## 2020-05-06 PROCEDURE — 36415 COLL VENOUS BLD VENIPUNCTURE: CPT | Performed by: INTERNAL MEDICINE

## 2020-05-06 PROCEDURE — 87086 URINE CULTURE/COLONY COUNT: CPT | Performed by: INTERNAL MEDICINE

## 2020-05-06 PROCEDURE — 87088 URINE BACTERIA CULTURE: CPT | Performed by: INTERNAL MEDICINE

## 2020-05-06 PROCEDURE — 12000000 ZZH R&B MED SURG/OB

## 2020-05-06 RX ORDER — AMOXICILLIN 250 MG
1 CAPSULE ORAL 2 TIMES DAILY
Status: DISCONTINUED | OUTPATIENT
Start: 2020-05-06 | End: 2020-05-08 | Stop reason: HOSPADM

## 2020-05-06 RX ORDER — CEFTRIAXONE 1 G/1
1 INJECTION, POWDER, FOR SOLUTION INTRAMUSCULAR; INTRAVENOUS EVERY 24 HOURS
Status: DISCONTINUED | OUTPATIENT
Start: 2020-05-06 | End: 2020-05-08

## 2020-05-06 RX ADMIN — PREDNISONE 5 MG: 5 TABLET ORAL at 08:16

## 2020-05-06 RX ADMIN — SODIUM CHLORIDE: 9 INJECTION, SOLUTION INTRAVENOUS at 12:05

## 2020-05-06 RX ADMIN — ACETAMINOPHEN 650 MG: 325 TABLET, FILM COATED ORAL at 22:43

## 2020-05-06 RX ADMIN — CEFTRIAXONE 1 G: 1 INJECTION, POWDER, FOR SOLUTION INTRAMUSCULAR; INTRAVENOUS at 14:53

## 2020-05-06 RX ADMIN — SODIUM CHLORIDE: 9 INJECTION, SOLUTION INTRAVENOUS at 02:32

## 2020-05-06 RX ADMIN — OYSTER SHELL CALCIUM WITH VITAMIN D 2 TABLET: 500; 200 TABLET, FILM COATED ORAL at 08:16

## 2020-05-06 ASSESSMENT — ACTIVITIES OF DAILY LIVING (ADL)
ADLS_ACUITY_SCORE: 11
TRANSFERRING: 0-->INDEPENDENT
RETIRED_COMMUNICATION: 0-->UNDERSTANDS/COMMUNICATES WITHOUT DIFFICULTY
AMBULATION: 0-->INDEPENDENT
TOILETING: 0-->INDEPENDENT
ADLS_ACUITY_SCORE: 11
DRESS: 0-->INDEPENDENT
BATHING: 0-->INDEPENDENT
COGNITION: 0 - NO COGNITION ISSUES REPORTED
SWALLOWING: 0-->SWALLOWS FOODS/LIQUIDS WITHOUT DIFFICULTY
FALL_HISTORY_WITHIN_LAST_SIX_MONTHS: NO
RETIRED_EATING: 0-->INDEPENDENT

## 2020-05-06 NOTE — PROGRESS NOTES
[facility]    Medicine Progress Note - Hospitalist Service       Date of Admission:  5/5/2020  Assessment & Plan      Acute kidney injury  sCr to 4.06 (BUN 74) on presentation; Pt seen by his oncologist on day of presentation where sCr was elevated to 4.35 and US showed obstruction w/ bilateral hydronephrosis and bladder distension. With cooper flush >1L of urine drained. Likely post-renal due to renal obstruction.  -Continue NS at 100cc/hr   - Monitor I/O  - Dose all renally metabolized meds for GFR < 10  - Avoid nephrotoxins if possible  - Monitor lytes Ca, Mg, Phos  - recheck bmp     Leucocytosis, fever  WBC to 11.2 on presentation; likely stress de-margination versus UTI  UA is equivocal for infection  - because of pandemic, active chemotherapy implying possible immunesuppression, would rule out COVID but suspicion is LOW  - continue ceftriaxone     Electrolyte derangement  CO2 17; likely due to ANITA.  - Treatment as above   - Monitor      Prostate CA with mets  On abiraterone and prednisone PTA.  - Continue PTA meds  - F/u with Oncologist     Diet: Regular Diet Adult    DVT Prophylaxis: Pneumatic Compression Devices  Cooper Catheter: in place, indication: Retention  Code Status: Full Code      Disposition Plan   Expected discharge: Tomorrow, recommended to prior living arrangement once renal function stable, COVID ruled out.  Entered: Luis Carlos Russell DO 05/06/2020, 2:02 PM       The patient's care was discussed with the Patient.    Luis Carlos Russell DO  Hospitalist Service  [facility]    ______________________________________________________________________    Interval History   Feeling better, but did spike a fever. No abdominal complaints, no shortness of breath or cough. Denies any COVID exposure    Data reviewed today: I reviewed all medications, new labs and imaging results over the last 24 hours. I personally reviewed no images or EKG's today.    Physical Exam    Vital Signs: Temp: 98.3  F (36.8  C) Temp src: Oral BP: 108/62 Pulse: 105   Resp: 16 SpO2: 95 % O2 Device: None (Room air)    Weight: 227 lbs 0 oz  Constitutional: Awake, alert, cooperative, no apparent distress.  Respiratory: Clear to auscultation bilaterally, no crackles or wheezing.  Cardiovascular: Regular rate and rhythm, normal S1 and S2, and no murmur noted.  GI: Soft, non-distended, non-tender, normal bowel sounds.  Lymph/Hematologic: No palpable adenopathy or unusual bleeding.  Skin: No rashes, no cyanosis, no edema.  Musculoskeletal: No joint swelling, erythema or tenderness. No CVA tenderness.  Neurologic: AOx4, no gross focal deficits.  Psychiatric: Appropriate and cooperative, no obvious anxiety or depression.       Data   Recent Labs   Lab 05/06/20  0645 05/05/20  1536   WBC 11.1* 11.2*   HGB 10.5* 11.0*   MCV 95 95    365    134   POTASSIUM 3.7 4.4   CHLORIDE 109 106   CO2 20 17*   BUN 50* 74*   CR 2.12* 4.06*   ANIONGAP 6 11   JAXSON 7.3* 7.1*   GLC 96 111*   ALBUMIN 2.7* 3.0*   PROTTOTAL  --  7.2   BILITOTAL  --  0.5   ALKPHOS  --  105   ALT  --  25   AST  --  13     No results found for this or any previous visit (from the past 24 hour(s)).  Medications     sodium chloride 100 mL/hr at 05/06/20 1205       sodium chloride 0.9%  1,000 mL Intravenous Once     abiraterone  1,000 mg Oral Daily     calcium carbonate-vitamin D  2 tablet Oral Daily     cefTRIAXone  1 g Intravenous Q24H     predniSONE  5 mg Oral Daily     senna-docusate  1 tablet Oral BID

## 2020-05-06 NOTE — CONSULTS
[facility] Inpatient Consultation Note    Andre Serrano MRN# [id number]   Age: 64 year old YOB: 1955     Date of Admission:  5/5/2020    Reason for consult: OBSTRUCTIVE UROPATHY       Requesting physician: DR. MAY                History of Present Illness:   64 year old with metastatic prostate cancer diagnosed in March 2020 who follows with my partner Dr. Paris and medical oncology.  He is on Firmagon currently and his Cooper was removed in clinic on 4/30/20, but then replaced on 5/1/20 as he developed recurrent retention.  He is currently admitted for ANITA.  Outside ultrasound showed distended bladder and bilateral hydronephrosis.  In the ED on 5/5, his Cooper was obstructed and after flushing this, over 1800 ml urine was drained.   Creatinine has improved from 4.06 to 2.12 since admission (baseline about 1.4-1.6).  Over 7 liters urine output recorded since admission yesterday.       FROM ADMISSION NOTE: Andre Serrano is a 64 year old male with who presents with recent diagnosis of metastatic prostate cancer on chemotherapy, he was recently admitted and treated at [facility] 3/20 to 3/23/20 for obstructive uropathy and discharged with a cooper cath to follow up with Urology. He returns today following referral to the ED by his Oncologist (Dr. Nguyen, [facility]) due to clinical findings of obstructive uropathy. It appears Pt was seen today in clinic for chemo and routine labs showed an elevated sCr to 4.35. An ultrasound was subsequently obtained which showed obstruction w/ bilateral hydronephrosis and bladder distension. Of note Cooper cath was d/c'd on 04/30 and re-inserted 5/1/20 due to significant  pain and vol retention per clinic note.         In the ED, VS remarkable for HR low 100s, RR low 20s. Further clinical eval was remarkable for labs w/CO2 17, BUN 74, sCr 4.06; WBC 11.2, Hgb 11.0. Cooper was in and Bladder scan in the ED showed >1L; with flush of cooper,  ~ 1800cc of urine was drained. Case was discussed both with Urology and Oncology and the recommendation was for admission, hydration and recheck of labs.         Past Medical History:     Past Medical History:   Diagnosis Date     Cancer (H)      Heavy alcohol use      Hypercholesteremia      Lower urinary tract symptoms (LUTS)              Past Surgical History:     Past Surgical History:   Procedure Laterality Date     APPENDECTOMY  1972     HERNIORRHAPHY INGUINAL INFANT               Social History:     Social History     Socioeconomic History     Marital status: Single     Spouse name: Not on file     Number of children: Not on file     Years of education: Not on file     Highest education level: Not on file   Occupational History     Occupation: retired     Employer: [employer]     Comment: Juvenile    Social Needs     Financial resource strain: Not on file     Food insecurity     Worry: Not on file     Inability: Not on file     Transportation needs     Medical: Not on file     Non-medical: Not on file   Tobacco Use     Smoking status: Passive Smoke Exposure - Never Smoker     Smokeless tobacco: Never Used     Tobacco comment: occasion   Substance and Sexual Activity     Alcohol use: Yes     Alcohol/week: 16.7 standard drinks     Types: 20 Cans of beer per week     Drug use: Yes     Types: Marijuana     Sexual activity: Yes   Lifestyle     Physical activity     Days per week: Not on file     Minutes per session: Not on file     Stress: Not on file   Relationships     Social connections     Talks on phone: Not on file     Gets together: Not on file     Attends Nondenominational service: Not on file     Active member of club or organization: Not on file     Attends meetings of clubs or organizations: Not on file     Relationship status: Not on file     Intimate partner violence     Fear of current or ex partner: Not on file     Emotionally abused: Not on file     Physically abused: Not on file      "Forced sexual activity: Not on file   Other Topics Concern     Not on file   Social History Narrative     Not on file             Family History:     Family History   Problem Relation Age of Onset     Prostate Cancer Father      Brain Cancer Father      Uterine Cancer Mother      Coronary Artery Disease Maternal Grandfather      Diabetes No family hx of              Immunizations:     Immunization History   Administered Date(s) Administered     Influenza Vaccine Im 4yrs+ 4 Valent CCIIV4 09/10/2019     Pneumococcal 23 valent 06/25/2015     TDAP Vaccine (Boostrix) 06/25/2015             Allergies:   No Known Allergies          Medications:     Current Facility-Administered Medications   Medication     0.9% sodium chloride BOLUS     abiraterone (ZYTIGA) tablet 1,000 mg     acetaminophen (TYLENOL) Suppository 650 mg     acetaminophen (TYLENOL) tablet 650 mg     calcium carbonate-vitamin D (OSCAL w/D) per tablet 2 tablet     melatonin tablet 1 mg     naloxone (NARCAN) injection 0.1-0.4 mg     ondansetron (ZOFRAN-ODT) ODT tab 4 mg    Or     ondansetron (ZOFRAN) injection 4 mg     predniSONE (DELTASONE) tablet 5 mg     senna-docusate (SENOKOT-S/PERICOLACE) 8.6-50 MG per tablet 1 tablet     sodium chloride 0.9% infusion             Review of Systems:   Comprehensive review of systems from the Admission note dated 5/5/20 at [facility] was reviewed with no changes except per HPI.     Examination:  /77   Pulse 98   Temp 99.9  F (37.7  C) (Oral)   Resp 16   Ht 1.727 m (5' 8\")   Wt 103 kg (227 lb)   SpO2 96%   BMI 34.52 kg/m    General: Alert and oriented, no distress  HEENT: Face symmetric, mucous membranes moist and pink  Eyes: No scleral icterus  Neck: Symmetric  Chest wall: Symmetric  Respiratory: Breathing unlabored, no audible wheezing  Cardiac: Extremities warm and well perfused, no edema  : Pride in place draining clear urine  Extremities: No evidence of deformities or " trauma  Neuro:Grossly non focal  Pysch: Normal mood and affect  Skin: No evident rashes or lesions            Data:     Lab Results   Component Value Date    WBC 11.1 05/06/2020     Lab Results   Component Value Date    RBC 3.22 05/06/2020     Lab Results   Component Value Date    HGB 10.5 05/06/2020     Lab Results   Component Value Date    HCT 30.5 05/06/2020     Lab Results   Component Value Date     05/06/2020     Creatinine   Date Value Ref Range Status   05/06/2020 2.12 (H) 0.66 - 1.25 mg/dL Final   ]  Lab Results   Component Value Date    BUN 50 05/06/2020       Imaging:  CT ABDOMEN PELVIS W/O CONTRAST 3/20/2020 4:56 PM     CLINICAL HISTORY: left flank pain with nausea and vomiting  TECHNIQUE: CT scan of the abdomen and pelvis was performed without IV  contrast. Multiplanar reformats were obtained. Dose reduction  techniques were used.  CONTRAST: None.     COMPARISON: None.     FINDINGS:   LOWER CHEST: Coronary artery calcifications are noted. There are  micronodules in the visualized lung bases     HEPATOBILIARY: Unenhanced contours unremarkable. Gallbladder  unremarkable.     PANCREAS: Unremarkable.     SPLEEN: Spleen is normal in size.     ADRENAL GLANDS: Unenhanced contours are unremarkable.     KIDNEYS/BLADDER: Moderate bilateral hydroureteronephrosis with a  hydroureter extending to the distal aspects without evidence of  urolithiasis. There is a moderate asymmetric left periureteral and  perinephric stranding without loculated fluid collection. Moderate  distention of the urinary bladder. There is mild nodular enlargement  of the prostate that indents the base of the urinary bladder with  irregular layering hyperdensity in the dependent urinary bladder that  is inseparable from the nodular protrusion of the prostate as seen on  series 4/image 184.     BOWEL: No bowel obstruction. Mild distal colonic diverticulosis  without acute diverticulitis.     LYMPH NODES: Diffuse retroperitoneal and  bilateral iliac chain  lymphadenopathy. Left paraortic lymph node at the level of the left  kidney measures 2.1 x 2.8 cm series 4/image 89, left external iliac  lymph node measures 1.9 x 3.1 cm series 4/image 153. There are  multiple lymph nodes adjacent to the inferior mesenteric vasculature  measuring 1.4 x 1.6 cm series 4/image 110. There are numerous  bilateral internal iliac lymphadenopathy and tubular nodularity in the  mesorectal fascia.     VASCULATURE: Abdominal aorta normal in caliber with mild calcified  atheromatous plaque.     PELVIC ORGANS: Nodular enlargement of the prostate indenting the base  of the urinary bladder.     OTHER: Mild presacral edema. No pneumoperitoneum.     MUSCULOSKELETAL: There are multiple faint sclerotic lesions in the  visualized spine and pelvis, most prominent in the subtrochanteric  right proximal femur, intertrochanteric left proximal femur,  subchondral left femoral head.                                                                      IMPRESSION:   1.  Moderate bilateral hydroureteronephrosis extending to the level of  the distal ureters without focal transition point. There is moderate  urinary bladder retention and the hydronephrosis may be due to urinary  bladder outflow compromise/obstruction from the prostate (high  suspicion for prostate malignancy).  2.  Asymmetric left nephric stranding and edema is concerning for  superimposed ascending urinary tract infection.  3.  Retroperitoneal and iliac lymphadenopathy consistent with doc  metastatic disease and is favored to be from a primary prostate  malignancy. Additional differential considerations include rectal  malignancy, lymphoma, urothelial malignancy given the irregular  thickening in the posterior urinary bladder wall that is inseparable  from the prostate. Note that multiple lymph nodes are adjacent to the  ureters, however there is no high-grade ureteral caliber change to  suggest that the lymphadenopathy  is a source of the obstruction.  4.  Nodularity in the mesial rectal fascia and lymphadenopathy along  the inferior mesenteric vasculature, is concerning for a component of  extramural vascular invasion.  5.  Sclerotic osseous metastatic disease.  6.  A few micronodules in the visualized lung bases, pulmonary  metastatic disease is not excluded.     MALCOLM ALANIS MD    Impression:  Metastatic prostate cancer  Obstructive uropathy with bilateral hydronephrosis d/t prostate cancer  ANITA, improving with Pride catheter drainage    Plan:  Continue Pride catheter on discharge -- he will likely need this long term due to advanced prostate cancer and recently failed voiding trial.    He is scheduled to see Dr. Paris on June 4 for Firmagon injection and PSA; his Pride can be exchanged at that time as well as his future monthly clinic appts.      Tong Jacobson MD  [facility] ([facility])  [phone]

## 2020-05-06 NOTE — PROGRESS NOTES
"Pt is AXOX4. VSS. Denies pain. Refused to take Chemo med; stated \"my oncologist said I shouldn't take it until my creatinine level is ok\". MD aware. Will continue to monitor.  "

## 2020-05-06 NOTE — PLAN OF CARE
Alert and oriented x 4. Up with SBA. Denies pain. IVFs infusing. Pride with large output. Regular diet. VSS, on room air.

## 2020-05-06 NOTE — PROGRESS NOTES
Observation goals  PRIOR TO DISCHARGE        Comments:   - Improved sCr and consistent urine output: NOT MET     Nurse to notify provider when observation goals have been met and patient is ready for discharge.

## 2020-05-06 NOTE — PLAN OF CARE
Pt A&Ox4, VSS on RA. Pt up with SBA, voiding large amounts via Pride. Pt reports that high Pride output is consistent with his home Pride output, which he emptied approximately 6 times per day. Regular diet. IV infusing NS at 100 mL/hr. Denied pain this shift. Will continue to follow plan of care.

## 2020-05-06 NOTE — PROVIDER NOTIFICATION
MD Notification    Notified Person: MD    Notified Person Name: Dr. Russell    Notification Date/Time: 5/6/2020 1700    Notification Interaction: Paged/talked in person    Purpose of Notification: COVID-19 negative. Can we discontinue precautions?    Orders Received: OK to discontinue COVID precautions.     Comments:  
MD notified regarding pt refusal of chemo med.  
Patient

## 2020-05-07 LAB
ANION GAP SERPL CALCULATED.3IONS-SCNC: 6 MMOL/L (ref 3–14)
BUN SERPL-MCNC: 32 MG/DL (ref 7–30)
CALCIUM SERPL-MCNC: 7.7 MG/DL (ref 8.5–10.1)
CHLORIDE SERPL-SCNC: 109 MMOL/L (ref 94–109)
CO2 SERPL-SCNC: 23 MMOL/L (ref 20–32)
CREAT SERPL-MCNC: 1.53 MG/DL (ref 0.66–1.25)
GFR SERPL CREATININE-BSD FRML MDRD: 47 ML/MIN/{1.73_M2}
GLUCOSE SERPL-MCNC: 158 MG/DL (ref 70–99)
POTASSIUM SERPL-SCNC: 3.8 MMOL/L (ref 3.4–5.3)
PROCALCITONIN SERPL-MCNC: 0.21 NG/ML
SODIUM SERPL-SCNC: 138 MMOL/L (ref 133–144)

## 2020-05-07 PROCEDURE — 99232 SBSQ HOSP IP/OBS MODERATE 35: CPT | Performed by: HOSPITALIST

## 2020-05-07 PROCEDURE — 84145 PROCALCITONIN (PCT): CPT | Performed by: HOSPITALIST

## 2020-05-07 PROCEDURE — 25000128 H RX IP 250 OP 636: Performed by: HOSPITALIST

## 2020-05-07 PROCEDURE — 36415 COLL VENOUS BLD VENIPUNCTURE: CPT | Performed by: HOSPITALIST

## 2020-05-07 PROCEDURE — 80048 BASIC METABOLIC PNL TOTAL CA: CPT | Performed by: HOSPITALIST

## 2020-05-07 PROCEDURE — 25000131 ZZH RX MED GY IP 250 OP 636 PS 637: Performed by: INTERNAL MEDICINE

## 2020-05-07 PROCEDURE — 25000132 ZZH RX MED GY IP 250 OP 250 PS 637: Performed by: HOSPITALIST

## 2020-05-07 PROCEDURE — 25800030 ZZH RX IP 258 OP 636: Performed by: HOSPITALIST

## 2020-05-07 PROCEDURE — 12000000 ZZH R&B MED SURG/OB

## 2020-05-07 PROCEDURE — 25000132 ZZH RX MED GY IP 250 OP 250 PS 637: Performed by: INTERNAL MEDICINE

## 2020-05-07 RX ADMIN — PREDNISONE 5 MG: 5 TABLET ORAL at 09:05

## 2020-05-07 RX ADMIN — OYSTER SHELL CALCIUM WITH VITAMIN D 2 TABLET: 500; 200 TABLET, FILM COATED ORAL at 09:05

## 2020-05-07 RX ADMIN — SODIUM CHLORIDE: 9 INJECTION, SOLUTION INTRAVENOUS at 22:49

## 2020-05-07 RX ADMIN — SODIUM CHLORIDE: 9 INJECTION, SOLUTION INTRAVENOUS at 03:46

## 2020-05-07 RX ADMIN — SENNOSIDES AND DOCUSATE SODIUM 1 TABLET: 8.6; 5 TABLET ORAL at 22:49

## 2020-05-07 RX ADMIN — CEFTRIAXONE 1 G: 1 INJECTION, POWDER, FOR SOLUTION INTRAMUSCULAR; INTRAVENOUS at 12:58

## 2020-05-07 ASSESSMENT — ACTIVITIES OF DAILY LIVING (ADL)
ADLS_ACUITY_SCORE: 11

## 2020-05-07 NOTE — PROGRESS NOTES
"Pt AXOX4. VSS. Ambulating independently  w/SBA in the room. Pride draining with high output. Discharge pending till tomorrow per provider note. \"24hrs of watching culture results\" Creatinine improving.  Denies pain. Will continue to monitor.  "

## 2020-05-07 NOTE — PLAN OF CARE
Patient is A+OX4. VSS on RA. Up independently. Bigg for retention with high output. COVID-19 test negative, sent back to station 55 at 1845. Plan for possible discharge tomorrow if creat remains stable.

## 2020-05-07 NOTE — PROGRESS NOTES
MD Notification    Notified Person: MD    Notified Person Name: Machelle Joshi MD    Notification Date/Time: 5/6/20 8867    Notification Interaction: Pager    Purpose of Notification: Temp 100.9     Orders Received: Awaiting    Comments:Tylenol given

## 2020-05-07 NOTE — CONSULTS
Consultation    Andre Serrano MRN# [id number]   YOB: 1955 Age: 64 year old   Date of Admission: 5/5/2020  Requesting physician: Dr. Ochoa  Reason for consult: Metastatic prostate cancer, chemotherapy questions           Assessment and Plan:     Negrito is a 64 year old admitted 5/5 with acute kidney injury    Metastatic prostate cancer  - Followed by Dr. Nguyen  - Has started abiraterone and prednisone; this can resume as an outpatient when he returns home. There are not dose attenuations recommended for renal dysfunction.  - Plan follow up in 1 week in the clinic with repeat creatinine. He will be due for monthly zoledronic acid on that day if renal function is stable.    ANITA  - Likely obstructive related  - Improving daily  - Pride will remain in place  - Avoid nephrotoxins    Fever  - COVID negative  - UA is abnormal and culture is pending  - On IV ceftriaxone    Phil AZAR, CNP  [facility]  [phone] (office); [phone] (cell)              Chief Complaint:   Abnormal Labs         History of Present Illness:     Negrito is a 64 year old admitted 5/5 with acute kidney injury    He was recently seen in Urology by Dr. Paris to have a discussion about Pride catheter use. He had the Pride removed about 1 week ago. He was able to void in clinic, but unable to continue that process at home. He was back in the Urology clinic on Friday and another Pride was inserted. He had good urine output, but was not feeling well through the weekend. His appetite was poor. He felt weak and constipation. Additionally, his back was hurting. This was all reminiscent of his initial presentation of obstructive uropathy. He was at [facility] for follow up on 5/5 and his creatinine was 4.7 mg/dL. He was given IV fluids. A renal ultrasound was done at [facility]. There was bilateral hydronephrosis with bladder distension. Negrito was sent to the ED.     In the ED, another Pride was placed with  significant output. He was admitted for ongoing evaluation. Since being hospitalized he had developed a low grade fever. His UA is abnormal. Antibiotics were started and culture is pending.     His abiraterone, which he recently started, has been on hold. The question is when to resume.    ONCOLOGY HISTORY:  Mr. Andre Serrano is a 64-year-old pleasant man with a history of elevated PSA in 09/2019. He had multiple urinary symptoms including hesitancy, frequency and decrease in urine flow for a while, but worsened significantly over few days. He was supposed to have a followup September, but lost to followup and he came to the outpatient office with increasing abdominal pain and left flank pain. He had constipation a few days, but he was not eating much and was found to have creatinine of 9 and was admitted to the hospital on 03/20/2020. A Pride catheter was placed and felt better. CT scan of abdomen without contrast was done and it showed moderate bilateral hydroureteronephrosis with the hydroureter extending to the distal aspect without evidence of urolithiasis. There is moderate asymmetric left  periureteral and perinepliric stranding. Mild nodular enlargement of the prostate and dense in the base of the urinary bladder with regular layering hyperdensity, inseparable from the nodular protrusion of the prostate. There is lymphadenopathy, diffuse retroperitoneal and bilateral iliac chain, left periaortic lymph nodes at the level of the left kidney 2.1 x 2.8 cm. Left external iliac lymph node 1.9 x 3.1 cm. Multiple lymph nodes adjacent to the inferior mesenteric vasculature measuring 1.4 x 1.6 cm. Numerous bilateral internal iliac lymphadenopathy. Multiple faint sclerotic lesions in the visualized spine and pelvis, most prominent in the subtrochanteric right  proximal femur, intertrochanteric left proximal femur consistent with metastatic disease.    Biopsy of the pelvic lymph node showed adenocarcinoma consistent  "with prostate cancer.     Bone scan was done on  which showed widespread metastatic disease including humeri femurs sacrum and ribs in addition to majority of the spine.    He was given first LHRH analog at urology office on 2020. Abiraterone and prednisone regimen has been started.    Zoledronic acid started 2020.         Physical Exam:   Vitals were reviewed  Blood pressure 111/65, pulse 85, temperature 98.8  F (37.1  C), temperature source Oral, resp. rate 16, height 1.727 m (5' 8\"), weight 103 kg (227 lb), SpO2 96 %.  Temperatures:  Current - Temp: 98.8  F (37.1  C); Max - Temp  Av.1  F (37.3  C)  Min: 97.9  F (36.6  C)  Max: 100.9  F (38.3  C)  Respiration range: Resp  Av  Min: 16  Max: 16  Pulse range: Pulse  Av.5  Min: 63  Max: 106  Blood pressure range: Systolic (24hrs), Av , Min:96 , Max:115   ; Diastolic (24hrs), Av, Min:59, Max:76    Pulse oximetry range: SpO2  Av.2 %  Min: 94 %  Max: 99 %    Intake/Output Summary (Last 24 hours) at 2020 1222  Last data filed at 2020 1004  Gross per 24 hour   Intake 724 ml   Output 5450 ml   Net -4726 ml       GENERAL: No acute distress.  No formal exam.              Past Medical History:   I have reviewed this patient's past medical history  Past Medical History:   Diagnosis Date     Cancer (H)      Heavy alcohol use      Hypercholesteremia      Lower urinary tract symptoms (LUTS)              Past Surgical History:   I have reviewed this patient's past surgical history  Past Surgical History:   Procedure Laterality Date     APPENDECTOMY  1972     HERNIORRHAPHY INGUINAL INFANT                 Social History:   I have reviewed this patient's social history  Social History     Tobacco Use     Smoking status: Passive Smoke Exposure - Never Smoker     Smokeless tobacco: Never Used     Tobacco comment: occasion   Substance Use Topics     Alcohol use: Yes     Alcohol/week: 16.7 standard drinks     Types: 20 Cans of " beer per week             Family History:   I have reviewed this patient's family history  Family History   Problem Relation Age of Onset     Prostate Cancer Father      Brain Cancer Father      Uterine Cancer Mother      Coronary Artery Disease Maternal Grandfather      Diabetes No family hx of              Allergies:   No Known Allergies          Medications:   I have reviewed this patient's current medications  Medications Prior to Admission   Medication Sig Dispense Refill Last Dose     abiraterone (ZYTIGA) 250 MG tablet Take 1,000 mg by mouth daily   5/5/2020 at Unknown time     CALCIUM-VITAMIN D PO Take 2 tablets by mouth daily        predniSONE (DELTASONE) 5 MG tablet Take 5 mg by mouth daily    5/5/2020 at Unknown time     senna-docusate (SENOKOT-S/PERICOLACE) 8.6-50 MG tablet Take 2 tablets by mouth 2 times daily 60 tablet 1 Past Month at Unknown time     Current Facility-Administered Medications Ordered in Epic   Medication Dose Route Frequency Last Rate Last Dose     0.9% sodium chloride BOLUS  1,000 mL Intravenous Once         abiraterone (ZYTIGA) tablet 1,000 mg  1,000 mg Oral Daily         acetaminophen (TYLENOL) Suppository 650 mg  650 mg Rectal Q4H PRN         acetaminophen (TYLENOL) tablet 650 mg  650 mg Oral Q4H PRN   650 mg at 05/06/20 2243     calcium carbonate-vitamin D (OSCAL w/D) per tablet 2 tablet  2 tablet Oral Daily   2 tablet at 05/07/20 0905     cefTRIAXone (ROCEPHIN) 1 g vial to attach to  mL bag for ADULTS or NS 50 mL bag for PEDS  1 g Intravenous Q24H   1 g at 05/06/20 1453     melatonin tablet 1 mg  1 mg Oral At Bedtime PRN         naloxone (NARCAN) injection 0.1-0.4 mg  0.1-0.4 mg Intravenous Q2 Min PRN         ondansetron (ZOFRAN-ODT) ODT tab 4 mg  4 mg Oral Q6H PRN        Or     ondansetron (ZOFRAN) injection 4 mg  4 mg Intravenous Q6H PRN         predniSONE (DELTASONE) tablet 5 mg  5 mg Oral Daily   5 mg at 05/07/20 0905     senna-docusate (SENOKOT-S/PERICOLACE) 8.6-50 MG  per tablet 1 tablet  1 tablet Oral BID         sodium chloride 0.9% infusion   Intravenous Continuous 50 mL/hr at 05/07/20 0346       No current Epic-ordered outpatient medications on file.             Review of Systems:     The 10 point Review of Systems is negative other than noted in the HPI.            Data:   Data   Results for orders placed or performed during the hospital encounter of 05/05/20 (from the past 24 hour(s))   COVID-19 Virus (Coronavirus) by PCR Nasopharyngeal    Specimen: Nasopharyngeal   Result Value Ref Range    COVID-19 Virus PCR to [facility] - Source Nasopharyngeal     COVID-19 Virus PCR to [facility] - Result       Test received-See reflex to IDDL test SARS CoV2 (COVID-19) Virus RT-PCR   SARS-CoV-2 COVID-19 Virus (Coronavirus) RT-PCR Nasopharyngeal    Specimen: Nasopharyngeal   Result Value Ref Range    SARS-CoV-2 Virus Specimen Source Nasopharyngeal     SARS-CoV-2 PCR Result NEGATIVE     SARS-CoV-2 PCR Comment       This automated, real-time RT-PCR assay by [employer] on the [employer] has   been given Emergency Use Authorization (EUA) for the in vitro qualitative detection of RNA   from the SARS-CoV2 virus in nasopharyngeal swabs in viral transport medium from patients   with signs and symptoms of infection who are suspected of COVID-19. The performance is   unknown in asymptomatic patients.     Basic metabolic panel   Result Value Ref Range    Sodium 137 133 - 144 mmol/L    Potassium 3.9 3.4 - 5.3 mmol/L    Chloride 109 94 - 109 mmol/L    Carbon Dioxide 21 20 - 32 mmol/L    Anion Gap 7 3 - 14 mmol/L    Glucose 109 (H) 70 - 99 mg/dL    Urea Nitrogen 44 (H) 7 - 30 mg/dL    Creatinine 1.82 (H) 0.66 - 1.25 mg/dL    GFR Estimate 38 (L) >60 mL/min/[1.73_m2]    GFR Estimate If Black 44 (L) >60 mL/min/[1.73_m2]    Calcium 7.8 (L) 8.5 - 10.1 mg/dL   Basic metabolic panel   Result Value Ref Range    Sodium 138 133 - 144 mmol/L    Potassium 3.8 3.4 - 5.3 mmol/L    Chloride 109 94 - 109  mmol/L    Carbon Dioxide 23 20 - 32 mmol/L    Anion Gap 6 3 - 14 mmol/L    Glucose 158 (H) 70 - 99 mg/dL    Urea Nitrogen 32 (H) 7 - 30 mg/dL    Creatinine 1.53 (H) 0.66 - 1.25 mg/dL    GFR Estimate 47 (L) >60 mL/min/[1.73_m2]    GFR Estimate If Black 54 (L) >60 mL/min/[1.73_m2]    Calcium 7.7 (L) 8.5 - 10.1 mg/dL   Procalcitonin   Result Value Ref Range    Procalcitonin 0.21 ng/ml

## 2020-05-07 NOTE — PLAN OF CARE
Pt A&O X4. VSS on RA, except BP soft and temp 100.9, tylenol given with relief. Up indep in room. Pride for retention per Urology. Denies pain. Plan to discharge home today pending labs. Continue to monitor.

## 2020-05-07 NOTE — PROGRESS NOTES
[facility]    Medicine Progress Note - Hospitalist Service       Date of Admission:  5/5/2020  Assessment & Plan      Acute kidney injury  sCr to 4.06 (BUN 74) on presentation; Pt seen by his oncologist on day of presentation where sCr was elevated to 4.35 and US showed obstruction w/ bilateral hydronephrosis and bladder distension. With cooper flush >1L of urine drained. Likely post-renal due to renal obstruction.  -Continue NS at 50 cc/hr   - Monitor I/O  - urology consult: continue cooper and follow up as outpatient     Fever possibly from urinary tract infection  WBC to 11.2 on presentation; likely stress de-margination versus UTI  UA is equivocal for infection  recurret fever overnight from 5/6-5/7  COVID ruled out  - continue ceftriaxone and follw culture  - obtain procalcitonin     Prostate CA with mets  On abiraterone and prednisone PTA.  - Continue PTA meds  - F/u with Oncologist     Diet: Regular Diet Adult    DVT Prophylaxis: Pneumatic Compression Devices  Cooper Catheter: in place, indication: Retention  Code Status: Full Code      Disposition Plan   Expected discharge: Tomorrow, recommended to prior living arrangement once another 24 hours of watching culture results.  Entered: Luis Carlos Russell DO 05/07/2020, 9:55 AM       The patient's care was discussed with the Patient.    Luis Carlos Russell DO  Hospitalist Service  [facility]    ______________________________________________________________________    Interval History   Feels fine now, but did have a fever last night. Cooper with good output, appetite improved. Discussed obstructive uropathy at length.    Data reviewed today: I reviewed all medications, new labs and imaging results over the last 24 hours. I personally reviewed no images or EKG's today.    Physical Exam   Vital Signs: Temp: 98.8  F (37.1  C) Temp src: Oral BP: 111/65 Pulse: 85   Resp: 16 SpO2: 96 % O2 Device: None (Room air)    Weight: 227 lbs 0  oz  Constitutional: Awake, alert, cooperative, no apparent distress.  Respiratory: Clear to auscultation bilaterally, no crackles or wheezing.  Cardiovascular: Regular rate and rhythm, normal S1 and S2, and no murmur noted.  GI: Soft, non-distended, non-tender, normal bowel sounds.  Lymph/Hematologic: No palpable adenopathy or unusual bleeding.  Skin: No rashes, no cyanosis, no edema.  Musculoskeletal: No joint swelling, erythema or tenderness. No CVA tenderness.  Neurologic: AOx4, no gross focal deficits.  Psychiatric: Appropriate and cooperative, no obvious anxiety or depression.       Data   Recent Labs   Lab 05/07/20  0642 05/06/20  1506 05/06/20  0645 05/05/20  1536   WBC  --   --  11.1* 11.2*   HGB  --   --  10.5* 11.0*   MCV  --   --  95 95   PLT  --   --  345 365    137 135 134   POTASSIUM 3.8 3.9 3.7 4.4   CHLORIDE 109 109 109 106   CO2 23 21 20 17*   BUN 32* 44* 50* 74*   CR 1.53* 1.82* 2.12* 4.06*   ANIONGAP 6 7 6 11   JAXSON 7.7* 7.8* 7.3* 7.1*   * 109* 96 111*   ALBUMIN  --   --  2.7* 3.0*   PROTTOTAL  --   --   --  7.2   BILITOTAL  --   --   --  0.5   ALKPHOS  --   --   --  105   ALT  --   --   --  25   AST  --   --   --  13     No results found for this or any previous visit (from the past 24 hour(s)).  Medications     sodium chloride 50 mL/hr at 05/07/20 0346       sodium chloride 0.9%  1,000 mL Intravenous Once     abiraterone  1,000 mg Oral Daily     calcium carbonate-vitamin D  2 tablet Oral Daily     cefTRIAXone  1 g Intravenous Q24H     predniSONE  5 mg Oral Daily     senna-docusate  1 tablet Oral BID

## 2020-05-08 VITALS
HEIGHT: 68 IN | WEIGHT: 227 LBS | HEART RATE: 60 BPM | OXYGEN SATURATION: 97 % | SYSTOLIC BLOOD PRESSURE: 120 MMHG | TEMPERATURE: 98.6 F | DIASTOLIC BLOOD PRESSURE: 80 MMHG | BODY MASS INDEX: 34.4 KG/M2 | RESPIRATION RATE: 16 BRPM

## 2020-05-08 LAB
BACTERIA SPEC CULT: ABNORMAL
SPECIMEN SOURCE: ABNORMAL

## 2020-05-08 PROCEDURE — 25000132 ZZH RX MED GY IP 250 OP 250 PS 637: Performed by: HOSPITALIST

## 2020-05-08 PROCEDURE — 99239 HOSP IP/OBS DSCHRG MGMT >30: CPT | Performed by: HOSPITALIST

## 2020-05-08 PROCEDURE — 25000132 ZZH RX MED GY IP 250 OP 250 PS 637: Performed by: INTERNAL MEDICINE

## 2020-05-08 PROCEDURE — 25000131 ZZH RX MED GY IP 250 OP 636 PS 637: Performed by: INTERNAL MEDICINE

## 2020-05-08 RX ORDER — CIPROFLOXACIN 500 MG/1
500 TABLET, FILM COATED ORAL EVERY 12 HOURS
Qty: 14 TABLET | Refills: 0 | Status: SHIPPED | OUTPATIENT
Start: 2020-05-08 | End: 2020-05-15

## 2020-05-08 RX ORDER — AMOXICILLIN 250 MG
2 CAPSULE ORAL 2 TIMES DAILY
Qty: 60 TABLET | Refills: 3 | Status: SHIPPED | OUTPATIENT
Start: 2020-05-08 | End: 2020-07-21

## 2020-05-08 RX ORDER — AMOXICILLIN 250 MG
2 CAPSULE ORAL 2 TIMES DAILY
Qty: 60 TABLET | Refills: 1 | Status: SHIPPED | OUTPATIENT
Start: 2020-05-08 | End: 2020-05-08

## 2020-05-08 RX ORDER — CIPROFLOXACIN 500 MG/1
500 TABLET, FILM COATED ORAL EVERY 12 HOURS SCHEDULED
Status: DISCONTINUED | OUTPATIENT
Start: 2020-05-08 | End: 2020-05-08 | Stop reason: HOSPADM

## 2020-05-08 RX ADMIN — PREDNISONE 5 MG: 5 TABLET ORAL at 08:41

## 2020-05-08 RX ADMIN — OYSTER SHELL CALCIUM WITH VITAMIN D 2 TABLET: 500; 200 TABLET, FILM COATED ORAL at 08:41

## 2020-05-08 RX ADMIN — SENNOSIDES AND DOCUSATE SODIUM 1 TABLET: 8.6; 5 TABLET ORAL at 08:41

## 2020-05-08 RX ADMIN — CIPROFLOXACIN HYDROCHLORIDE 500 MG: 500 TABLET, FILM COATED ORAL at 10:03

## 2020-05-08 ASSESSMENT — ACTIVITIES OF DAILY LIVING (ADL)
ADLS_ACUITY_SCORE: 11

## 2020-05-08 NOTE — PROGRESS NOTES
Chart Check Heme/Onc:    I talked with Negrito about clinic follow up. The clinic is in process of scheduling labs, follow up and zoledronic acid for next week. He can resume abiraterone and prednisone when he gets home.    Phil AZAR, [facility]  [phone] (office) or [phone] (cell)

## 2020-05-08 NOTE — PLAN OF CARE
Pt here with ANITA/ATN. A&O. VSS/temp max 99.7. Regular diet. Up ad miguel angel independent. Denies pain. Pride patent. Discharge home today pending cultures.

## 2020-05-08 NOTE — PLAN OF CARE
Pt is stable. Discharge instruction provided. All belongings sent home with pt. Medications sent home with pt.

## 2020-05-08 NOTE — PLAN OF CARE
Alert and oriented x 4. Denies pain. Pride patent. VSS on room air. IVFs infusing. Up with SBA  plan to discharge tomorrow

## 2020-05-08 NOTE — DISCHARGE SUMMARY
[facility]  Hospitalist Discharge Summary      Date of Admission:  5/5/2020  Date of Discharge:  5/8/2020  Discharging Provider: Luis Carlos Russell DO      Discharge Diagnoses   Acute kidney injury from obstructive uropathy    Follow-ups Needed After Discharge   Follow-up Appointments     Follow-up and recommended labs and tests       Follow up with primary care provider, Chacho Galan, within 7 days   for hospital follow- up.  No follow up labs or test are needed.      Follow-up with Dr. Paris as scheduled June 4th    Follow-up with [facility] in 1 week for repeat basic metabolic panel to   recheck kidney function             Unresulted Labs Ordered in the Past 30 Days of this Admission     No orders found from 4/5/2020 to 5/6/2020.      These results will be followed up by NA    Discharge Disposition   Discharged to home  Condition at discharge: Stable      Hospital Course   Acute kidney injury from obstructive uropathy  sCr to 4.06 (BUN 74) on presentation; Pt seen by his oncologist on day of presentation where sCr was elevated to 4.35 and US showed obstruction w/ bilateral hydronephrosis and bladder distension. With cooper flush >1L of urine drained. Likely post-renal due to renal obstruction.  -necourage fluids  - follow-up with BMP next week with the oncologist     Pyelonephritis from CoAg negative staph  WBC to 11.2 on presentation; likely stress de-margination versus UTI  UA is equivocal for infection  recurret fever overnight from 5/6-5/7  COVID ruled out  Discussed with [place] Desai to not exchange out the cooper catheter  - finish course of ciprofloxacin       Prostate CA with mets  On abiraterone and prednisone PTA.  - Continue PTA meds  - F/u with Oncologist     Consultations This Hospital Stay   UROLOGY IP CONSULT  HEMATOLOGY & ONCOLOGY IP CONSULT    Code Status   Full Code    Time Spent on this Encounter   I, Luis Carlos Russell DO, personally saw the patient today and  spent greater than 30 minutes discharging this patient.       Luis Carlos Russell, DO  [facility]  ______________________________________________________________________    Physical Exam   Vital Signs: Temp: 99.3  F (37.4  C) Temp src: Oral BP: 124/83 Pulse: 95   Resp: 16 SpO2: 96 % O2 Device: None (Room air)    Weight: 227 lbs 0 oz  Constitutional: Awake, alert, cooperative, no apparent distress.  Respiratory: Clear to auscultation bilaterally, no crackles or wheezing.  Cardiovascular: Regular rate and rhythm, normal S1 and S2, and no murmur noted.  GI: Soft, non-distended, non-tender, normal bowel sounds.  Lymph/Hematologic: No palpable adenopathy or unusual bleeding.  Skin: No rashes, no cyanosis, no edema.  Musculoskeletal: No joint swelling, erythema or tenderness. No CVA tenderness.  Neurologic: AOx4, no gross focal deficits.  Psychiatric: Appropriate and cooperative, no obvious anxiety or depression.       Primary Care Physician   Chacho Galan    Discharge Orders      Reason for your hospital stay    Obstructive uropathy  pyelonephritis     Follow-up and recommended labs and tests     Follow up with primary care provider, Chacho Galan, within 7 days for hospital follow- up.  No follow up labs or test are needed.      Follow-up with Dr. Paris as scheduled June 4th    Follow-up with [facility] in 1 week for repeat basic metabolic panel to recheck kidney function     Full Code       Significant Results and Procedures   Most Recent 3 CBC's:  Recent Labs   Lab Test 05/06/20  0645 05/05/20  1536 03/23/20  0836   WBC 11.1* 11.2* 15.3*   HGB 10.5* 11.0* 13.7   MCV 95 95 96    365 327     Most Recent 3 BMP's:  Recent Labs   Lab Test 05/07/20  0642 05/06/20  1506 05/06/20  0645    137 135   POTASSIUM 3.8 3.9 3.7   CHLORIDE 109 109 109   CO2 23 21 20   BUN 32* 44* 50*   CR 1.53* 1.82* 2.12*   ANIONGAP 6 7 6   JAXSON 7.7* 7.8* 7.3*   * 109* 96     Most Recent 2  LFT's:  Recent Labs   Lab Test 05/05/20  1536 03/20/20  1634   AST 13 8   ALT 25 14   ALKPHOS 105 83   BILITOTAL 0.5 0.7     Most Recent 3 INR's:  Recent Labs   Lab Test 03/23/20  0901   INR 1.13   ,   Results for orders placed or performed during the hospital encounter of 03/20/20   CT Abdomen Pelvis w/o Contrast    Narrative    CT ABDOMEN PELVIS W/O CONTRAST 3/20/2020 4:56 PM    CLINICAL HISTORY: left flank pain with nausea and vomiting  TECHNIQUE: CT scan of the abdomen and pelvis was performed without IV  contrast. Multiplanar reformats were obtained. Dose reduction  techniques were used.  CONTRAST: None.    COMPARISON: None.    FINDINGS:   LOWER CHEST: Coronary artery calcifications are noted. There are  micronodules in the visualized lung bases    HEPATOBILIARY: Unenhanced contours unremarkable. Gallbladder  unremarkable.    PANCREAS: Unremarkable.    SPLEEN: Spleen is normal in size.    ADRENAL GLANDS: Unenhanced contours are unremarkable.    KIDNEYS/BLADDER: Moderate bilateral hydroureteronephrosis with a  hydroureter extending to the distal aspects without evidence of  urolithiasis. There is a moderate asymmetric left periureteral and  perinephric stranding without loculated fluid collection. Moderate  distention of the urinary bladder. There is mild nodular enlargement  of the prostate that indents the base of the urinary bladder with  irregular layering hyperdensity in the dependent urinary bladder that  is inseparable from the nodular protrusion of the prostate as seen on  series 4/image 184.    BOWEL: No bowel obstruction. Mild distal colonic diverticulosis  without acute diverticulitis.    LYMPH NODES: Diffuse retroperitoneal and bilateral iliac chain  lymphadenopathy. Left paraortic lymph node at the level of the left  kidney measures 2.1 x 2.8 cm series 4/image 89, left external iliac  lymph node measures 1.9 x 3.1 cm series 4/image 153. There are  multiple lymph nodes adjacent to the inferior  mesenteric vasculature  measuring 1.4 x 1.6 cm series 4/image 110. There are numerous  bilateral internal iliac lymphadenopathy and tubular nodularity in the  mesorectal fascia.    VASCULATURE: Abdominal aorta normal in caliber with mild calcified  atheromatous plaque.    PELVIC ORGANS: Nodular enlargement of the prostate indenting the base  of the urinary bladder.    OTHER: Mild presacral edema. No pneumoperitoneum.    MUSCULOSKELETAL: There are multiple faint sclerotic lesions in the  visualized spine and pelvis, most prominent in the subtrochanteric  right proximal femur, intertrochanteric left proximal femur,  subchondral left femoral head.      Impression    IMPRESSION:   1.  Moderate bilateral hydroureteronephrosis extending to the level of  the distal ureters without focal transition point. There is moderate  urinary bladder retention and the hydronephrosis may be due to urinary  bladder outflow compromise/obstruction from the prostate (high  suspicion for prostate malignancy).  2.  Asymmetric left nephric stranding and edema is concerning for  superimposed ascending urinary tract infection.  3.  Retroperitoneal and iliac lymphadenopathy consistent with doc  metastatic disease and is favored to be from a primary prostate  malignancy. Additional differential considerations include rectal  malignancy, lymphoma, urothelial malignancy given the irregular  thickening in the posterior urinary bladder wall that is inseparable  from the prostate. Note that multiple lymph nodes are adjacent to the  ureters, however there is no high-grade ureteral caliber change to  suggest that the lymphadenopathy is a source of the obstruction.  4.  Nodularity in the mesial rectal fascia and lymphadenopathy along  the inferior mesenteric vasculature, is concerning for a component of  extramural vascular invasion.  5.  Sclerotic osseous metastatic disease.  6.  A few micronodules in the visualized lung bases, pulmonary  metastatic  disease is not excluded.    MALCOLM ALANIS MD   XR Chest Port 1 View    Narrative    EXAM: XR CHEST PORT 1 VW  LOCATION: [facility]  DATE/TIME: 3/20/2020 10:11 PM    INDICATION: Postoperative evaluation.    COMPARISON: None.    FINDINGS: Upright portable chest. The heart size is normal. The lungs are clear. No pneumothorax.      Impression    IMPRESSION: No acute abnormality.   NM Bone Scan Whole Body    Narrative    NUCLEAR MEDICINE WHOLE BODY BONE SCAN March 23, 2020 10:57 AM    HISTORY: Bone lesion, pelvis, incidental on CT, malignancy suspected.    COMPARISON:       Prior bone scan: None.       Other relevant study: A CT of the abdomen and pelvis on  3/20/2020.    TECHNIQUE: Following the uneventful intravenous administration of 26.8  mCi Tc99m MDP IV, routine delayed imaging was performed of the entire  body.    IMAGES OBTAINED: Planar whole body anterior/posterior images and  planar head/neck oblique images.    FINDINGS: There are numerous foci of increased radiotracer uptake  consistent with diffuse osseous metastatic disease. This includes  involvement of the majority of the spine as well as several bilateral  ribs, both proximal humeri, both proximal femurs, and the distal left  femur. There is also increased uptake within the sacrum bilaterally.  Increased uptake adjacent to the wrists and knees is likely  degenerative in nature. There is also degenerative uptake in both  feet. No other abnormal osseous uptake is demonstrated. There appears  to be bilateral hydronephrosis. No other soft tissue abnormality is  seen.       Impression    IMPRESSION: Diffuse osseous metastatic disease.     SAMIR LEARY MD   CT Abdomen Retroperitoneal Biopsy    Narrative    CT ABDOMEN RETROPERITONEAL BIOPSY 3/23/2020 10:32 AM    HISTORY: 64-year-old patient with elevated PSA and urinary retention.  Patient has scattered enlarged pelvic lymph nodes, request made for  biopsy for concern of prostate  cancer.    TECHNIQUE: Patient was brought to the CT department and informed  consent obtained. Patient was placed in a supine position. Skin  overlying the left lower quadrant was prepped and draped in standard  sterile fashion. 1% lidocaine was used for local anesthesia. With CT  guidance, a 17-gauge guide needle was advanced to a left external  iliac chain lymph node. Through this guide needle, 6 separate 18-gauge  core biopsy samples were obtained and sent to the lab for evaluation.  Patient tolerated the procedure well. No apparent complication.    Sedation: 1 mg IV Versed and 50 mcg IV fentanyl given for a moderate  level of sedation.  Sedation time: 20 minutes    Please note the above medications administered by care suites nursing  staff under my direct direct supervision.    Contrast: None    TECHNIQUE: Radiation dose for this scan was reduced using automated  exposure control, adjustment of the mA and/or kV according to patient  size, or iterative reconstruction technique. Noncontrast CT sequences  obtained, 6 in total during advancement of needle and biopsy samples.  Tiny bit of hemorrhage surrounding the lymph node at completion,  though vasculature intact.      Impression    IMPRESSION: Successful CT-guided biopsy of an enlarged lymph node in  the left external iliac chain of lymph nodes. The lymph node measures  approximately 2.9 x 1.6 cm.    ZAK VILLATORO MD       Discharge Medications   Current Discharge Medication List      START taking these medications    Details   ciprofloxacin (CIPRO) 500 MG tablet Take 1 tablet (500 mg) by mouth every 12 hours for 7 days  Qty: 14 tablet, Refills: 0    Associated Diagnoses: Obstructive uropathy         CONTINUE these medications which have CHANGED    Details   senna-docusate (SENOKOT-S/PERICOLACE) 8.6-50 MG tablet Take 2 tablets by mouth 2 times daily  Qty: 60 tablet, Refills: 1    Associated Diagnoses: Acute renal failure, unspecified acute renal failure type  (H)         CONTINUE these medications which have NOT CHANGED    Details   abiraterone (ZYTIGA) 250 MG tablet Take 1,000 mg by mouth daily      predniSONE (DELTASONE) 5 MG tablet Take 5 mg by mouth daily          STOP taking these medications       CALCIUM-VITAMIN D PO Comments:   Reason for Stopping:             Allergies   No Known Allergies

## 2020-05-12 ENCOUNTER — TRANSFERRED RECORDS (OUTPATIENT)
Dept: FAMILY MEDICINE | Facility: CLINIC | Age: 65
End: 2020-05-12

## 2020-05-12 ENCOUNTER — CARE COORDINATION (OUTPATIENT)
Dept: FAMILY MEDICINE | Facility: CLINIC | Age: 65
End: 2020-05-12

## 2020-05-12 NOTE — PROGRESS NOTES
"Hi, my name is @ Miesha@  @, a Medical Assistant, and I am calling on behalf of Chacho Villa's office at [facility].  I am calling to follow up and see how things are going for you after your recent visit.\"    \"I see that you were in the (ER/UC/IP) on 5/5/20-5/8/20   How are you doing now that you are home?\" Very well    Is patient experiencing symptoms that may require a hospital visit?  No    Discharge Instructions    \"Let's review your discharge instructions.  What is/are the follow-up recommendations?  Pt. Response: Finish abx, make F/U appts with PCP, Onc and Urology and cont. Other meds.    \"Were you instructed to make a follow-up appointment?\"  Pt. Response: Yes.  Has appointment been made?   Yes      \"When you see the provider, I would recommend that you bring your discharge instructions with you. And bring medications/or list along with you.    Medications    \"How many new medications are you on since your hospitalization/ED visit?\"    0-1  \"How many of your current medicines changed (dose, timing, name, etc.) while you were in the hospital/ED visit?\"   0-1  \"Do you have questions about your medications?\"   No  \"Were you newly diagnosed with heart failure, COPD, diabetes, dvt, blood clot, pulmonary emboli or did you have a heart attack?\"   No  For patients on insulin: \"Did you start on insulin in the hospital or did you have your insulin dose changed?\"   No    Medication reconciliation completed? Yes    Was MTM referral placed (*Make sure to put transitions as reason for referral)?   No    Call Summary    \"Do you have any questions or concerns about your condition or care plan at the moment?\"    No  Triage advice given: pt will follow up with PCP             pt will continue with ED advise             pt will  contact hospital, if symptoms return or get worse    Patient was in ER 2 times(including this one) in the past year (assess appropriateness of ER visits.)      \"If you have " "questions or things don't continue to improve, we encourage you contact us through the main clinic number,  [phone]. If the clinic is not open, the on-call provider is available to help you.     \"Thank you for your time and take care!\"          "

## 2020-05-14 ENCOUNTER — OFFICE VISIT (OUTPATIENT)
Dept: FAMILY MEDICINE | Facility: CLINIC | Age: 65
End: 2020-05-14

## 2020-05-14 VITALS
RESPIRATION RATE: 16 BRPM | WEIGHT: 215 LBS | TEMPERATURE: 98.5 F | SYSTOLIC BLOOD PRESSURE: 118 MMHG | BODY MASS INDEX: 32.69 KG/M2 | HEART RATE: 97 BPM | DIASTOLIC BLOOD PRESSURE: 72 MMHG | OXYGEN SATURATION: 99 %

## 2020-05-14 DIAGNOSIS — N17.0 ACUTE KIDNEY INJURY (AKI) WITH ACUTE TUBULAR NECROSIS (ATN) (H): Primary | ICD-10-CM

## 2020-05-14 DIAGNOSIS — N13.39 OTHER HYDRONEPHROSIS: ICD-10-CM

## 2020-05-14 PROBLEM — E66.01 MORBID OBESITY (H): Status: RESOLVED | Noted: 2019-09-10 | Resolved: 2020-05-14

## 2020-05-14 PROCEDURE — 99495 TRANSJ CARE MGMT MOD F2F 14D: CPT | Performed by: FAMILY MEDICINE

## 2020-05-14 NOTE — PROGRESS NOTES
SUBJECTIVE:                                                      Patient presents for Hospital Followup Visit:    Hospital:  [facility]      Date of Admission: 5/5/2020  Date of Discharge: 5/8/2020  Transitional Care Management Phone Call: yes  Reason(s) for Admission: acute renal failure due to obstruction and then catheter obstruction and Metatstatic Prostate CA            Problems taking medications regularly:  None       Medication changes since discharge: Stop calcium. Start Cipro 7 days       Problems adhering to non-medication therapy:  None    Summary of hospitalization:  [facility] discharge summary reviewed  Diagnostic Tests/Treatments reviewed.  Follow up needed: none  Other Healthcare Providers Involved in Patient s Care:         None  Update since discharge: improved.     ROS:  Constitutional, HEENT, cardiovascular, pulmonary, gi and gu systems are negative, except as otherwise noted.    OBJECTIVE:                                                      BP:118/72  Wt with comparison:   Wt Readings from Last 2 Encounters:   05/14/20 97.5 kg (215 lb)   05/05/20 103 kg (227 lb)     Pulse:97  Body mass index is 32.69 kg/m .    APPEARANCE: = Relaxed and in no distress  APPEARANCE: = catheter in place  Conj/Eyelids = noninjected and lids and lashes are without inflammation  PERRLA/Irises = Pupils Round Reactive to Light and Irisis without inflammation  Neck = No anterior or posterior adenopathy appreciated.  Thyroid = Not enlarged and no masses felt  Resp effort = Calm regular breathing  Breath Sounds = Good air movement with no rales or rhonchi in any lung fields  Heart Rate, Rhythm, & sounds (no Murm)  = Regular rate and rhythm with no S3, S4, or murmur appreciated.  Carotid Art's = Pulses full and equal and no bruits appreciated  Abdomen = Soft, nontender, no masses, & bowel sounds in all quadrants  Liver/Spleen = Normal span and no splenomegaly noted  Digits and Nails = FROM in all  finger joints, no nail dystrophy  Ext (edema) = No pretibial edema noted or elsewhere  Musculsktl =  Strength and ROM of major joints are within normal limits  SKIN = absent significant rashes or lesions   Recent/Remote Memory = Alert and Oriented x 3  Mood/Affect = Cooperative and interested        ASSESSMENT/PLAN:                                                      There are no diagnoses linked to this encounter.     Post Discharge Medication Reconciliation: discharge medications reconciled and changed, per note/orders (see AVS).  Issues to address: No issues identified.  Plan of care communicated with patient    Component      Latest Ref Rng & Units 6/25/2015 9/10/2019 3/20/2020 3/20/2020             3:08 PM  4:34 PM   Creatinine      0.66 - 1.25 mg/dL 0.95 0.80 7.79 (H) 9.29 (H)     Component      Latest Ref Rng & Units 3/21/2020 3/22/2020 3/23/2020 3/27/2020                Creatinine      0.66 - 1.25 mg/dL 3.09 (H) 1.78 (H) 1.60 (H) 1.65 (H)     Component      Latest Ref Rng & Units 4/3/2020 5/5/2020 5/5/2020 5/6/2020 5/7/2020           12:00 AM  3:36 PM     Creatinine      0.66 - 1.25 mg/dL 1.36 (H) 4.35 (H) 4.06 (H) 1.82 (H) 1.53 (H)     Assessment/Plan:  Andre was seen today for hospital f/u.    Diagnoses and all orders for this visit:    Acute kidney injury (ANITA) with acute tubular necrosis (ATN) (H)    Other hydronephrosis    >25 min spent with patient, greater than 50% spent on discussion/education/planning, etc. About The primary encounter diagnosis was Acute kidney injury (ANITA) with acute tubular necrosis (ATN) (H). A diagnosis of Other hydronephrosis was also pertinent to this visit.      Chacho Galan MD MD  [facility]  [phone]

## 2020-06-09 ENCOUNTER — TRANSFERRED RECORDS (OUTPATIENT)
Dept: FAMILY MEDICINE | Facility: CLINIC | Age: 65
End: 2020-06-09

## 2020-07-07 ENCOUNTER — TRANSFERRED RECORDS (OUTPATIENT)
Dept: FAMILY MEDICINE | Facility: CLINIC | Age: 65
End: 2020-07-07

## 2020-07-20 DIAGNOSIS — N17.9 ACUTE RENAL FAILURE, UNSPECIFIED ACUTE RENAL FAILURE TYPE (H): ICD-10-CM

## 2020-07-21 DIAGNOSIS — N17.9 ACUTE RENAL FAILURE, UNSPECIFIED ACUTE RENAL FAILURE TYPE (H): ICD-10-CM

## 2020-07-21 RX ORDER — AMOXICILLIN 250 MG
2 CAPSULE ORAL 2 TIMES DAILY
Qty: 120 TABLET | Refills: 1 | Status: CANCELLED | OUTPATIENT
Start: 2020-07-21 | End: 2020-08-20

## 2020-07-21 RX ORDER — DOCUSATE SODIUM 50MG AND SENNOSIDES 8.6MG 8.6; 5 MG/1; MG/1
TABLET, FILM COATED ORAL
Qty: 60 TABLET | Refills: 3 | Status: SHIPPED | OUTPATIENT
Start: 2020-07-21

## 2020-08-06 ENCOUNTER — TRANSFERRED RECORDS (OUTPATIENT)
Dept: FAMILY MEDICINE | Facility: CLINIC | Age: 65
End: 2020-08-06

## 2020-09-03 ENCOUNTER — TRANSFERRED RECORDS (OUTPATIENT)
Dept: FAMILY MEDICINE | Facility: CLINIC | Age: 65
End: 2020-09-03

## 2020-09-15 ENCOUNTER — TRANSFERRED RECORDS (OUTPATIENT)
Dept: FAMILY MEDICINE | Facility: CLINIC | Age: 65
End: 2020-09-15

## 2020-10-29 ENCOUNTER — TRANSFERRED RECORDS (OUTPATIENT)
Dept: FAMILY MEDICINE | Facility: CLINIC | Age: 65
End: 2020-10-29

## 2021-01-01 ENCOUNTER — OFFICE VISIT (OUTPATIENT)
Dept: FAMILY MEDICINE | Facility: CLINIC | Age: 66
End: 2021-01-01

## 2021-01-01 ENCOUNTER — TRANSFERRED RECORDS (OUTPATIENT)
Dept: FAMILY MEDICINE | Facility: CLINIC | Age: 66
End: 2021-01-01

## 2021-01-01 ENCOUNTER — MEDICAL CORRESPONDENCE (OUTPATIENT)
Dept: FAMILY MEDICINE | Facility: CLINIC | Age: 66
End: 2021-01-01

## 2021-01-01 ENCOUNTER — TRANSFERRED RECORDS (OUTPATIENT)
Dept: HEALTH INFORMATION MANAGEMENT | Facility: CLINIC | Age: 66
End: 2021-01-01

## 2021-01-01 ENCOUNTER — LAB REQUISITION (OUTPATIENT)
Dept: LAB | Facility: CLINIC | Age: 66
End: 2021-01-01
Payer: COMMERCIAL

## 2021-01-01 ENCOUNTER — APPOINTMENT (OUTPATIENT)
Dept: GENERAL RADIOLOGY | Facility: CLINIC | Age: 66
DRG: 987 | End: 2021-01-01
Attending: HOSPITALIST
Payer: COMMERCIAL

## 2021-01-01 ENCOUNTER — APPOINTMENT (OUTPATIENT)
Dept: PHYSICAL THERAPY | Facility: CLINIC | Age: 66
DRG: 987 | End: 2021-01-01
Attending: INTERNAL MEDICINE
Payer: COMMERCIAL

## 2021-01-01 ENCOUNTER — APPOINTMENT (OUTPATIENT)
Dept: GENERAL RADIOLOGY | Facility: CLINIC | Age: 66
End: 2021-01-01
Attending: THORACIC SURGERY (CARDIOTHORACIC VASCULAR SURGERY)
Payer: COMMERCIAL

## 2021-01-01 ENCOUNTER — TELEPHONE (OUTPATIENT)
Dept: INTERVENTIONAL RADIOLOGY/VASCULAR | Facility: CLINIC | Age: 66
End: 2021-01-01

## 2021-01-01 ENCOUNTER — HOSPITAL ENCOUNTER (OUTPATIENT)
Facility: CLINIC | Age: 66
Discharge: HOME | End: 2021-10-21
Attending: THORACIC SURGERY (CARDIOTHORACIC VASCULAR SURGERY) | Admitting: THORACIC SURGERY (CARDIOTHORACIC VASCULAR SURGERY)
Payer: COMMERCIAL

## 2021-01-01 ENCOUNTER — LAB (OUTPATIENT)
Dept: LAB | Facility: CLINIC | Age: 66
End: 2021-01-01
Attending: INTERNAL MEDICINE
Payer: COMMERCIAL

## 2021-01-01 ENCOUNTER — TELEPHONE (OUTPATIENT)
Dept: FAMILY MEDICINE | Facility: CLINIC | Age: 66
End: 2021-01-01

## 2021-01-01 ENCOUNTER — LAB (OUTPATIENT)
Dept: URGENT CARE | Facility: URGENT CARE | Age: 66
End: 2021-01-01
Attending: THORACIC SURGERY (CARDIOTHORACIC VASCULAR SURGERY)
Payer: COMMERCIAL

## 2021-01-01 ENCOUNTER — ANESTHESIA EVENT (OUTPATIENT)
Dept: SURGERY | Facility: CLINIC | Age: 66
DRG: 987 | End: 2021-01-01
Payer: COMMERCIAL

## 2021-01-01 ENCOUNTER — APPOINTMENT (OUTPATIENT)
Dept: CT IMAGING | Facility: CLINIC | Age: 66
DRG: 987 | End: 2021-01-01
Attending: HOSPITALIST
Payer: COMMERCIAL

## 2021-01-01 ENCOUNTER — INFUSION THERAPY VISIT (OUTPATIENT)
Dept: INFUSION THERAPY | Facility: CLINIC | Age: 66
End: 2021-01-01
Attending: INTERNAL MEDICINE
Payer: COMMERCIAL

## 2021-01-01 ENCOUNTER — HOSPITAL ENCOUNTER (INPATIENT)
Facility: CLINIC | Age: 66
LOS: 12 days | Discharge: HOME HEALTH | DRG: 987 | End: 2021-09-27
Attending: INTERNAL MEDICINE | Admitting: INTERNAL MEDICINE
Payer: COMMERCIAL

## 2021-01-01 ENCOUNTER — HOSPITAL ENCOUNTER (OUTPATIENT)
Facility: CLINIC | Age: 66
Discharge: HOME | End: 2021-11-01
Admitting: RADIOLOGY
Payer: COMMERCIAL

## 2021-01-01 ENCOUNTER — APPOINTMENT (OUTPATIENT)
Dept: GENERAL RADIOLOGY | Facility: CLINIC | Age: 66
End: 2021-01-01
Attending: EMERGENCY MEDICINE
Payer: COMMERCIAL

## 2021-01-01 ENCOUNTER — ANESTHESIA EVENT (OUTPATIENT)
Dept: SURGERY | Facility: CLINIC | Age: 66
End: 2021-01-01
Payer: COMMERCIAL

## 2021-01-01 ENCOUNTER — ANESTHESIA (OUTPATIENT)
Dept: SURGERY | Facility: CLINIC | Age: 66
End: 2021-01-01
Payer: COMMERCIAL

## 2021-01-01 ENCOUNTER — ANESTHESIA (OUTPATIENT)
Dept: SURGERY | Facility: CLINIC | Age: 66
DRG: 987 | End: 2021-01-01
Payer: COMMERCIAL

## 2021-01-01 ENCOUNTER — APPOINTMENT (OUTPATIENT)
Dept: CT IMAGING | Facility: CLINIC | Age: 66
End: 2021-01-01
Attending: EMERGENCY MEDICINE
Payer: COMMERCIAL

## 2021-01-01 ENCOUNTER — VIRTUAL VISIT (OUTPATIENT)
Dept: FAMILY MEDICINE | Facility: CLINIC | Age: 66
End: 2021-01-01

## 2021-01-01 ENCOUNTER — DOCUMENTATION ONLY (OUTPATIENT)
Dept: OTHER | Facility: CLINIC | Age: 66
End: 2021-01-01

## 2021-01-01 ENCOUNTER — APPOINTMENT (OUTPATIENT)
Dept: GENERAL RADIOLOGY | Facility: CLINIC | Age: 66
DRG: 987 | End: 2021-01-01
Attending: UROLOGY
Payer: COMMERCIAL

## 2021-01-01 ENCOUNTER — APPOINTMENT (OUTPATIENT)
Dept: CT IMAGING | Facility: CLINIC | Age: 66
DRG: 987 | End: 2021-01-01
Attending: INTERNAL MEDICINE
Payer: COMMERCIAL

## 2021-01-01 ENCOUNTER — HOSPITAL ENCOUNTER (OUTPATIENT)
Dept: CT IMAGING | Facility: CLINIC | Age: 66
End: 2021-11-01
Attending: INTERNAL MEDICINE
Payer: COMMERCIAL

## 2021-01-01 ENCOUNTER — HOSPITAL ENCOUNTER (EMERGENCY)
Facility: CLINIC | Age: 66
Discharge: ANOTHER HOSPITAL | End: 2021-09-15
Attending: EMERGENCY MEDICINE | Admitting: EMERGENCY MEDICINE
Payer: COMMERCIAL

## 2021-01-01 ENCOUNTER — LAB (OUTPATIENT)
Dept: URGENT CARE | Facility: URGENT CARE | Age: 66
End: 2021-01-01
Attending: FAMILY MEDICINE
Payer: COMMERCIAL

## 2021-01-01 VITALS
RESPIRATION RATE: 20 BRPM | SYSTOLIC BLOOD PRESSURE: 125 MMHG | TEMPERATURE: 98.5 F | HEIGHT: 69 IN | HEART RATE: 95 BPM | BODY MASS INDEX: 33.03 KG/M2 | WEIGHT: 223 LBS | DIASTOLIC BLOOD PRESSURE: 73 MMHG | OXYGEN SATURATION: 96 %

## 2021-01-01 VITALS
WEIGHT: 181 LBS | OXYGEN SATURATION: 99 % | BODY MASS INDEX: 26.72 KG/M2 | HEART RATE: 62 BPM | DIASTOLIC BLOOD PRESSURE: 64 MMHG | SYSTOLIC BLOOD PRESSURE: 104 MMHG

## 2021-01-01 VITALS
BODY MASS INDEX: 27.16 KG/M2 | DIASTOLIC BLOOD PRESSURE: 66 MMHG | SYSTOLIC BLOOD PRESSURE: 99 MMHG | TEMPERATURE: 98 F | RESPIRATION RATE: 16 BRPM | OXYGEN SATURATION: 100 % | WEIGHT: 184 LBS | HEART RATE: 16 BPM

## 2021-01-01 VITALS
SYSTOLIC BLOOD PRESSURE: 147 MMHG | OXYGEN SATURATION: 94 % | HEART RATE: 84 BPM | RESPIRATION RATE: 24 BRPM | TEMPERATURE: 97.2 F | DIASTOLIC BLOOD PRESSURE: 99 MMHG

## 2021-01-01 VITALS
BODY MASS INDEX: 28.2 KG/M2 | HEIGHT: 67 IN | OXYGEN SATURATION: 98 % | DIASTOLIC BLOOD PRESSURE: 72 MMHG | SYSTOLIC BLOOD PRESSURE: 100 MMHG | HEART RATE: 90 BPM | WEIGHT: 179.7 LBS | TEMPERATURE: 97.8 F | RESPIRATION RATE: 12 BRPM

## 2021-01-01 VITALS
SYSTOLIC BLOOD PRESSURE: 128 MMHG | WEIGHT: 207.4 LBS | DIASTOLIC BLOOD PRESSURE: 82 MMHG | OXYGEN SATURATION: 98 % | HEART RATE: 83 BPM | BODY MASS INDEX: 33.33 KG/M2 | HEIGHT: 66 IN

## 2021-01-01 VITALS
BODY MASS INDEX: 32.95 KG/M2 | DIASTOLIC BLOOD PRESSURE: 78 MMHG | SYSTOLIC BLOOD PRESSURE: 116 MMHG | WEIGHT: 205 LBS | HEART RATE: 94 BPM | TEMPERATURE: 98.1 F | RESPIRATION RATE: 16 BRPM | HEIGHT: 66 IN | OXYGEN SATURATION: 98 %

## 2021-01-01 VITALS
BODY MASS INDEX: 32.87 KG/M2 | HEART RATE: 66 BPM | DIASTOLIC BLOOD PRESSURE: 74 MMHG | RESPIRATION RATE: 16 BRPM | HEIGHT: 66 IN | OXYGEN SATURATION: 99 % | TEMPERATURE: 97.9 F | SYSTOLIC BLOOD PRESSURE: 114 MMHG | WEIGHT: 204.5 LBS

## 2021-01-01 VITALS
DIASTOLIC BLOOD PRESSURE: 72 MMHG | BODY MASS INDEX: 32.42 KG/M2 | WEIGHT: 197.8 LBS | OXYGEN SATURATION: 99 % | HEART RATE: 84 BPM | SYSTOLIC BLOOD PRESSURE: 110 MMHG

## 2021-01-01 DIAGNOSIS — E87.1 HYPONATREMIA: Primary | ICD-10-CM

## 2021-01-01 DIAGNOSIS — C61 PROSTATE CANCER METASTATIC TO MULTIPLE SITES (H): ICD-10-CM

## 2021-01-01 DIAGNOSIS — C61 PROSTATE CANCER (H): Primary | ICD-10-CM

## 2021-01-01 DIAGNOSIS — C61 PROSTATE CANCER (H): ICD-10-CM

## 2021-01-01 DIAGNOSIS — C79.51 BONE METASTASIS: ICD-10-CM

## 2021-01-01 DIAGNOSIS — K59.09 OTHER CONSTIPATION: ICD-10-CM

## 2021-01-01 DIAGNOSIS — C7A.1 NEUROENDOCRINE CARCINOMA, HIGH GRADE (H): ICD-10-CM

## 2021-01-01 DIAGNOSIS — G47.9 SLEEP DISORDER: Primary | ICD-10-CM

## 2021-01-01 DIAGNOSIS — D64.9 ANEMIA: ICD-10-CM

## 2021-01-01 DIAGNOSIS — C44.91 SKIN CANCER, BASAL CELL: Primary | ICD-10-CM

## 2021-01-01 DIAGNOSIS — J02.9 SORE THROAT: ICD-10-CM

## 2021-01-01 DIAGNOSIS — G47.33 OSA (OBSTRUCTIVE SLEEP APNEA): ICD-10-CM

## 2021-01-01 DIAGNOSIS — M54.50 LEFT-SIDED LOW BACK PAIN WITHOUT SCIATICA, UNSPECIFIED CHRONICITY: ICD-10-CM

## 2021-01-01 DIAGNOSIS — R09.81 NASAL CONGESTION: ICD-10-CM

## 2021-01-01 DIAGNOSIS — N17.0 ACUTE KIDNEY INJURY (AKI) WITH ACUTE TUBULAR NECROSIS (ATN) (H): Primary | ICD-10-CM

## 2021-01-01 DIAGNOSIS — C7A.1 NEUROENDOCRINE CARCINOMA, HIGH GRADE (H): Primary | ICD-10-CM

## 2021-01-01 DIAGNOSIS — C61 MALIGNANT NEOPLASM OF PROSTATE (H): ICD-10-CM

## 2021-01-01 DIAGNOSIS — G89.18 ACUTE POST-OPERATIVE PAIN: Primary | ICD-10-CM

## 2021-01-01 DIAGNOSIS — R10.84 ABDOMINAL PAIN, GENERALIZED: ICD-10-CM

## 2021-01-01 DIAGNOSIS — C79.51 BONE METASTASIS: Primary | ICD-10-CM

## 2021-01-01 DIAGNOSIS — N13.30 HYDRONEPHROSIS, UNSPECIFIED HYDRONEPHROSIS TYPE: ICD-10-CM

## 2021-01-01 DIAGNOSIS — K59.00 CONSTIPATION, UNSPECIFIED CONSTIPATION TYPE: ICD-10-CM

## 2021-01-01 DIAGNOSIS — R05.9 COUGH: ICD-10-CM

## 2021-01-01 DIAGNOSIS — N13.39 OTHER HYDRONEPHROSIS: ICD-10-CM

## 2021-01-01 DIAGNOSIS — R06.02 SHORTNESS OF BREATH: ICD-10-CM

## 2021-01-01 DIAGNOSIS — Z23 HIGH PRIORITY FOR COVID-19 VIRUS VACCINATION: Primary | ICD-10-CM

## 2021-01-01 DIAGNOSIS — L98.9 SKIN LESION: Primary | ICD-10-CM

## 2021-01-01 DIAGNOSIS — N18.31 STAGE 3A CHRONIC KIDNEY DISEASE (H): ICD-10-CM

## 2021-01-01 DIAGNOSIS — F10.19 ALCOHOL ABUSE WITH UNSPECIFIED ALCOHOL-INDUCED DISORDER (H): ICD-10-CM

## 2021-01-01 DIAGNOSIS — Z11.59 ENCOUNTER FOR SCREENING FOR OTHER VIRAL DISEASES: ICD-10-CM

## 2021-01-01 DIAGNOSIS — Z23 NEED FOR VACCINATION: ICD-10-CM

## 2021-01-01 DIAGNOSIS — J02.0 STREPTOCOCCAL SORE THROAT: ICD-10-CM

## 2021-01-01 DIAGNOSIS — Z13.6 SCREENING FOR HEART DISEASE: ICD-10-CM

## 2021-01-01 DIAGNOSIS — C44.619 BASAL CELL CARCINOMA OF SKIN OF LEFT UPPER LIMB, INCLUDING SHOULDER: ICD-10-CM

## 2021-01-01 DIAGNOSIS — Z23 HIGH PRIORITY FOR 2019 NOVEL CORONAVIRUS VACCINATION: Primary | ICD-10-CM

## 2021-01-01 DIAGNOSIS — Z20.822 SUSPECTED COVID-19 VIRUS INFECTION: Primary | ICD-10-CM

## 2021-01-01 DIAGNOSIS — K59.03 DRUG-INDUCED CONSTIPATION: Primary | ICD-10-CM

## 2021-01-01 LAB
% GRANULOCYTES: 84.3 % (ref 42.2–75.2)
.: NORMAL
ABO/RH(D): NORMAL
ALBUMIN SERPL-MCNC: 1.9 G/DL (ref 3.4–5)
ALBUMIN SERPL-MCNC: 2.1 G/DL (ref 3.4–5)
ALBUMIN SERPL-MCNC: 2.1 G/DL (ref 3.4–5)
ALBUMIN SERPL-MCNC: 2.3 G/DL (ref 3.4–5)
ALBUMIN SERPL-MCNC: 2.3 G/DL (ref 3.4–5)
ALBUMIN SERPL-MCNC: 2.4 G/DL (ref 3.4–5)
ALBUMIN SERPL-MCNC: 4.5 G/DL (ref 3.8–4.8)
ALBUMIN UR-MCNC: 30 MG/DL
ALBUMIN/GLOB SERPL: 1.8 {RATIO} (ref 1.2–2.2)
ALP SERPL-CCNC: 147 U/L (ref 40–150)
ALP SERPL-CCNC: 164 U/L (ref 40–150)
ALP SERPL-CCNC: 164 U/L (ref 40–150)
ALP SERPL-CCNC: 169 U/L (ref 40–150)
ALP SERPL-CCNC: 191 U/L (ref 40–150)
ALP SERPL-CCNC: 62 IU/L (ref 48–121)
ALT SERPL W P-5'-P-CCNC: 14 U/L (ref 0–70)
ALT SERPL W P-5'-P-CCNC: 17 U/L (ref 0–70)
ALT SERPL W P-5'-P-CCNC: 17 U/L (ref 0–70)
ALT SERPL W P-5'-P-CCNC: 18 U/L (ref 0–70)
ALT SERPL W P-5'-P-CCNC: 20 U/L (ref 0–70)
ALT SERPL-CCNC: 12 U/L (ref 7–40)
ALT SERPL-CCNC: 9 IU/L (ref 0–44)
ANION GAP SERPL CALCULATED.3IONS-SCNC: 10 MMOL/L (ref 3–14)
ANION GAP SERPL CALCULATED.3IONS-SCNC: 3 MMOL/L (ref 3–14)
ANION GAP SERPL CALCULATED.3IONS-SCNC: 5 MMOL/L (ref 3–14)
ANION GAP SERPL CALCULATED.3IONS-SCNC: 6 MMOL/L (ref 3–14)
ANION GAP SERPL CALCULATED.3IONS-SCNC: 7 MMOL/L (ref 3–14)
ANION GAP SERPL CALCULATED.3IONS-SCNC: 7 MMOL/L (ref 3–14)
ANION GAP SERPL CALCULATED.3IONS-SCNC: 8 MMOL/L (ref 3–14)
ANION GAP SERPL CALCULATED.3IONS-SCNC: 9 MMOL/L (ref 3–14)
ANTIBODY SCREEN: NEGATIVE
APPEARANCE UR: CLEAR
AST SERPL W P-5'-P-CCNC: 102 U/L (ref 0–45)
AST SERPL W P-5'-P-CCNC: 48 U/L (ref 0–45)
AST SERPL W P-5'-P-CCNC: 48 U/L (ref 0–45)
AST SERPL W P-5'-P-CCNC: 55 U/L (ref 0–45)
AST SERPL W P-5'-P-CCNC: 89 U/L (ref 0–45)
AST SERPL-CCNC: 12 IU/L (ref 0–40)
AST SERPL-CCNC: 31 U/L (ref 13–40)
ATRIAL RATE - MUSE: 83 BPM
BACTERIA #/AREA URNS HPF: ABNORMAL /HPF
BACTERIA UR CULT: ABNORMAL
BACTERIA UR CULT: ABNORMAL
BASOPHILS # BLD MANUAL: 0 10E3/UL (ref 0–0.2)
BASOPHILS NFR BLD MANUAL: 0 %
BILIRUB DIRECT SERPL-MCNC: 0.2 MG/DL (ref 0–0.2)
BILIRUB SERPL-MCNC: 0.2 MG/DL (ref 0.2–1.3)
BILIRUB SERPL-MCNC: 0.3 MG/DL (ref 0–1.2)
BILIRUB SERPL-MCNC: 0.4 MG/DL (ref 0.2–1.3)
BILIRUB SERPL-MCNC: 0.4 MG/DL (ref 0.2–1.3)
BILIRUB SERPL-MCNC: 0.5 MG/DL (ref 0.2–1.3)
BILIRUB SERPL-MCNC: 0.6 MG/DL (ref 0.2–1.3)
BILIRUB UR QL STRIP: NEGATIVE
BLD PROD TYP BPU: NORMAL
BLOOD COMPONENT TYPE: NORMAL
BUN SERPL-MCNC: 16 MG/DL (ref 7–30)
BUN SERPL-MCNC: 17 MG/DL (ref 7–30)
BUN SERPL-MCNC: 18 MG/DL (ref 7–30)
BUN SERPL-MCNC: 19 MG/DL (ref 8–27)
BUN SERPL-MCNC: 20 MG/DL (ref 7–30)
BUN SERPL-MCNC: 25 MG/DL (ref 7–30)
BUN SERPL-MCNC: 31 MG/DL (ref 7–30)
BUN SERPL-MCNC: 32 MG/DL (ref 7–30)
BUN SERPL-MCNC: 32 MG/DL (ref 7–30)
BUN SERPL-MCNC: 34 MG/DL (ref 7–30)
BUN SERPL-MCNC: 35 MG/DL (ref 7–30)
BUN SERPL-MCNC: 40 MG/DL (ref 7–30)
BUN SERPL-MCNC: 44 MG/DL (ref 7–30)
BUN SERPL-MCNC: 47 MG/DL (ref 7–30)
BUN/CREATININE RATIO: 17 (ref 10–24)
CA-I BLD-MCNC: 4.8 MG/DL (ref 4.4–5.2)
CA-I BLD-MCNC: 5.3 MG/DL (ref 4.4–5.2)
CA-I BLD-MCNC: 5.5 MG/DL (ref 4.4–5.2)
CA-I BLD-MCNC: 6.1 MG/DL (ref 4.4–5.2)
CALCIUM SERPL-MCNC: 10.4 MG/DL (ref 8.5–10.1)
CALCIUM SERPL-MCNC: 10.5 MG/DL (ref 8.5–10.1)
CALCIUM SERPL-MCNC: 10.5 MG/DL (ref 8.6–10.2)
CALCIUM SERPL-MCNC: 11.4 MG/DL (ref 8.5–10.1)
CALCIUM SERPL-MCNC: 11.8 MG/DL (ref 8.5–10.1)
CALCIUM SERPL-MCNC: 8.1 MG/DL (ref 8.5–10.1)
CALCIUM SERPL-MCNC: 8.1 MG/DL (ref 8.5–10.1)
CALCIUM SERPL-MCNC: 8.4 MG/DL (ref 8.5–10.1)
CALCIUM SERPL-MCNC: 8.7 MG/DL (ref 8.5–10.1)
CALCIUM SERPL-MCNC: 8.9 MG/DL (ref 8.5–10.1)
CALCIUM SERPL-MCNC: 9 MG/DL (ref 8.5–10.1)
CALCIUM SERPL-MCNC: 9.1 MG/DL (ref 8.5–10.1)
CALCIUM SERPL-MCNC: 9.2 MG/DL (ref 8.5–10.1)
CALCIUM SERPL-MCNC: 9.3 MG/DL (ref 8.5–10.1)
CALCIUM SERPL-MCNC: 9.8 MG/DL (ref 8.5–10.1)
CALCIUM SERPL-MCNC: 9.9 MG/DL (ref 8.5–10.1)
CHLORIDE BLD-SCNC: 103 MMOL/L (ref 94–109)
CHLORIDE BLD-SCNC: 105 MMOL/L (ref 94–109)
CHLORIDE BLD-SCNC: 108 MMOL/L (ref 94–109)
CHLORIDE BLD-SCNC: 92 MMOL/L (ref 94–109)
CHLORIDE BLD-SCNC: 93 MMOL/L (ref 94–109)
CHLORIDE BLD-SCNC: 94 MMOL/L (ref 94–109)
CHLORIDE BLD-SCNC: 95 MMOL/L (ref 94–109)
CHLORIDE BLD-SCNC: 96 MMOL/L (ref 94–109)
CHLORIDE BLD-SCNC: 97 MMOL/L (ref 94–109)
CHLORIDE BLD-SCNC: 98 MMOL/L (ref 94–109)
CHLORIDE BLD-SCNC: 99 MMOL/L (ref 94–109)
CHLORIDE BLD-SCNC: 99 MMOL/L (ref 94–109)
CHLORIDE SERPLBLD-SCNC: 100 MMOL/L (ref 96–106)
CHOLEST SERPL-MCNC: 208 MG/DL (ref 100–199)
CLINICIAN PROVIDED ICD10: NORMAL
CO2 SERPL-SCNC: 19 MMOL/L (ref 20–32)
CO2 SERPL-SCNC: 19 MMOL/L (ref 20–32)
CO2 SERPL-SCNC: 20 MMOL/L (ref 20–32)
CO2 SERPL-SCNC: 21 MMOL/L (ref 20–32)
CO2 SERPL-SCNC: 21 MMOL/L (ref 20–32)
CO2 SERPL-SCNC: 22 MMOL/L (ref 20–32)
CO2 SERPL-SCNC: 23 MMOL/L (ref 20–32)
CO2 SERPL-SCNC: 24 MMOL/L (ref 20–32)
CO2 SERPL-SCNC: 25 MMOL/L (ref 20–32)
CO2 SERPL-SCNC: 25 MMOL/L (ref 20–32)
CO2 SERPL-SCNC: 26 MMOL/L (ref 20–32)
CO2 SERPL-SCNC: 27 MMOL/L (ref 20–32)
CODING SYSTEM: NORMAL
COLOR UR AUTO: ABNORMAL
CREAT BLD-MCNC: 0.9 MG/DL (ref 0.7–1.3)
CREAT SERPL-MCNC: 1.11 MG/DL (ref 0.66–1.25)
CREAT SERPL-MCNC: 1.12 MG/DL (ref 0.76–1.27)
CREAT SERPL-MCNC: 1.15 MG/DL (ref 0.66–1.25)
CREAT SERPL-MCNC: 1.23 MG/DL (ref 0.66–1.25)
CREAT SERPL-MCNC: 1.24 MG/DL (ref 0.66–1.25)
CREAT SERPL-MCNC: 1.28 MG/DL (ref 0.66–1.25)
CREAT SERPL-MCNC: 1.3 MG/DL (ref 0.66–1.25)
CREAT SERPL-MCNC: 1.36 MG/DL (ref 0.66–1.25)
CREAT SERPL-MCNC: 1.4 MG/DL (ref 0.66–1.25)
CREAT SERPL-MCNC: 1.44 MG/DL (ref 0.66–1.25)
CREAT SERPL-MCNC: 1.45 MG/DL (ref 0.66–1.25)
CREAT SERPL-MCNC: 1.61 MG/DL (ref 0.66–1.25)
CREAT SERPL-MCNC: 1.62 MG/DL (ref 0.66–1.25)
CREAT SERPL-MCNC: 1.76 MG/DL (ref 0.66–1.25)
CREAT SERPL-MCNC: 1.77 MG/DL (ref 0.66–1.25)
CREAT SERPL-MCNC: 2.06 MG/DL (ref 0.66–1.25)
CREATININE (EXTERNAL): 1.12 MG/DL (ref 0.5–1.2)
CROSSMATCH: NORMAL
DIASTOLIC BLOOD PRESSURE - MUSE: NORMAL MMHG
EGFR IF AFRICN AM: 79 ML/MIN/1.73
EGFR IF NONAFRICN AM: 68 ML/MIN/1.73
EOSINOPHIL # BLD MANUAL: 0 10E3/UL (ref 0–0.7)
EOSINOPHIL NFR BLD MANUAL: 0 %
ERYTHROCYTE [DISTWIDTH] IN BLOOD BY AUTOMATED COUNT: 13.2 % (ref 10–15)
ERYTHROCYTE [DISTWIDTH] IN BLOOD BY AUTOMATED COUNT: 13.2 % (ref 10–15)
ERYTHROCYTE [DISTWIDTH] IN BLOOD BY AUTOMATED COUNT: 13.3 % (ref 10–15)
ERYTHROCYTE [DISTWIDTH] IN BLOOD BY AUTOMATED COUNT: 13.5 % (ref 10–15)
ERYTHROCYTE [DISTWIDTH] IN BLOOD BY AUTOMATED COUNT: 13.7 % (ref 10–15)
ERYTHROCYTE [DISTWIDTH] IN BLOOD BY AUTOMATED COUNT: 13.8 % (ref 10–15)
ERYTHROCYTE [DISTWIDTH] IN BLOOD BY AUTOMATED COUNT: 13.9 % (ref 10–15)
ERYTHROCYTE [DISTWIDTH] IN BLOOD BY AUTOMATED COUNT: 14 % (ref 10–15)
ERYTHROCYTE [DISTWIDTH] IN BLOOD BY AUTOMATED COUNT: 14.2 % (ref 10–15)
ERYTHROCYTE [DISTWIDTH] IN BLOOD BY AUTOMATED COUNT: 14.4 % (ref 10–15)
ERYTHROCYTE [DISTWIDTH] IN BLOOD BY AUTOMATED COUNT: 15.7 % (ref 10–15)
GFR SERPL CREATININE-BSD FRML MDRD: 32 ML/MIN/1.73M2
GFR SERPL CREATININE-BSD FRML MDRD: 39 ML/MIN/1.73M2
GFR SERPL CREATININE-BSD FRML MDRD: 39 ML/MIN/1.73M2
GFR SERPL CREATININE-BSD FRML MDRD: 43 ML/MIN/1.73M2
GFR SERPL CREATININE-BSD FRML MDRD: 44 ML/MIN/1.73M2
GFR SERPL CREATININE-BSD FRML MDRD: 49 ML/MIN/1.73M2
GFR SERPL CREATININE-BSD FRML MDRD: 50 ML/MIN/1.73M2
GFR SERPL CREATININE-BSD FRML MDRD: 52 ML/MIN/1.73M2
GFR SERPL CREATININE-BSD FRML MDRD: 53 ML/MIN/1.73M2
GFR SERPL CREATININE-BSD FRML MDRD: 57 ML/MIN/1.73M2
GFR SERPL CREATININE-BSD FRML MDRD: 58 ML/MIN/1.73M2
GFR SERPL CREATININE-BSD FRML MDRD: 60 ML/MIN/1.73M2
GFR SERPL CREATININE-BSD FRML MDRD: 61 ML/MIN/1.73M2
GFR SERPL CREATININE-BSD FRML MDRD: 66 ML/MIN/1.73M2
GFR SERPL CREATININE-BSD FRML MDRD: 69 ML/MIN/1.73M2
GFR SERPL CREATININE-BSD FRML MDRD: >60 ML/MIN/1.73M2
GLOBULIN, TOTAL: 2.5 G/DL (ref 1.5–4.5)
GLUCOSE (EXTERNAL): 102 MG/DL (ref 73–126)
GLUCOSE BLD-MCNC: 103 MG/DL (ref 70–99)
GLUCOSE BLD-MCNC: 113 MG/DL (ref 70–99)
GLUCOSE BLD-MCNC: 118 MG/DL (ref 70–99)
GLUCOSE BLD-MCNC: 122 MG/DL (ref 70–99)
GLUCOSE BLD-MCNC: 85 MG/DL (ref 70–99)
GLUCOSE BLD-MCNC: 87 MG/DL (ref 70–99)
GLUCOSE BLD-MCNC: 89 MG/DL (ref 70–99)
GLUCOSE BLD-MCNC: 89 MG/DL (ref 70–99)
GLUCOSE BLD-MCNC: 90 MG/DL (ref 70–99)
GLUCOSE BLD-MCNC: 93 MG/DL (ref 70–99)
GLUCOSE BLD-MCNC: 93 MG/DL (ref 70–99)
GLUCOSE BLD-MCNC: 94 MG/DL (ref 70–99)
GLUCOSE BLD-MCNC: 94 MG/DL (ref 70–99)
GLUCOSE BLD-MCNC: 97 MG/DL (ref 70–99)
GLUCOSE BLD-MCNC: 99 MG/DL (ref 70–99)
GLUCOSE SERPL-MCNC: 102 MG/DL (ref 65–99)
GLUCOSE UR STRIP-MCNC: NEGATIVE MG/DL
HCT VFR BLD AUTO: 24.8 % (ref 40–53)
HCT VFR BLD AUTO: 25.2 % (ref 40–53)
HCT VFR BLD AUTO: 25.8 % (ref 40–53)
HCT VFR BLD AUTO: 26.6 % (ref 40–53)
HCT VFR BLD AUTO: 26.6 % (ref 40–53)
HCT VFR BLD AUTO: 26.7 % (ref 40–53)
HCT VFR BLD AUTO: 26.8 % (ref 40–53)
HCT VFR BLD AUTO: 27.5 % (ref 40–53)
HCT VFR BLD AUTO: 27.6 % (ref 40–53)
HCT VFR BLD AUTO: 27.8 % (ref 40–53)
HCT VFR BLD AUTO: 28.5 % (ref 40–53)
HCT VFR BLD AUTO: 30.5 % (ref 40–53)
HCT VFR BLD AUTO: 31.7 % (ref 40–53)
HCT VFR BLD AUTO: 38.9 % (ref 39–51)
HDLC SERPL-MCNC: 76 MG/DL
HEMOGLOBIN: 14 G/DL (ref 13.4–17.5)
HGB BLD-MCNC: 10.1 G/DL (ref 13.3–17.7)
HGB BLD-MCNC: 7.9 G/DL (ref 13.3–17.7)
HGB BLD-MCNC: 8 G/DL (ref 13.3–17.7)
HGB BLD-MCNC: 8.1 G/DL (ref 13.3–17.7)
HGB BLD-MCNC: 8.2 G/DL (ref 13.3–17.7)
HGB BLD-MCNC: 8.5 G/DL (ref 13.3–17.7)
HGB BLD-MCNC: 8.6 G/DL (ref 13.3–17.7)
HGB BLD-MCNC: 8.8 G/DL (ref 13.3–17.7)
HGB BLD-MCNC: 9 G/DL (ref 13.3–17.7)
HGB BLD-MCNC: 9.1 G/DL (ref 13.3–17.7)
HGB BLD-MCNC: 9.8 G/DL (ref 13.3–17.7)
HGB UR QL STRIP: ABNORMAL
HOLD SPECIMEN: NORMAL
INR PPP: 1.02 (ref 0.85–1.15)
INTERPRETATION ECG - MUSE: NORMAL
ISSUE DATE AND TIME: NORMAL
KETONES UR STRIP-MCNC: NEGATIVE MG/DL
LACTATE SERPL-SCNC: 1.2 MMOL/L (ref 0.7–2)
LACTATE SERPL-SCNC: 1.2 MMOL/L (ref 0.7–2)
LDH SERPL L TO P-CCNC: 328 U/L (ref 85–227)
LDL/HDL RATIO: 1.5 RATIO (ref 0–3.6)
LDLC SERPL CALC-MCNC: 114 MG/DL (ref 0–99)
LEUKOCYTE ESTERASE UR QL STRIP: ABNORMAL
LIPASE SERPL-CCNC: 95 U/L (ref 73–393)
LYMPHOCYTES # BLD MANUAL: 1.3 10E3/UL (ref 0.8–5.3)
LYMPHOCYTES # BLD MANUAL: 1.9 10E3/UL (ref 0.8–5.3)
LYMPHOCYTES NFR BLD AUTO: 11.8 % (ref 20.5–51.1)
LYMPHOCYTES NFR BLD MANUAL: 10 %
LYMPHOCYTES NFR BLD MANUAL: 3 %
LYMPHOCYTES NFR BLD MANUAL: 6 %
LYMPHOCYTES NFR BLD MANUAL: 9 %
MAGNESIUM SERPL-MCNC: 1.9 MG/DL (ref 1.6–2.3)
MAGNESIUM SERPL-MCNC: 2.1 MG/DL (ref 1.6–2.3)
MAGNESIUM SERPL-MCNC: 2.1 MG/DL (ref 1.6–2.3)
MCH RBC QN AUTO: 29.5 PG (ref 26.5–33)
MCH RBC QN AUTO: 29.5 PG (ref 26.5–33)
MCH RBC QN AUTO: 29.6 PG (ref 26.5–33)
MCH RBC QN AUTO: 29.7 PG (ref 26.5–33)
MCH RBC QN AUTO: 29.7 PG (ref 26.5–33)
MCH RBC QN AUTO: 29.8 PG (ref 26.5–33)
MCH RBC QN AUTO: 29.9 PG (ref 26.5–33)
MCH RBC QN AUTO: 30 PG (ref 26.5–33)
MCH RBC QN AUTO: 30.6 PG (ref 26.5–33)
MCH RBC QN AUTO: 30.7 PG (ref 26.5–33)
MCH RBC QN AUTO: 30.7 PG (ref 26.5–33)
MCH RBC QN AUTO: 30.8 PG (ref 26.5–33)
MCH RBC QN AUTO: 30.9 PG (ref 26.5–33)
MCH RBC QN AUTO: 33.1 PG (ref 27–31)
MCHC RBC AUTO-ENTMCNC: 30.8 G/DL (ref 31.5–36.5)
MCHC RBC AUTO-ENTMCNC: 30.9 G/DL (ref 31.5–36.5)
MCHC RBC AUTO-ENTMCNC: 31.4 G/DL (ref 31.5–36.5)
MCHC RBC AUTO-ENTMCNC: 31.6 G/DL (ref 31.5–36.5)
MCHC RBC AUTO-ENTMCNC: 31.7 G/DL (ref 31.5–36.5)
MCHC RBC AUTO-ENTMCNC: 31.8 G/DL (ref 31.5–36.5)
MCHC RBC AUTO-ENTMCNC: 31.9 G/DL (ref 31.5–36.5)
MCHC RBC AUTO-ENTMCNC: 32 G/DL (ref 31.5–36.5)
MCHC RBC AUTO-ENTMCNC: 32.1 G/DL (ref 31.5–36.5)
MCHC RBC AUTO-ENTMCNC: 32.1 G/DL (ref 31.5–36.5)
MCHC RBC AUTO-ENTMCNC: 32.7 G/DL (ref 31.5–36.5)
MCHC RBC AUTO-ENTMCNC: 35.9 G/DL (ref 33–37)
MCV RBC AUTO: 92.1 FL (ref 80–100)
MCV RBC AUTO: 93 FL (ref 78–100)
MCV RBC AUTO: 94 FL (ref 78–100)
MCV RBC AUTO: 96 FL (ref 78–100)
MCV RBC AUTO: 97 FL (ref 78–100)
MCV RBC AUTO: 97 FL (ref 78–100)
METAMYELOCYTES # BLD MANUAL: 0.1 10E3/UL
METAMYELOCYTES # BLD MANUAL: 0.4 10E3/UL
METAMYELOCYTES # BLD MANUAL: 2.2 10E3/UL
METAMYELOCYTES NFR BLD MANUAL: 1 %
METAMYELOCYTES NFR BLD MANUAL: 2 %
METAMYELOCYTES NFR BLD MANUAL: 5 %
MONOCYTES # BLD MANUAL: 1 10E3/UL (ref 0–1.3)
MONOCYTES # BLD MANUAL: 1.3 10E3/UL (ref 0–1.3)
MONOCYTES # BLD MANUAL: 1.5 10E3/UL (ref 0–1.3)
MONOCYTES # BLD MANUAL: 2.1 10E3/UL (ref 0–1.3)
MONOCYTES NFR BLD AUTO: 3.9 % (ref 1.7–9.3)
MONOCYTES NFR BLD MANUAL: 10 %
MONOCYTES NFR BLD MANUAL: 3 %
MONOCYTES NFR BLD MANUAL: 7 %
MONOCYTES NFR BLD MANUAL: 8 %
MYELOCYTES # BLD MANUAL: 0.7 10E3/UL
MYELOCYTES # BLD MANUAL: 0.8 10E3/UL
MYELOCYTES # BLD MANUAL: 1.3 10E3/UL
MYELOCYTES NFR BLD MANUAL: 3 %
MYELOCYTES NFR BLD MANUAL: 4 %
MYELOCYTES NFR BLD MANUAL: 5 %
NEUTROPHILS # BLD MANUAL: 11.3 10E3/UL (ref 1.6–8.3)
NEUTROPHILS # BLD MANUAL: 14.4 10E3/UL (ref 1.6–8.3)
NEUTROPHILS # BLD MANUAL: 17.8 10E3/UL (ref 1.6–8.3)
NEUTROPHILS # BLD MANUAL: 38.5 10E3/UL (ref 1.6–8.3)
NEUTROPHILS NFR BLD MANUAL: 76 %
NEUTROPHILS NFR BLD MANUAL: 78 %
NEUTROPHILS NFR BLD MANUAL: 84 %
NEUTROPHILS NFR BLD MANUAL: 86 %
NITRATE UR QL: POSITIVE
NRBC # BLD AUTO: 0.9 10E3/UL
NRBC BLD MANUAL-RTO: 2 %
NT-PROBNP SERPL-MCNC: 902 PG/ML (ref 0–900)
OSMOLALITY SERPL: 263 MMOL/KG (ref 280–301)
OSMOLALITY UR: 219 MMOL/KG (ref 100–1200)
P AXIS - MUSE: 20 DEGREES
PATH REPORT.ADDENDUM SPEC: NORMAL
PATH REPORT.COMMENTS IMP SPEC: NORMAL
PATH REPORT.FINAL DX SPEC: NORMAL
PATH REPORT.FINAL DX SPEC: NORMAL
PATH REPORT.GROSS SPEC: NORMAL
PATH REPORT.MICROSCOPIC SPEC OTHER STN: NORMAL
PATH REPORT.RELEVANT HX SPEC: NORMAL
PATHOLOGIST PROVIDED ICD10: NORMAL
PH UR STRIP: 5.5 [PH] (ref 5–7)
PHOSPHATE SERPL-MCNC: 1.4 MG/DL (ref 2.5–4.5)
PHOSPHATE SERPL-MCNC: 1.6 MG/DL (ref 2.5–4.5)
PHOSPHATE SERPL-MCNC: 1.7 MG/DL (ref 2.5–4.5)
PHOSPHATE SERPL-MCNC: 2.1 MG/DL (ref 2.5–4.5)
PHOSPHATE SERPL-MCNC: 2.2 MG/DL (ref 2.5–4.5)
PHOSPHATE SERPL-MCNC: 2.2 MG/DL (ref 2.5–4.5)
PHOSPHATE SERPL-MCNC: 2.4 MG/DL (ref 2.5–4.5)
PHOSPHATE SERPL-MCNC: 2.6 MG/DL (ref 2.5–4.5)
PHOSPHATE SERPL-MCNC: 2.9 MG/DL (ref 2.5–4.5)
PHOSPHATE SERPL-MCNC: 3 MG/DL (ref 2.5–4.5)
PHOSPHATE SERPL-MCNC: 3.6 MG/DL (ref 2.5–4.5)
PHOTO IMAGE: NORMAL
PLAT MORPH BLD: ABNORMAL
PLATELET # BLD AUTO: 207 10E3/UL (ref 150–450)
PLATELET # BLD AUTO: 348 10E3/UL (ref 150–450)
PLATELET # BLD AUTO: 353 10E3/UL (ref 150–450)
PLATELET # BLD AUTO: 367 10E3/UL (ref 150–450)
PLATELET # BLD AUTO: 375 10E3/UL (ref 150–450)
PLATELET # BLD AUTO: 376 10E3/UL (ref 150–450)
PLATELET # BLD AUTO: 382 10E3/UL (ref 150–450)
PLATELET # BLD AUTO: 387 10E3/UL (ref 150–450)
PLATELET # BLD AUTO: 390 10E3/UL (ref 150–450)
PLATELET # BLD AUTO: 393 10E3/UL (ref 150–450)
PLATELET # BLD AUTO: 405 10E3/UL (ref 150–450)
PLATELET # BLD AUTO: 421 10E3/UL (ref 150–450)
PLATELET # BLD AUTO: 421 K/UL (ref 140–450)
PLATELET # BLD AUTO: 466 10E3/UL (ref 150–450)
POTASSIUM (EXTERNAL): 4.6 MMOL/L (ref 3.5–5.1)
POTASSIUM BLD-SCNC: 3.8 MMOL/L (ref 3.4–5.3)
POTASSIUM BLD-SCNC: 4.1 MMOL/L (ref 3.4–5.3)
POTASSIUM BLD-SCNC: 4.2 MMOL/L (ref 3.4–5.3)
POTASSIUM BLD-SCNC: 4.3 MMOL/L (ref 3.4–5.3)
POTASSIUM BLD-SCNC: 4.4 MMOL/L (ref 3.4–5.3)
POTASSIUM BLD-SCNC: 4.6 MMOL/L (ref 3.4–5.3)
POTASSIUM BLD-SCNC: 4.6 MMOL/L (ref 3.4–5.3)
POTASSIUM BLD-SCNC: 4.7 MMOL/L (ref 3.4–5.3)
POTASSIUM BLD-SCNC: 4.7 MMOL/L (ref 3.4–5.3)
POTASSIUM SERPL-SCNC: 4.4 MMOL/L (ref 3.5–5.2)
PR INTERVAL - MUSE: 136 MS
PROT SERPL-MCNC: 6.4 G/DL (ref 6.8–8.8)
PROT SERPL-MCNC: 6.5 G/DL (ref 6.8–8.8)
PROT SERPL-MCNC: 6.6 G/DL (ref 6.8–8.8)
PROT SERPL-MCNC: 6.9 G/DL (ref 6.8–8.8)
PROT SERPL-MCNC: 7 G/DL (ref 6–8.5)
PROT SERPL-MCNC: 7.2 G/DL (ref 6.8–8.8)
PSA NG/ML: 0.5 NG/ML (ref 0–4)
QRS DURATION - MUSE: 130 MS
QT - MUSE: 390 MS
QTC - MUSE: 458 MS
R AXIS - MUSE: 47 DEGREES
RBC # BLD AUTO: 2.57 10E6/UL (ref 4.4–5.9)
RBC # BLD AUTO: 2.65 10E6/UL (ref 4.4–5.9)
RBC # BLD AUTO: 2.71 10E6/UL (ref 4.4–5.9)
RBC # BLD AUTO: 2.76 10E6/UL (ref 4.4–5.9)
RBC # BLD AUTO: 2.77 10E6/UL (ref 4.4–5.9)
RBC # BLD AUTO: 2.83 10E6/UL (ref 4.4–5.9)
RBC # BLD AUTO: 2.87 10E6/UL (ref 4.4–5.9)
RBC # BLD AUTO: 2.89 10E6/UL (ref 4.4–5.9)
RBC # BLD AUTO: 2.94 10E6/UL (ref 4.4–5.9)
RBC # BLD AUTO: 2.95 10E6/UL (ref 4.4–5.9)
RBC # BLD AUTO: 3.03 10E6/UL (ref 4.4–5.9)
RBC # BLD AUTO: 3.17 10E6/UL (ref 4.4–5.9)
RBC # BLD AUTO: 3.42 10E6/UL (ref 4.4–5.9)
RBC # BLD AUTO: 4.22 X10/CMM (ref 4.2–5.9)
RBC MORPH BLD: ABNORMAL
RBC URINE: 5 /HPF
RETICS # AUTO: 0.06 10E6/UL (ref 0.03–0.1)
RETICS/RBC NFR AUTO: 2.1 % (ref 0.5–2)
SARS-COV-2 RNA RESP QL NAA+PROBE: NEGATIVE
SCANNED LAB RESULT: NORMAL
SODIUM SERPL-SCNC: 124 MMOL/L (ref 133–144)
SODIUM SERPL-SCNC: 125 MMOL/L (ref 133–144)
SODIUM SERPL-SCNC: 125 MMOL/L (ref 133–144)
SODIUM SERPL-SCNC: 126 MMOL/L (ref 133–144)
SODIUM SERPL-SCNC: 129 MMOL/L (ref 133–144)
SODIUM SERPL-SCNC: 129 MMOL/L (ref 133–144)
SODIUM SERPL-SCNC: 130 MMOL/L (ref 133–144)
SODIUM SERPL-SCNC: 130 MMOL/L (ref 133–144)
SODIUM SERPL-SCNC: 131 MMOL/L (ref 133–144)
SODIUM SERPL-SCNC: 133 MMOL/L (ref 133–144)
SODIUM SERPL-SCNC: 133 MMOL/L (ref 134–144)
SODIUM SERPL-SCNC: 136 MMOL/L (ref 133–144)
SODIUM UR-SCNC: 6 MMOL/L
SP GR UR STRIP: 1.03 (ref 1–1.03)
SPECIMEN EXPIRATION DATE: NORMAL
SQUAMOUS EPITHELIAL: <1 /HPF
SYSTOLIC BLOOD PRESSURE - MUSE: NORMAL MMHG
T AXIS - MUSE: 53 DEGREES
TESTOST SERPL-MCNC: <3 NG/DL (ref 264–916)
TOTAL CO2: 22 MMOL/L (ref 20–29)
TRIGL SERPL-MCNC: 102 MG/DL (ref 0–149)
TROPONIN I SERPL-MCNC: <0.015 UG/L (ref 0–0.04)
UNIT ABO/RH: NORMAL
UNIT NUMBER: NORMAL
UNIT STATUS: NORMAL
UNIT TYPE ISBT: 7300
URATE SERPL-MCNC: 6.1 MG/DL (ref 3.5–7.2)
URATE SERPL-MCNC: 6.1 MG/DL (ref 3.5–7.2)
URATE SERPL-MCNC: 6.3 MG/DL (ref 3.5–7.2)
URATE SERPL-MCNC: 6.9 MG/DL (ref 3.5–7.2)
UROBILINOGEN UR STRIP-MCNC: NORMAL MG/DL
VENTRICULAR RATE- MUSE: 83 BPM
VLDLC SERPL CALC-MCNC: 18 MG/DL (ref 5–40)
WBC # BLD AUTO: 10.7 X10/CMM (ref 3.8–11)
WBC # BLD AUTO: 13.7 10E3/UL (ref 4–11)
WBC # BLD AUTO: 14.5 10E3/UL (ref 4–11)
WBC # BLD AUTO: 14.9 10E3/UL (ref 4–11)
WBC # BLD AUTO: 15.2 10E3/UL (ref 4–11)
WBC # BLD AUTO: 15.7 10E3/UL (ref 4–11)
WBC # BLD AUTO: 16.4 10E3/UL (ref 4–11)
WBC # BLD AUTO: 17.5 10E3/UL (ref 4–11)
WBC # BLD AUTO: 18.7 10E3/UL (ref 4–11)
WBC # BLD AUTO: 19 10E3/UL (ref 4–11)
WBC # BLD AUTO: 19 10E3/UL (ref 4–11)
WBC # BLD AUTO: 19.5 10E3/UL (ref 4–11)
WBC # BLD AUTO: 21.2 10E3/UL (ref 4–11)
WBC # BLD AUTO: 21.2 10E3/UL (ref 4–11)
WBC # BLD AUTO: 22.4 10E3/UL (ref 4–11)
WBC # BLD AUTO: 44.8 10E3/UL (ref 4–11)
WBC URINE: 22 /HPF

## 2021-01-01 PROCEDURE — 250N000013 HC RX MED GY IP 250 OP 250 PS 637: Performed by: UROLOGY

## 2021-01-01 PROCEDURE — 120N000001 HC R&B MED SURG/OB

## 2021-01-01 PROCEDURE — 258N000003 HC RX IP 258 OP 636: Performed by: ANESTHESIOLOGY

## 2021-01-01 PROCEDURE — 250N000013 HC RX MED GY IP 250 OP 250 PS 637: Performed by: HOSPITALIST

## 2021-01-01 PROCEDURE — 258N000003 HC RX IP 258 OP 636: Performed by: INTERNAL MEDICINE

## 2021-01-01 PROCEDURE — 250N000013 HC RX MED GY IP 250 OP 250 PS 637: Performed by: INTERNAL MEDICINE

## 2021-01-01 PROCEDURE — 36415 COLL VENOUS BLD VENIPUNCTURE: CPT

## 2021-01-01 PROCEDURE — 99285 EMERGENCY DEPT VISIT HI MDM: CPT | Mod: 25

## 2021-01-01 PROCEDURE — 36415 COLL VENOUS BLD VENIPUNCTURE: CPT | Performed by: HOSPITALIST

## 2021-01-01 PROCEDURE — G0008 ADMIN INFLUENZA VIRUS VAC: HCPCS | Mod: 59 | Performed by: FAMILY MEDICINE

## 2021-01-01 PROCEDURE — 80048 BASIC METABOLIC PNL TOTAL CA: CPT | Performed by: HOSPITALIST

## 2021-01-01 PROCEDURE — 99214 OFFICE O/P EST MOD 30 MIN: CPT | Performed by: FAMILY MEDICINE

## 2021-01-01 PROCEDURE — 99233 SBSQ HOSP IP/OBS HIGH 50: CPT | Performed by: INTERNAL MEDICINE

## 2021-01-01 PROCEDURE — 83735 ASSAY OF MAGNESIUM: CPT | Performed by: INTERNAL MEDICINE

## 2021-01-01 PROCEDURE — 91301 PR COVID VAC MODERNA 100 MCG/0.5 ML IM: CPT | Performed by: FAMILY MEDICINE

## 2021-01-01 PROCEDURE — 52332 CYSTOSCOPY AND TREATMENT: CPT | Mod: 50 | Performed by: UROLOGY

## 2021-01-01 PROCEDURE — 99214 OFFICE O/P EST MOD 30 MIN: CPT | Mod: GT | Performed by: FAMILY MEDICINE

## 2021-01-01 PROCEDURE — 82248 BILIRUBIN DIRECT: CPT | Performed by: EMERGENCY MEDICINE

## 2021-01-01 PROCEDURE — 360N000082 HC SURGERY LEVEL 2 W/ FLUORO, PER MIN: Performed by: UROLOGY

## 2021-01-01 PROCEDURE — 74176 CT ABD & PELVIS W/O CONTRAST: CPT

## 2021-01-01 PROCEDURE — 250N000011 HC RX IP 250 OP 636: Performed by: STUDENT IN AN ORGANIZED HEALTH CARE EDUCATION/TRAINING PROGRAM

## 2021-01-01 PROCEDURE — 999N000127 HC STATISTIC PERIPHERAL IV START W US GUIDANCE

## 2021-01-01 PROCEDURE — 84100 ASSAY OF PHOSPHORUS: CPT | Performed by: HOSPITALIST

## 2021-01-01 PROCEDURE — 85027 COMPLETE CBC AUTOMATED: CPT | Performed by: HOSPITALIST

## 2021-01-01 PROCEDURE — 99232 SBSQ HOSP IP/OBS MODERATE 35: CPT | Performed by: INTERNAL MEDICINE

## 2021-01-01 PROCEDURE — 250N000009 HC RX 250: Performed by: INTERNAL MEDICINE

## 2021-01-01 PROCEDURE — 370N000017 HC ANESTHESIA TECHNICAL FEE, PER MIN: Performed by: UROLOGY

## 2021-01-01 PROCEDURE — 82330 ASSAY OF CALCIUM: CPT | Performed by: INTERNAL MEDICINE

## 2021-01-01 PROCEDURE — 99239 HOSP IP/OBS DSCHRG MGMT >30: CPT | Performed by: INTERNAL MEDICINE

## 2021-01-01 PROCEDURE — U0003 INFECTIOUS AGENT DETECTION BY NUCLEIC ACID (DNA OR RNA); SEVERE ACUTE RESPIRATORY SYNDROME CORONAVIRUS 2 (SARS-COV-2) (CORONAVIRUS DISEASE [COVID-19]), AMPLIFIED PROBE TECHNIQUE, MAKING USE OF HIGH THROUGHPUT TECHNOLOGIES AS DESCRIBED BY CMS-2020-01-R: HCPCS

## 2021-01-01 PROCEDURE — 36415 COLL VENOUS BLD VENIPUNCTURE: CPT | Performed by: INTERNAL MEDICINE

## 2021-01-01 PROCEDURE — C1788 PORT, INDWELLING, IMP: HCPCS | Performed by: THORACIC SURGERY (CARDIOTHORACIC VASCULAR SURGERY)

## 2021-01-01 PROCEDURE — 97110 THERAPEUTIC EXERCISES: CPT | Mod: GP | Performed by: PHYSICAL THERAPIST

## 2021-01-01 PROCEDURE — 87086 URINE CULTURE/COLONY COUNT: CPT | Performed by: EMERGENCY MEDICINE

## 2021-01-01 PROCEDURE — 88305 TISSUE EXAM BY PATHOLOGIST: CPT | Performed by: FAMILY MEDICINE

## 2021-01-01 PROCEDURE — 84550 ASSAY OF BLOOD/URIC ACID: CPT | Performed by: INTERNAL MEDICINE

## 2021-01-01 PROCEDURE — 710N000009 HC RECOVERY PHASE 1, LEVEL 1, PER MIN: Performed by: UROLOGY

## 2021-01-01 PROCEDURE — 250N000011 HC RX IP 250 OP 636: Performed by: INTERNAL MEDICINE

## 2021-01-01 PROCEDURE — 250N000011 HC RX IP 250 OP 636: Performed by: EMERGENCY MEDICINE

## 2021-01-01 PROCEDURE — 250N000009 HC RX 250: Performed by: NURSE ANESTHETIST, CERTIFIED REGISTERED

## 2021-01-01 PROCEDURE — 80069 RENAL FUNCTION PANEL: CPT | Performed by: INTERNAL MEDICINE

## 2021-01-01 PROCEDURE — 80048 BASIC METABOLIC PNL TOTAL CA: CPT | Performed by: INTERNAL MEDICINE

## 2021-01-01 PROCEDURE — 710N000009 HC RECOVERY PHASE 1, LEVEL 1, PER MIN: Performed by: THORACIC SURGERY (CARDIOTHORACIC VASCULAR SURGERY)

## 2021-01-01 PROCEDURE — 83930 ASSAY OF BLOOD OSMOLALITY: CPT | Performed by: INTERNAL MEDICINE

## 2021-01-01 PROCEDURE — 258N000003 HC RX IP 258 OP 636: Performed by: HOSPITALIST

## 2021-01-01 PROCEDURE — 91301 COVID-19,PF,MODERNA: CPT | Performed by: FAMILY MEDICINE

## 2021-01-01 PROCEDURE — 250N000009 HC RX 250: Performed by: RADIOLOGY

## 2021-01-01 PROCEDURE — 84300 ASSAY OF URINE SODIUM: CPT | Performed by: INTERNAL MEDICINE

## 2021-01-01 PROCEDURE — 99231 SBSQ HOSP IP/OBS SF/LOW 25: CPT | Performed by: PHYSICIAN ASSISTANT

## 2021-01-01 PROCEDURE — 255N000002 HC RX 255 OP 636: Performed by: UROLOGY

## 2021-01-01 PROCEDURE — 250N000012 HC RX MED GY IP 250 OP 636 PS 637: Performed by: UROLOGY

## 2021-01-01 PROCEDURE — 99232 SBSQ HOSP IP/OBS MODERATE 35: CPT | Performed by: NURSE PRACTITIONER

## 2021-01-01 PROCEDURE — 85027 COMPLETE CBC AUTOMATED: CPT | Performed by: INTERNAL MEDICINE

## 2021-01-01 PROCEDURE — 85045 AUTOMATED RETICULOCYTE COUNT: CPT | Performed by: INTERNAL MEDICINE

## 2021-01-01 PROCEDURE — 999N000141 HC STATISTIC PRE-PROCEDURE NURSING ASSESSMENT: Performed by: THORACIC SURGERY (CARDIOTHORACIC VASCULAR SURGERY)

## 2021-01-01 PROCEDURE — 83690 ASSAY OF LIPASE: CPT | Performed by: EMERGENCY MEDICINE

## 2021-01-01 PROCEDURE — 97162 PT EVAL MOD COMPLEX 30 MIN: CPT | Mod: GP

## 2021-01-01 PROCEDURE — 250N000013 HC RX MED GY IP 250 OP 250 PS 637: Performed by: NURSE PRACTITIONER

## 2021-01-01 PROCEDURE — 83615 LACTATE (LD) (LDH) ENZYME: CPT | Performed by: INTERNAL MEDICINE

## 2021-01-01 PROCEDURE — U0005 INFEC AGEN DETEC AMPLI PROBE: HCPCS

## 2021-01-01 PROCEDURE — 250N000011 HC RX IP 250 OP 636: Performed by: HOSPITALIST

## 2021-01-01 PROCEDURE — 97530 THERAPEUTIC ACTIVITIES: CPT | Mod: GP

## 2021-01-01 PROCEDURE — 250N000012 HC RX MED GY IP 250 OP 636 PS 637: Performed by: INTERNAL MEDICINE

## 2021-01-01 PROCEDURE — 74018 RADEX ABDOMEN 1 VIEW: CPT

## 2021-01-01 PROCEDURE — 84100 ASSAY OF PHOSPHORUS: CPT | Performed by: INTERNAL MEDICINE

## 2021-01-01 PROCEDURE — 258N000003 HC RX IP 258 OP 636: Performed by: NURSE ANESTHETIST, CERTIFIED REGISTERED

## 2021-01-01 PROCEDURE — 84100 ASSAY OF PHOSPHORUS: CPT | Performed by: UROLOGY

## 2021-01-01 PROCEDURE — 258N000003 HC RX IP 258 OP 636: Performed by: EMERGENCY MEDICINE

## 2021-01-01 PROCEDURE — 99221 1ST HOSP IP/OBS SF/LOW 40: CPT | Performed by: STUDENT IN AN ORGANIZED HEALTH CARE EDUCATION/TRAINING PROGRAM

## 2021-01-01 PROCEDURE — 36415 COLL VENOUS BLD VENIPUNCTURE: CPT | Performed by: FAMILY MEDICINE

## 2021-01-01 PROCEDURE — 88342 IMHCHEM/IMCYTCHM 1ST ANTB: CPT | Mod: 26 | Performed by: PATHOLOGY

## 2021-01-01 PROCEDURE — 360N000082 HC SURGERY LEVEL 2 W/ FLUORO, PER MIN: Performed by: THORACIC SURGERY (CARDIOTHORACIC VASCULAR SURGERY)

## 2021-01-01 PROCEDURE — 88342 IMHCHEM/IMCYTCHM 1ST ANTB: CPT | Mod: TC | Performed by: INTERNAL MEDICINE

## 2021-01-01 PROCEDURE — 0011A PR COVID VAC MODERNA 100 MCG/0.5 ML IM: CPT | Performed by: FAMILY MEDICINE

## 2021-01-01 PROCEDURE — 36415 COLL VENOUS BLD VENIPUNCTURE: CPT | Performed by: EMERGENCY MEDICINE

## 2021-01-01 PROCEDURE — 88360 TUMOR IMMUNOHISTOCHEM/MANUAL: CPT | Mod: 26 | Performed by: PATHOLOGY

## 2021-01-01 PROCEDURE — 97116 GAIT TRAINING THERAPY: CPT | Mod: GP

## 2021-01-01 PROCEDURE — 97116 GAIT TRAINING THERAPY: CPT | Mod: GP | Performed by: PHYSICAL THERAPIST

## 2021-01-01 PROCEDURE — 83605 ASSAY OF LACTIC ACID: CPT | Performed by: EMERGENCY MEDICINE

## 2021-01-01 PROCEDURE — 250N000013 HC RX MED GY IP 250 OP 250 PS 637: Performed by: EMERGENCY MEDICINE

## 2021-01-01 PROCEDURE — 99232 SBSQ HOSP IP/OBS MODERATE 35: CPT | Performed by: PHYSICIAN ASSISTANT

## 2021-01-01 PROCEDURE — P9016 RBC LEUKOCYTES REDUCED: HCPCS | Performed by: INTERNAL MEDICINE

## 2021-01-01 PROCEDURE — 370N000017 HC ANESTHESIA TECHNICAL FEE, PER MIN: Performed by: THORACIC SURGERY (CARDIOTHORACIC VASCULAR SURGERY)

## 2021-01-01 PROCEDURE — C2617 STENT, NON-COR, TEM W/O DEL: HCPCS | Performed by: UROLOGY

## 2021-01-01 PROCEDURE — 11601 EXC TR-EXT MAL+MARG 0.6-1 CM: CPT | Performed by: FAMILY MEDICINE

## 2021-01-01 PROCEDURE — 710N000012 HC RECOVERY PHASE 2, PER MINUTE: Performed by: THORACIC SURGERY (CARDIOTHORACIC VASCULAR SURGERY)

## 2021-01-01 PROCEDURE — 87635 SARS-COV-2 COVID-19 AMP PRB: CPT | Performed by: EMERGENCY MEDICINE

## 2021-01-01 PROCEDURE — 88305 TISSUE EXAM BY PATHOLOGIST: CPT | Mod: 26 | Performed by: PATHOLOGY

## 2021-01-01 PROCEDURE — 99222 1ST HOSP IP/OBS MODERATE 55: CPT | Performed by: NURSE PRACTITIONER

## 2021-01-01 PROCEDURE — 250N000011 HC RX IP 250 OP 636: Performed by: NURSE ANESTHETIST, CERTIFIED REGISTERED

## 2021-01-01 PROCEDURE — 88341 IMHCHEM/IMCYTCHM EA ADD ANTB: CPT | Mod: 26 | Performed by: PATHOLOGY

## 2021-01-01 PROCEDURE — 999N000179 XR SURGERY CARM FLUORO LESS THAN 5 MIN W STILLS: Mod: TC

## 2021-01-01 PROCEDURE — 99232 SBSQ HOSP IP/OBS MODERATE 35: CPT | Performed by: UROLOGY

## 2021-01-01 PROCEDURE — 80048 BASIC METABOLIC PNL TOTAL CA: CPT | Performed by: EMERGENCY MEDICINE

## 2021-01-01 PROCEDURE — 85027 COMPLETE CBC AUTOMATED: CPT | Performed by: EMERGENCY MEDICINE

## 2021-01-01 PROCEDURE — 85014 HEMATOCRIT: CPT | Performed by: HOSPITALIST

## 2021-01-01 PROCEDURE — 86923 COMPATIBILITY TEST ELECTRIC: CPT | Performed by: INTERNAL MEDICINE

## 2021-01-01 PROCEDURE — 999N000141 HC STATISTIC PRE-PROCEDURE NURSING ASSESSMENT: Performed by: UROLOGY

## 2021-01-01 PROCEDURE — 83935 ASSAY OF URINE OSMOLALITY: CPT | Performed by: INTERNAL MEDICINE

## 2021-01-01 PROCEDURE — 272N000001 HC OR GENERAL SUPPLY STERILE: Performed by: UROLOGY

## 2021-01-01 PROCEDURE — C9803 HOPD COVID-19 SPEC COLLECT: HCPCS

## 2021-01-01 PROCEDURE — 0T788DZ DILATION OF BILATERAL URETERS WITH INTRALUMINAL DEVICE, VIA NATURAL OR ARTIFICIAL OPENING ENDOSCOPIC: ICD-10-PCS | Performed by: UROLOGY

## 2021-01-01 PROCEDURE — 93005 ELECTROCARDIOGRAM TRACING: CPT

## 2021-01-01 PROCEDURE — 999N000154 HC STATISTIC RADIOLOGY XRAY, US, CT, MAR, NM

## 2021-01-01 PROCEDURE — 99222 1ST HOSP IP/OBS MODERATE 55: CPT | Performed by: INTERNAL MEDICINE

## 2021-01-01 PROCEDURE — 99215 OFFICE O/P EST HI 40 MIN: CPT | Mod: 25 | Performed by: FAMILY MEDICINE

## 2021-01-01 PROCEDURE — 272N000001 HC OR GENERAL SUPPLY STERILE: Performed by: THORACIC SURGERY (CARDIOTHORACIC VASCULAR SURGERY)

## 2021-01-01 PROCEDURE — 99233 SBSQ HOSP IP/OBS HIGH 50: CPT | Performed by: HOSPITALIST

## 2021-01-01 PROCEDURE — 85025 COMPLETE CBC W/AUTO DIFF WBC: CPT | Performed by: FAMILY MEDICINE

## 2021-01-01 PROCEDURE — 82565 ASSAY OF CREATININE: CPT

## 2021-01-01 PROCEDURE — 250N000009 HC RX 250: Performed by: EMERGENCY MEDICINE

## 2021-01-01 PROCEDURE — 71046 X-RAY EXAM CHEST 2 VIEWS: CPT

## 2021-01-01 PROCEDURE — 71260 CT THORAX DX C+: CPT

## 2021-01-01 PROCEDURE — 999N000063 XR CHEST PORT 1 VIEW

## 2021-01-01 PROCEDURE — 82040 ASSAY OF SERUM ALBUMIN: CPT | Performed by: INTERNAL MEDICINE

## 2021-01-01 PROCEDURE — 97530 THERAPEUTIC ACTIVITIES: CPT | Mod: GP | Performed by: PHYSICAL THERAPIST

## 2021-01-01 PROCEDURE — 83605 ASSAY OF LACTIC ACID: CPT | Performed by: HOSPITALIST

## 2021-01-01 PROCEDURE — 85060 BLOOD SMEAR INTERPRETATION: CPT | Performed by: PATHOLOGY

## 2021-01-01 PROCEDURE — 250N000009 HC RX 250: Performed by: UROLOGY

## 2021-01-01 PROCEDURE — 82247 BILIRUBIN TOTAL: CPT | Performed by: INTERNAL MEDICINE

## 2021-01-01 PROCEDURE — 250N000009 HC RX 250: Performed by: THORACIC SURGERY (CARDIOTHORACIC VASCULAR SURGERY)

## 2021-01-01 PROCEDURE — 74420 UROGRAPHY RTRGR +-KUB: CPT | Mod: 26 | Performed by: UROLOGY

## 2021-01-01 PROCEDURE — 82310 ASSAY OF CALCIUM: CPT | Performed by: INTERNAL MEDICINE

## 2021-01-01 PROCEDURE — 0WBH3ZX EXCISION OF RETROPERITONEUM, PERCUTANEOUS APPROACH, DIAGNOSTIC: ICD-10-PCS | Performed by: RADIOLOGY

## 2021-01-01 PROCEDURE — 36430 TRANSFUSION BLD/BLD COMPNT: CPT

## 2021-01-01 PROCEDURE — 85610 PROTHROMBIN TIME: CPT | Performed by: RADIOLOGY

## 2021-01-01 PROCEDURE — 272N000431 CT ABDOMEN RETROPERITONEAL BIOPSY

## 2021-01-01 PROCEDURE — 96365 THER/PROPH/DIAG IV INF INIT: CPT | Mod: 59

## 2021-01-01 PROCEDURE — 36415 COLL VENOUS BLD VENIPUNCTURE: CPT | Performed by: UROLOGY

## 2021-01-01 PROCEDURE — 250N000011 HC RX IP 250 OP 636

## 2021-01-01 PROCEDURE — 71275 CT ANGIOGRAPHY CHEST: CPT

## 2021-01-01 PROCEDURE — 99223 1ST HOSP IP/OBS HIGH 75: CPT | Mod: AI | Performed by: INTERNAL MEDICINE

## 2021-01-01 PROCEDURE — 84484 ASSAY OF TROPONIN QUANT: CPT | Performed by: EMERGENCY MEDICINE

## 2021-01-01 PROCEDURE — 81001 URINALYSIS AUTO W/SCOPE: CPT | Performed by: EMERGENCY MEDICINE

## 2021-01-01 PROCEDURE — 87635 SARS-COV-2 COVID-19 AMP PRB: CPT | Performed by: HOSPITALIST

## 2021-01-01 PROCEDURE — 0012A COVID-19,PF,MODERNA: CPT | Performed by: FAMILY MEDICINE

## 2021-01-01 PROCEDURE — 250N000011 HC RX IP 250 OP 636: Performed by: THORACIC SURGERY (CARDIOTHORACIC VASCULAR SURGERY)

## 2021-01-01 PROCEDURE — 77012 CT SCAN FOR NEEDLE BIOPSY: CPT

## 2021-01-01 PROCEDURE — 90662 IIV NO PRSV INCREASED AG IM: CPT | Performed by: FAMILY MEDICINE

## 2021-01-01 PROCEDURE — 86900 BLOOD TYPING SEROLOGIC ABO: CPT

## 2021-01-01 PROCEDURE — 99207 PR CONSULT E&M CHANGED TO INITIAL LEVEL: CPT | Performed by: NURSE PRACTITIONER

## 2021-01-01 PROCEDURE — 83880 ASSAY OF NATRIURETIC PEPTIDE: CPT | Performed by: EMERGENCY MEDICINE

## 2021-01-01 DEVICE — CATH PORT POWERPORT CLEARVUE ISP 8FR 5608062: Type: IMPLANTABLE DEVICE | Site: NECK | Status: FUNCTIONAL

## 2021-01-01 DEVICE — STENT URETERAL POLARIS ULTRA 7FRX24CM M0061921420: Type: IMPLANTABLE DEVICE | Site: URETER | Status: FUNCTIONAL

## 2021-01-01 RX ORDER — HYDRALAZINE HYDROCHLORIDE 20 MG/ML
2.5-5 INJECTION INTRAMUSCULAR; INTRAVENOUS EVERY 10 MIN PRN
Status: DISCONTINUED | OUTPATIENT
Start: 2021-01-01 | End: 2021-01-01 | Stop reason: HOSPADM

## 2021-01-01 RX ORDER — ONDANSETRON 4 MG/1
4 TABLET, ORALLY DISINTEGRATING ORAL DAILY PRN
COMMUNITY

## 2021-01-01 RX ORDER — FENTANYL CITRATE 50 UG/ML
25-50 INJECTION, SOLUTION INTRAMUSCULAR; INTRAVENOUS EVERY 5 MIN PRN
Status: DISCONTINUED | OUTPATIENT
Start: 2021-01-01 | End: 2021-01-01

## 2021-01-01 RX ORDER — OXYCODONE HYDROCHLORIDE 5 MG/1
5 TABLET ORAL EVERY 4 HOURS PRN
Status: DISCONTINUED | OUTPATIENT
Start: 2021-01-01 | End: 2021-01-01

## 2021-01-01 RX ORDER — ONDANSETRON 4 MG/1
16 TABLET, ORALLY DISINTEGRATING ORAL ONCE
Status: COMPLETED | OUTPATIENT
Start: 2021-01-01 | End: 2021-01-01

## 2021-01-01 RX ORDER — ONDANSETRON 2 MG/ML
4 INJECTION INTRAMUSCULAR; INTRAVENOUS EVERY 6 HOURS PRN
Status: DISCONTINUED | OUTPATIENT
Start: 2021-01-01 | End: 2021-01-01 | Stop reason: HOSPADM

## 2021-01-01 RX ORDER — NALOXONE HYDROCHLORIDE 0.4 MG/ML
0.4 INJECTION, SOLUTION INTRAMUSCULAR; INTRAVENOUS; SUBCUTANEOUS
Status: DISCONTINUED | OUTPATIENT
Start: 2021-01-01 | End: 2021-01-01 | Stop reason: HOSPADM

## 2021-01-01 RX ORDER — SODIUM CHLORIDE, SODIUM LACTATE, POTASSIUM CHLORIDE, CALCIUM CHLORIDE 600; 310; 30; 20 MG/100ML; MG/100ML; MG/100ML; MG/100ML
INJECTION, SOLUTION INTRAVENOUS CONTINUOUS
Status: DISCONTINUED | OUTPATIENT
Start: 2021-01-01 | End: 2021-01-01 | Stop reason: HOSPADM

## 2021-01-01 RX ORDER — IOPAMIDOL 755 MG/ML
72 INJECTION, SOLUTION INTRAVASCULAR ONCE
Status: COMPLETED | OUTPATIENT
Start: 2021-01-01 | End: 2021-01-01

## 2021-01-01 RX ORDER — IOPAMIDOL 755 MG/ML
92 INJECTION, SOLUTION INTRAVASCULAR ONCE
Status: COMPLETED | OUTPATIENT
Start: 2021-01-01 | End: 2021-01-01

## 2021-01-01 RX ORDER — LIDOCAINE 40 MG/G
CREAM TOPICAL
Status: DISCONTINUED | OUTPATIENT
Start: 2021-01-01 | End: 2021-01-01

## 2021-01-01 RX ORDER — FUROSEMIDE 10 MG/ML
20 INJECTION INTRAMUSCULAR; INTRAVENOUS ONCE
Status: COMPLETED | OUTPATIENT
Start: 2021-01-01 | End: 2021-01-01

## 2021-01-01 RX ORDER — TRAZODONE HYDROCHLORIDE 50 MG/1
50 TABLET, FILM COATED ORAL AT BEDTIME
Status: DISCONTINUED | OUTPATIENT
Start: 2021-01-01 | End: 2021-01-01 | Stop reason: HOSPADM

## 2021-01-01 RX ORDER — LORAZEPAM 2 MG/ML
.5-1 INJECTION INTRAMUSCULAR EVERY 6 HOURS PRN
Status: DISCONTINUED | OUTPATIENT
Start: 2021-01-01 | End: 2021-01-01 | Stop reason: HOSPADM

## 2021-01-01 RX ORDER — TRAZODONE HYDROCHLORIDE 50 MG/1
TABLET, FILM COATED ORAL
COMMUNITY
End: 2021-01-01

## 2021-01-01 RX ORDER — FENTANYL CITRATE 0.05 MG/ML
50 INJECTION, SOLUTION INTRAMUSCULAR; INTRAVENOUS EVERY 5 MIN PRN
Status: DISCONTINUED | OUTPATIENT
Start: 2021-01-01 | End: 2021-01-01 | Stop reason: HOSPADM

## 2021-01-01 RX ORDER — LIDOCAINE HYDROCHLORIDE 10 MG/ML
INJECTION, SOLUTION INFILTRATION; PERINEURAL
Status: DISCONTINUED
Start: 2021-01-01 | End: 2021-01-01 | Stop reason: HOSPADM

## 2021-01-01 RX ORDER — ONDANSETRON 4 MG/1
4 TABLET, ORALLY DISINTEGRATING ORAL EVERY 30 MIN PRN
Status: DISCONTINUED | OUTPATIENT
Start: 2021-01-01 | End: 2021-01-01 | Stop reason: HOSPADM

## 2021-01-01 RX ORDER — LIDOCAINE HYDROCHLORIDE 10 MG/ML
INJECTION, SOLUTION INFILTRATION; PERINEURAL PRN
Status: DISCONTINUED | OUTPATIENT
Start: 2021-01-01 | End: 2021-01-01

## 2021-01-01 RX ORDER — LIDOCAINE HYDROCHLORIDE 20 MG/ML
INJECTION, SOLUTION INFILTRATION; PERINEURAL PRN
Status: DISCONTINUED | OUTPATIENT
Start: 2021-01-01 | End: 2021-01-01

## 2021-01-01 RX ORDER — PROPOFOL 10 MG/ML
INJECTION, EMULSION INTRAVENOUS PRN
Status: DISCONTINUED | OUTPATIENT
Start: 2021-01-01 | End: 2021-01-01

## 2021-01-01 RX ORDER — LIDOCAINE HYDROCHLORIDE 10 MG/ML
INJECTION, SOLUTION INFILTRATION; PERINEURAL PRN
Status: DISCONTINUED | OUTPATIENT
Start: 2021-01-01 | End: 2021-01-01 | Stop reason: HOSPADM

## 2021-01-01 RX ORDER — PREDNISONE 5 MG/1
5 TABLET ORAL 2 TIMES DAILY WITH MEALS
Status: DISCONTINUED | OUTPATIENT
Start: 2021-01-01 | End: 2021-01-01

## 2021-01-01 RX ORDER — NALOXONE HYDROCHLORIDE 0.4 MG/ML
0.2 INJECTION, SOLUTION INTRAMUSCULAR; INTRAVENOUS; SUBCUTANEOUS
Status: DISCONTINUED | OUTPATIENT
Start: 2021-01-01 | End: 2021-01-01 | Stop reason: HOSPADM

## 2021-01-01 RX ORDER — HEPARIN SODIUM (PORCINE) LOCK FLUSH IV SOLN 100 UNIT/ML 100 UNIT/ML
SOLUTION INTRAVENOUS
Status: DISCONTINUED
Start: 2021-01-01 | End: 2021-01-01 | Stop reason: HOSPADM

## 2021-01-01 RX ORDER — HEPARIN SODIUM,PORCINE 10 UNIT/ML
5 VIAL (ML) INTRAVENOUS
Status: CANCELLED | OUTPATIENT
Start: 2021-01-01

## 2021-01-01 RX ORDER — BISACODYL 10 MG
10 SUPPOSITORY, RECTAL RECTAL DAILY
Status: DISCONTINUED | OUTPATIENT
Start: 2021-01-01 | End: 2021-01-01

## 2021-01-01 RX ORDER — SENNOSIDES 8.6 MG
1-2 TABLET ORAL 2 TIMES DAILY
Status: DISCONTINUED | OUTPATIENT
Start: 2021-01-01 | End: 2021-01-01

## 2021-01-01 RX ORDER — SODIUM CHLORIDE 9 MG/ML
INJECTION, SOLUTION INTRAVENOUS CONTINUOUS
Status: DISCONTINUED | OUTPATIENT
Start: 2021-01-01 | End: 2021-01-01 | Stop reason: HOSPADM

## 2021-01-01 RX ORDER — LIDOCAINE 40 MG/G
CREAM TOPICAL
Status: DISCONTINUED | OUTPATIENT
Start: 2021-01-01 | End: 2021-01-01 | Stop reason: HOSPADM

## 2021-01-01 RX ORDER — FENTANYL CITRATE 0.05 MG/ML
25 INJECTION, SOLUTION INTRAMUSCULAR; INTRAVENOUS EVERY 5 MIN PRN
Status: DISCONTINUED | OUTPATIENT
Start: 2021-01-01 | End: 2021-01-01 | Stop reason: HOSPADM

## 2021-01-01 RX ORDER — ALBUTEROL SULFATE 0.83 MG/ML
2.5 SOLUTION RESPIRATORY (INHALATION)
Status: DISCONTINUED | OUTPATIENT
Start: 2021-01-01 | End: 2021-01-01 | Stop reason: HOSPADM

## 2021-01-01 RX ORDER — PROCHLORPERAZINE MALEATE 5 MG
5 TABLET ORAL EVERY 6 HOURS PRN
Status: DISCONTINUED | OUTPATIENT
Start: 2021-01-01 | End: 2021-01-01 | Stop reason: HOSPADM

## 2021-01-01 RX ORDER — POLYETHYLENE GLYCOL 3350 17 G/17G
1 POWDER, FOR SOLUTION ORAL DAILY
COMMUNITY

## 2021-01-01 RX ORDER — FUROSEMIDE 10 MG/ML
80 INJECTION INTRAMUSCULAR; INTRAVENOUS ONCE
Status: DISCONTINUED | OUTPATIENT
Start: 2021-01-01 | End: 2021-01-01 | Stop reason: HOSPADM

## 2021-01-01 RX ORDER — HYDROMORPHONE HCL IN WATER/PF 6 MG/30 ML
0.2 PATIENT CONTROLLED ANALGESIA SYRINGE INTRAVENOUS EVERY 5 MIN PRN
Status: DISCONTINUED | OUTPATIENT
Start: 2021-01-01 | End: 2021-01-01 | Stop reason: HOSPADM

## 2021-01-01 RX ORDER — CEFAZOLIN SODIUM 2 G/100ML
2 INJECTION, SOLUTION INTRAVENOUS SEE ADMIN INSTRUCTIONS
Status: DISCONTINUED | OUTPATIENT
Start: 2021-01-01 | End: 2021-01-01

## 2021-01-01 RX ORDER — CEFAZOLIN SODIUM/WATER 2 G/20 ML
2 SYRINGE (ML) INTRAVENOUS
Status: COMPLETED | OUTPATIENT
Start: 2021-01-01 | End: 2021-01-01

## 2021-01-01 RX ORDER — SIMETHICONE 80 MG
160 TABLET,CHEWABLE ORAL 4 TIMES DAILY PRN
Status: DISCONTINUED | OUTPATIENT
Start: 2021-01-01 | End: 2021-01-01 | Stop reason: HOSPADM

## 2021-01-01 RX ORDER — OXYCODONE HYDROCHLORIDE 5 MG/1
5 TABLET ORAL EVERY 4 HOURS PRN
Status: DISCONTINUED | OUTPATIENT
Start: 2021-01-01 | End: 2021-01-01 | Stop reason: HOSPADM

## 2021-01-01 RX ORDER — LABETALOL HYDROCHLORIDE 5 MG/ML
10 INJECTION, SOLUTION INTRAVENOUS
Status: DISCONTINUED | OUTPATIENT
Start: 2021-01-01 | End: 2021-01-01 | Stop reason: HOSPADM

## 2021-01-01 RX ORDER — FUROSEMIDE 20 MG
20 TABLET ORAL DAILY
Qty: 30 TABLET | Refills: 0 | Status: SHIPPED | OUTPATIENT
Start: 2021-01-01

## 2021-01-01 RX ORDER — ACETAMINOPHEN 650 MG/1
650 SUPPOSITORY RECTAL EVERY 6 HOURS PRN
Status: DISCONTINUED | OUTPATIENT
Start: 2021-01-01 | End: 2021-01-01 | Stop reason: HOSPADM

## 2021-01-01 RX ORDER — ACETAMINOPHEN 325 MG/1
650 TABLET ORAL EVERY 6 HOURS PRN
Status: DISCONTINUED | OUTPATIENT
Start: 2021-01-01 | End: 2021-01-01 | Stop reason: HOSPADM

## 2021-01-01 RX ORDER — ACETAMINOPHEN 325 MG/1
650 TABLET ORAL
Status: DISCONTINUED | OUTPATIENT
Start: 2021-01-01 | End: 2021-01-01 | Stop reason: HOSPADM

## 2021-01-01 RX ORDER — LORAZEPAM 0.5 MG/1
.5-1 TABLET ORAL EVERY 6 HOURS PRN
Status: DISCONTINUED | OUTPATIENT
Start: 2021-01-01 | End: 2021-01-01 | Stop reason: HOSPADM

## 2021-01-01 RX ORDER — BISACODYL 10 MG
10 SUPPOSITORY, RECTAL RECTAL DAILY PRN
Status: DISCONTINUED | OUTPATIENT
Start: 2021-01-01 | End: 2021-01-01 | Stop reason: HOSPADM

## 2021-01-01 RX ORDER — MEPERIDINE HYDROCHLORIDE 25 MG/ML
12.5 INJECTION INTRAMUSCULAR; INTRAVENOUS; SUBCUTANEOUS
Status: DISCONTINUED | OUTPATIENT
Start: 2021-01-01 | End: 2021-01-01 | Stop reason: HOSPADM

## 2021-01-01 RX ORDER — ONDANSETRON 2 MG/ML
4 INJECTION INTRAMUSCULAR; INTRAVENOUS EVERY 30 MIN PRN
Status: DISCONTINUED | OUTPATIENT
Start: 2021-01-01 | End: 2021-01-01 | Stop reason: HOSPADM

## 2021-01-01 RX ORDER — LANOLIN ALCOHOL/MO/W.PET/CERES
3 CREAM (GRAM) TOPICAL
Status: DISCONTINUED | OUTPATIENT
Start: 2021-01-01 | End: 2021-01-01 | Stop reason: HOSPADM

## 2021-01-01 RX ORDER — CEFTRIAXONE 1 G/1
1 INJECTION, POWDER, FOR SOLUTION INTRAMUSCULAR; INTRAVENOUS EVERY 24 HOURS
Status: DISCONTINUED | OUTPATIENT
Start: 2021-01-01 | End: 2021-01-01

## 2021-01-01 RX ORDER — GABAPENTIN 100 MG/1
100 CAPSULE ORAL 2 TIMES DAILY
Status: DISCONTINUED | OUTPATIENT
Start: 2021-01-01 | End: 2021-01-01 | Stop reason: HOSPADM

## 2021-01-01 RX ORDER — AMOXICILLIN 250 MG
2 CAPSULE ORAL
Status: DISCONTINUED | OUTPATIENT
Start: 2021-01-01 | End: 2021-01-01 | Stop reason: HOSPADM

## 2021-01-01 RX ORDER — CALCIUM CARBONATE 500 MG/1
500 TABLET, CHEWABLE ORAL 2 TIMES DAILY PRN
Status: DISCONTINUED | OUTPATIENT
Start: 2021-01-01 | End: 2021-01-01 | Stop reason: HOSPADM

## 2021-01-01 RX ORDER — PROPOFOL 10 MG/ML
INJECTION, EMULSION INTRAVENOUS CONTINUOUS PRN
Status: DISCONTINUED | OUTPATIENT
Start: 2021-01-01 | End: 2021-01-01

## 2021-01-01 RX ORDER — TRAZODONE HYDROCHLORIDE 50 MG/1
TABLET, FILM COATED ORAL
Qty: 60 TABLET | Refills: 4 | Status: SHIPPED | OUTPATIENT
Start: 2021-01-01

## 2021-01-01 RX ORDER — SODIUM CHLORIDE, SODIUM LACTATE, POTASSIUM CHLORIDE, CALCIUM CHLORIDE 600; 310; 30; 20 MG/100ML; MG/100ML; MG/100ML; MG/100ML
INJECTION, SOLUTION INTRAVENOUS CONTINUOUS
Status: DISCONTINUED | OUTPATIENT
Start: 2021-01-01 | End: 2021-01-01

## 2021-01-01 RX ORDER — ALLOPURINOL 300 MG/1
300 TABLET ORAL DAILY
Qty: 30 TABLET | Refills: 0 | Status: SHIPPED | OUTPATIENT
Start: 2021-01-01 | End: 2021-01-01

## 2021-01-01 RX ORDER — ONDANSETRON 4 MG/1
4 TABLET, ORALLY DISINTEGRATING ORAL EVERY 6 HOURS PRN
Status: DISCONTINUED | OUTPATIENT
Start: 2021-01-01 | End: 2021-01-01 | Stop reason: HOSPADM

## 2021-01-01 RX ORDER — SODIUM CHLORIDE 9 MG/ML
INJECTION, SOLUTION INTRAVENOUS CONTINUOUS
Status: DISCONTINUED | OUTPATIENT
Start: 2021-01-01 | End: 2021-01-01

## 2021-01-01 RX ORDER — DEXAMETHASONE 4 MG/1
12 TABLET ORAL ONCE
Status: COMPLETED | OUTPATIENT
Start: 2021-01-01 | End: 2021-01-01

## 2021-01-01 RX ORDER — CEFAZOLIN SODIUM 2 G/100ML
2 INJECTION, SOLUTION INTRAVENOUS
Status: DISCONTINUED | OUTPATIENT
Start: 2021-01-01 | End: 2021-01-01

## 2021-01-01 RX ORDER — MEPERIDINE HYDROCHLORIDE 25 MG/ML
25 INJECTION INTRAMUSCULAR; INTRAVENOUS; SUBCUTANEOUS EVERY 30 MIN PRN
Status: CANCELLED | OUTPATIENT
Start: 2021-01-01

## 2021-01-01 RX ORDER — CEFAZOLIN SODIUM 1 G/3ML
INJECTION, POWDER, FOR SOLUTION INTRAMUSCULAR; INTRAVENOUS PRN
Status: DISCONTINUED | OUTPATIENT
Start: 2021-01-01 | End: 2021-01-01

## 2021-01-01 RX ORDER — DIMENHYDRINATE 50 MG/ML
25 INJECTION, SOLUTION INTRAMUSCULAR; INTRAVENOUS
Status: DISCONTINUED | OUTPATIENT
Start: 2021-01-01 | End: 2021-01-01 | Stop reason: HOSPADM

## 2021-01-01 RX ORDER — CEFTRIAXONE 1 G/1
1 INJECTION, POWDER, FOR SOLUTION INTRAMUSCULAR; INTRAVENOUS ONCE
Status: COMPLETED | OUTPATIENT
Start: 2021-01-01 | End: 2021-01-01

## 2021-01-01 RX ORDER — METHYLPREDNISOLONE SODIUM SUCCINATE 125 MG/2ML
125 INJECTION, POWDER, LYOPHILIZED, FOR SOLUTION INTRAMUSCULAR; INTRAVENOUS
Status: CANCELLED
Start: 2021-01-01

## 2021-01-01 RX ORDER — HEPARIN SODIUM 1000 [USP'U]/ML
INJECTION, SOLUTION INTRAVENOUS; SUBCUTANEOUS
Status: DISCONTINUED
Start: 2021-01-01 | End: 2021-01-01 | Stop reason: HOSPADM

## 2021-01-01 RX ORDER — ALLOPURINOL 300 MG/1
300 TABLET ORAL DAILY
Qty: 30 TABLET | Refills: 3 | Status: SHIPPED | OUTPATIENT
Start: 2021-01-01

## 2021-01-01 RX ORDER — FUROSEMIDE 10 MG/ML
40 INJECTION INTRAMUSCULAR; INTRAVENOUS ONCE
Status: COMPLETED | OUTPATIENT
Start: 2021-01-01 | End: 2021-01-01

## 2021-01-01 RX ORDER — BISACODYL 10 MG
10 SUPPOSITORY, RECTAL RECTAL DAILY PRN
Qty: 15 SUPPOSITORY | Refills: 3 | Status: SHIPPED | OUTPATIENT
Start: 2021-01-01

## 2021-01-01 RX ORDER — DEXAMETHASONE 4 MG/1
8 TABLET ORAL DAILY
Status: COMPLETED | OUTPATIENT
Start: 2021-01-01 | End: 2021-01-01

## 2021-01-01 RX ORDER — GLYCOPYRROLATE 0.2 MG/ML
INJECTION, SOLUTION INTRAMUSCULAR; INTRAVENOUS PRN
Status: DISCONTINUED | OUTPATIENT
Start: 2021-01-01 | End: 2021-01-01

## 2021-01-01 RX ORDER — FENTANYL CITRATE 50 UG/ML
INJECTION, SOLUTION INTRAMUSCULAR; INTRAVENOUS
Status: COMPLETED
Start: 2021-01-01 | End: 2021-01-01

## 2021-01-01 RX ORDER — OLANZAPINE 2.5 MG/1
TABLET, FILM COATED ORAL
Status: ON HOLD | COMMUNITY
Start: 2021-01-01 | End: 2021-01-01

## 2021-01-01 RX ORDER — HEPARIN SODIUM (PORCINE) LOCK FLUSH IV SOLN 100 UNIT/ML 100 UNIT/ML
5-10 SOLUTION INTRAVENOUS
Status: DISCONTINUED | OUTPATIENT
Start: 2021-01-01 | End: 2021-01-01 | Stop reason: HOSPADM

## 2021-01-01 RX ORDER — SODIUM CHLORIDE, SODIUM LACTATE, POTASSIUM CHLORIDE, CALCIUM CHLORIDE 600; 310; 30; 20 MG/100ML; MG/100ML; MG/100ML; MG/100ML
INJECTION, SOLUTION INTRAVENOUS CONTINUOUS PRN
Status: DISCONTINUED | OUTPATIENT
Start: 2021-01-01 | End: 2021-01-01

## 2021-01-01 RX ORDER — CIPROFLOXACIN 500 MG/1
500 TABLET, FILM COATED ORAL EVERY 12 HOURS SCHEDULED
Status: DISCONTINUED | OUTPATIENT
Start: 2021-01-01 | End: 2021-01-01 | Stop reason: HOSPADM

## 2021-01-01 RX ORDER — HEPARIN SODIUM (PORCINE) LOCK FLUSH IV SOLN 100 UNIT/ML 100 UNIT/ML
SOLUTION INTRAVENOUS PRN
Status: DISCONTINUED | OUTPATIENT
Start: 2021-01-01 | End: 2021-01-01 | Stop reason: HOSPADM

## 2021-01-01 RX ORDER — HEPARIN SODIUM,PORCINE 10 UNIT/ML
5-10 VIAL (ML) INTRAVENOUS
Status: DISCONTINUED | OUTPATIENT
Start: 2021-01-01 | End: 2021-01-01 | Stop reason: HOSPADM

## 2021-01-01 RX ORDER — LIDOCAINE HYDROCHLORIDE 10 MG/ML
20 INJECTION, SOLUTION EPIDURAL; INFILTRATION; INTRACAUDAL; PERINEURAL ONCE
Status: DISCONTINUED | OUTPATIENT
Start: 2021-01-01 | End: 2021-01-01

## 2021-01-01 RX ORDER — ONDANSETRON 2 MG/ML
INJECTION INTRAMUSCULAR; INTRAVENOUS PRN
Status: DISCONTINUED | OUTPATIENT
Start: 2021-01-01 | End: 2021-01-01

## 2021-01-01 RX ORDER — CEFAZOLIN SODIUM/WATER 2 G/20 ML
2 SYRINGE (ML) INTRAVENOUS SEE ADMIN INSTRUCTIONS
Status: DISCONTINUED | OUTPATIENT
Start: 2021-01-01 | End: 2021-01-01

## 2021-01-01 RX ORDER — GABAPENTIN 100 MG/1
100 CAPSULE ORAL 2 TIMES DAILY
Qty: 60 CAPSULE | Refills: 0 | Status: SHIPPED | OUTPATIENT
Start: 2021-01-01

## 2021-01-01 RX ORDER — OXYCODONE HYDROCHLORIDE 5 MG/1
TABLET ORAL
COMMUNITY
Start: 2021-01-01

## 2021-01-01 RX ORDER — DIPHENHYDRAMINE HYDROCHLORIDE 50 MG/ML
50 INJECTION INTRAMUSCULAR; INTRAVENOUS
Status: DISCONTINUED | OUTPATIENT
Start: 2021-01-01 | End: 2021-01-01 | Stop reason: HOSPADM

## 2021-01-01 RX ORDER — ACETAMINOPHEN 325 MG/1
650 TABLET ORAL EVERY 4 HOURS PRN
Qty: 50 TABLET | Refills: 0
Start: 2021-01-01

## 2021-01-01 RX ORDER — OXYCODONE HYDROCHLORIDE 5 MG/1
5-10 TABLET ORAL EVERY 4 HOURS PRN
Status: DISCONTINUED | OUTPATIENT
Start: 2021-01-01 | End: 2021-01-01 | Stop reason: HOSPADM

## 2021-01-01 RX ORDER — FENTANYL CITRATE 0.05 MG/ML
25 INJECTION, SOLUTION INTRAMUSCULAR; INTRAVENOUS
Status: DISCONTINUED | OUTPATIENT
Start: 2021-01-01 | End: 2021-01-01 | Stop reason: HOSPADM

## 2021-01-01 RX ORDER — HEPARIN SODIUM (PORCINE) LOCK FLUSH IV SOLN 100 UNIT/ML 100 UNIT/ML
5 SOLUTION INTRAVENOUS
Status: CANCELLED | OUTPATIENT
Start: 2021-01-01

## 2021-01-01 RX ORDER — PROCHLORPERAZINE MALEATE 5 MG
5-10 TABLET ORAL EVERY 6 HOURS PRN
Status: CANCELLED
Start: 2021-01-01

## 2021-01-01 RX ORDER — NALOXONE HYDROCHLORIDE 0.4 MG/ML
0.2 INJECTION, SOLUTION INTRAMUSCULAR; INTRAVENOUS; SUBCUTANEOUS
Status: DISCONTINUED | OUTPATIENT
Start: 2021-01-01 | End: 2021-01-01

## 2021-01-01 RX ORDER — AMOXICILLIN 250 MG
2 CAPSULE ORAL 2 TIMES DAILY
Status: DISCONTINUED | OUTPATIENT
Start: 2021-01-01 | End: 2021-01-01

## 2021-01-01 RX ORDER — CALCIUM CARBONATE 500 MG/1
500 TABLET, CHEWABLE ORAL DAILY PRN
Status: DISCONTINUED | OUTPATIENT
Start: 2021-01-01 | End: 2021-01-01

## 2021-01-01 RX ORDER — POLYETHYLENE GLYCOL 3350 17 G/17G
17 POWDER, FOR SOLUTION ORAL DAILY
Status: DISCONTINUED | OUTPATIENT
Start: 2021-01-01 | End: 2021-01-01

## 2021-01-01 RX ORDER — POLYETHYLENE GLYCOL 3350 17 G/17G
17 POWDER, FOR SOLUTION ORAL 2 TIMES DAILY
Status: DISCONTINUED | OUTPATIENT
Start: 2021-01-01 | End: 2021-01-01 | Stop reason: HOSPADM

## 2021-01-01 RX ORDER — CIPROFLOXACIN 500 MG/1
500 TABLET, FILM COATED ORAL EVERY 12 HOURS
Qty: 2 TABLET | Refills: 0 | Status: SHIPPED | OUTPATIENT
Start: 2021-01-01 | End: 2021-01-01

## 2021-01-01 RX ORDER — ALLOPURINOL 300 MG/1
300 TABLET ORAL DAILY
Status: DISCONTINUED | OUTPATIENT
Start: 2021-01-01 | End: 2021-01-01 | Stop reason: HOSPADM

## 2021-01-01 RX ORDER — HEPARIN SODIUM,PORCINE 10 UNIT/ML
5-10 VIAL (ML) INTRAVENOUS EVERY 24 HOURS
Status: DISCONTINUED | OUTPATIENT
Start: 2021-01-01 | End: 2021-01-01 | Stop reason: HOSPADM

## 2021-01-01 RX ORDER — ALBUTEROL SULFATE 90 UG/1
1-2 AEROSOL, METERED RESPIRATORY (INHALATION)
Status: DISCONTINUED | OUTPATIENT
Start: 2021-01-01 | End: 2021-01-01

## 2021-01-01 RX ORDER — DEXAMETHASONE SODIUM PHOSPHATE 4 MG/ML
INJECTION, SOLUTION INTRA-ARTICULAR; INTRALESIONAL; INTRAMUSCULAR; INTRAVENOUS; SOFT TISSUE PRN
Status: DISCONTINUED | OUTPATIENT
Start: 2021-01-01 | End: 2021-01-01

## 2021-01-01 RX ORDER — HYDROCODONE BITARTRATE AND ACETAMINOPHEN 5; 325 MG/1; MG/1
1 TABLET ORAL
Status: DISCONTINUED | OUTPATIENT
Start: 2021-01-01 | End: 2021-01-01 | Stop reason: HOSPADM

## 2021-01-01 RX ORDER — FENTANYL CITRATE 50 UG/ML
INJECTION, SOLUTION INTRAMUSCULAR; INTRAVENOUS PRN
Status: DISCONTINUED | OUTPATIENT
Start: 2021-01-01 | End: 2021-01-01

## 2021-01-01 RX ORDER — EPINEPHRINE 1 MG/ML
0.3 INJECTION, SOLUTION, CONCENTRATE INTRAVENOUS EVERY 5 MIN PRN
Status: DISCONTINUED | OUTPATIENT
Start: 2021-01-01 | End: 2021-01-01 | Stop reason: HOSPADM

## 2021-01-01 RX ORDER — ALBUTEROL SULFATE 90 UG/1
2 AEROSOL, METERED RESPIRATORY (INHALATION) 4 TIMES DAILY PRN
Status: DISCONTINUED | OUTPATIENT
Start: 2021-01-01 | End: 2021-01-01 | Stop reason: HOSPADM

## 2021-01-01 RX ORDER — HYDROMORPHONE HCL IN WATER/PF 6 MG/30 ML
0.4 PATIENT CONTROLLED ANALGESIA SYRINGE INTRAVENOUS EVERY 5 MIN PRN
Status: DISCONTINUED | OUTPATIENT
Start: 2021-01-01 | End: 2021-01-01 | Stop reason: HOSPADM

## 2021-01-01 RX ADMIN — MIDAZOLAM 1 MG: 1 INJECTION INTRAMUSCULAR; INTRAVENOUS at 14:13

## 2021-01-01 RX ADMIN — POTASSIUM & SODIUM PHOSPHATES POWDER PACK 280-160-250 MG 1 PACKET: 280-160-250 PACK at 08:56

## 2021-01-01 RX ADMIN — CIPROFLOXACIN HYDROCHLORIDE 500 MG: 500 TABLET, FILM COATED ORAL at 19:58

## 2021-01-01 RX ADMIN — CIPROFLOXACIN HYDROCHLORIDE 500 MG: 500 TABLET, FILM COATED ORAL at 19:47

## 2021-01-01 RX ADMIN — POLYETHYLENE GLYCOL 3350 17 G: 17 POWDER, FOR SOLUTION ORAL at 08:15

## 2021-01-01 RX ADMIN — PREDNISONE 5 MG: 5 TABLET ORAL at 08:56

## 2021-01-01 RX ADMIN — POLYETHYLENE GLYCOL 3350 17 G: 17 POWDER, FOR SOLUTION ORAL at 21:24

## 2021-01-01 RX ADMIN — CIPROFLOXACIN HYDROCHLORIDE 500 MG: 500 TABLET, FILM COATED ORAL at 09:23

## 2021-01-01 RX ADMIN — CIPROFLOXACIN HYDROCHLORIDE 500 MG: 500 TABLET, FILM COATED ORAL at 08:36

## 2021-01-01 RX ADMIN — POLYETHYLENE GLYCOL 3350 17 G: 17 POWDER, FOR SOLUTION ORAL at 20:09

## 2021-01-01 RX ADMIN — CIPROFLOXACIN HYDROCHLORIDE 500 MG: 500 TABLET, FILM COATED ORAL at 08:15

## 2021-01-01 RX ADMIN — TRAZODONE HYDROCHLORIDE 50 MG: 50 TABLET ORAL at 21:34

## 2021-01-01 RX ADMIN — OXYCODONE HYDROCHLORIDE 10 MG: 5 TABLET ORAL at 23:49

## 2021-01-01 RX ADMIN — POLYETHYLENE GLYCOL 3350 17 G: 17 POWDER, FOR SOLUTION ORAL at 09:23

## 2021-01-01 RX ADMIN — POTASSIUM & SODIUM PHOSPHATES POWDER PACK 280-160-250 MG 1 PACKET: 280-160-250 PACK at 08:38

## 2021-01-01 RX ADMIN — FOSAPREPITANT DIMEGLUMINE 150 MG: 150 INJECTION, POWDER, LYOPHILIZED, FOR SOLUTION INTRAVENOUS at 10:36

## 2021-01-01 RX ADMIN — PREDNISONE 5 MG: 5 TABLET ORAL at 08:06

## 2021-01-01 RX ADMIN — ONDANSETRON 4 MG: 2 INJECTION INTRAMUSCULAR; INTRAVENOUS at 10:46

## 2021-01-01 RX ADMIN — GABAPENTIN 100 MG: 100 CAPSULE ORAL at 20:50

## 2021-01-01 RX ADMIN — PREDNISONE 5 MG: 5 TABLET ORAL at 14:24

## 2021-01-01 RX ADMIN — TRAZODONE HYDROCHLORIDE 50 MG: 50 TABLET ORAL at 22:40

## 2021-01-01 RX ADMIN — ETOPOSIDE 165 MG: 20 INJECTION INTRAVENOUS at 13:57

## 2021-01-01 RX ADMIN — CIPROFLOXACIN HYDROCHLORIDE 500 MG: 500 TABLET, FILM COATED ORAL at 21:15

## 2021-01-01 RX ADMIN — DOCUSATE SODIUM AND SENNOSIDES 2 TABLET: 8.6; 5 TABLET, FILM COATED ORAL at 10:46

## 2021-01-01 RX ADMIN — OXYCODONE HYDROCHLORIDE 10 MG: 5 TABLET ORAL at 15:01

## 2021-01-01 RX ADMIN — CIPROFLOXACIN HYDROCHLORIDE 500 MG: 500 TABLET, FILM COATED ORAL at 20:41

## 2021-01-01 RX ADMIN — ENOXAPARIN SODIUM 40 MG: 40 INJECTION SUBCUTANEOUS at 14:23

## 2021-01-01 RX ADMIN — FUROSEMIDE 40 MG: 10 INJECTION, SOLUTION INTRAMUSCULAR; INTRAVENOUS at 15:07

## 2021-01-01 RX ADMIN — POLYETHYLENE GLYCOL 3350 17 G: 17 POWDER, FOR SOLUTION ORAL at 22:19

## 2021-01-01 RX ADMIN — ACETAMINOPHEN 650 MG: 325 TABLET, FILM COATED ORAL at 00:15

## 2021-01-01 RX ADMIN — CEFTRIAXONE 1 G: 1 INJECTION, POWDER, FOR SOLUTION INTRAMUSCULAR; INTRAVENOUS at 17:40

## 2021-01-01 RX ADMIN — MAGNESIUM HYDROXIDE 30 ML: 400 SUSPENSION ORAL at 21:09

## 2021-01-01 RX ADMIN — GABAPENTIN 100 MG: 100 CAPSULE ORAL at 21:41

## 2021-01-01 RX ADMIN — POLYETHYLENE GLYCOL 3350 17 G: 17 POWDER, FOR SOLUTION ORAL at 20:49

## 2021-01-01 RX ADMIN — SODIUM CHLORIDE: 9 INJECTION, SOLUTION INTRAVENOUS at 08:25

## 2021-01-01 RX ADMIN — ALLOPURINOL 300 MG: 300 TABLET ORAL at 08:36

## 2021-01-01 RX ADMIN — POTASSIUM & SODIUM PHOSPHATES POWDER PACK 280-160-250 MG 2 PACKET: 280-160-250 PACK at 22:59

## 2021-01-01 RX ADMIN — ZOLEDRONIC ACID 4 MG: 4 INJECTION, SOLUTION, CONCENTRATE INTRAVENOUS at 15:13

## 2021-01-01 RX ADMIN — SODIUM CHLORIDE: 9 INJECTION, SOLUTION INTRAVENOUS at 20:31

## 2021-01-01 RX ADMIN — CEFTRIAXONE 1 G: 1 INJECTION, POWDER, FOR SOLUTION INTRAMUSCULAR; INTRAVENOUS at 17:20

## 2021-01-01 RX ADMIN — TRAZODONE HYDROCHLORIDE 50 MG: 50 TABLET ORAL at 21:58

## 2021-01-01 RX ADMIN — LIDOCAINE HYDROCHLORIDE 50 MG: 20 INJECTION, SOLUTION INFILTRATION; PERINEURAL at 13:30

## 2021-01-01 RX ADMIN — CIPROFLOXACIN HYDROCHLORIDE 500 MG: 500 TABLET, FILM COATED ORAL at 07:58

## 2021-01-01 RX ADMIN — POLYETHYLENE GLYCOL 3350 17 G: 17 POWDER, FOR SOLUTION ORAL at 08:56

## 2021-01-01 RX ADMIN — PREDNISONE 5 MG: 5 TABLET ORAL at 08:36

## 2021-01-01 RX ADMIN — CIPROFLOXACIN HYDROCHLORIDE 500 MG: 500 TABLET, FILM COATED ORAL at 21:36

## 2021-01-01 RX ADMIN — POLYETHYLENE GLYCOL 3350 17 G: 17 POWDER, FOR SOLUTION ORAL at 09:01

## 2021-01-01 RX ADMIN — TRAZODONE HYDROCHLORIDE 50 MG: 50 TABLET ORAL at 23:05

## 2021-01-01 RX ADMIN — IOPAMIDOL 72 ML: 755 INJECTION, SOLUTION INTRAVENOUS at 17:09

## 2021-01-01 RX ADMIN — SODIUM CHLORIDE: 9 INJECTION, SOLUTION INTRAVENOUS at 21:23

## 2021-01-01 RX ADMIN — OXYCODONE HYDROCHLORIDE 10 MG: 5 TABLET ORAL at 21:46

## 2021-01-01 RX ADMIN — GABAPENTIN 100 MG: 100 CAPSULE ORAL at 22:19

## 2021-01-01 RX ADMIN — ALLOPURINOL 300 MG: 300 TABLET ORAL at 10:05

## 2021-01-01 RX ADMIN — GABAPENTIN 100 MG: 100 CAPSULE ORAL at 21:24

## 2021-01-01 RX ADMIN — OXYCODONE HYDROCHLORIDE 5 MG: 5 TABLET ORAL at 10:02

## 2021-01-01 RX ADMIN — TRAZODONE HYDROCHLORIDE 50 MG: 50 TABLET ORAL at 21:09

## 2021-01-01 RX ADMIN — OXYCODONE HYDROCHLORIDE 10 MG: 5 TABLET ORAL at 21:09

## 2021-01-01 RX ADMIN — OXYCODONE HYDROCHLORIDE 10 MG: 5 TABLET ORAL at 00:44

## 2021-01-01 RX ADMIN — OXYCODONE HYDROCHLORIDE 5 MG: 5 TABLET ORAL at 04:57

## 2021-01-01 RX ADMIN — PROPOFOL 170 MG: 10 INJECTION, EMULSION INTRAVENOUS at 14:00

## 2021-01-01 RX ADMIN — SODIUM CHLORIDE: 9 INJECTION, SOLUTION INTRAVENOUS at 18:02

## 2021-01-01 RX ADMIN — OXYCODONE HYDROCHLORIDE 10 MG: 5 TABLET ORAL at 08:56

## 2021-01-01 RX ADMIN — FENTANYL CITRATE 50 MCG: 50 INJECTION, SOLUTION INTRAMUSCULAR; INTRAVENOUS at 13:39

## 2021-01-01 RX ADMIN — PHENYLEPHRINE HYDROCHLORIDE 100 MCG: 10 INJECTION INTRAVENOUS at 13:49

## 2021-01-01 RX ADMIN — POLYETHYLENE GLYCOL 3350 17 G: 17 POWDER, FOR SOLUTION ORAL at 08:35

## 2021-01-01 RX ADMIN — PREDNISONE 5 MG: 5 TABLET ORAL at 15:12

## 2021-01-01 RX ADMIN — CALCIUM CARBONATE (ANTACID) CHEW TAB 500 MG 500 MG: 500 CHEW TAB at 21:58

## 2021-01-01 RX ADMIN — TRAZODONE HYDROCHLORIDE 50 MG: 50 TABLET ORAL at 21:46

## 2021-01-01 RX ADMIN — SODIUM CHLORIDE 95 ML: 9 INJECTION, SOLUTION INTRAVENOUS at 17:09

## 2021-01-01 RX ADMIN — SODIUM CHLORIDE: 9 INJECTION, SOLUTION INTRAVENOUS at 04:15

## 2021-01-01 RX ADMIN — TRAZODONE HYDROCHLORIDE 50 MG: 50 TABLET ORAL at 21:43

## 2021-01-01 RX ADMIN — PREDNISONE 5 MG: 5 TABLET ORAL at 08:24

## 2021-01-01 RX ADMIN — TRAZODONE HYDROCHLORIDE 50 MG: 50 TABLET ORAL at 22:23

## 2021-01-01 RX ADMIN — PREDNISONE 5 MG: 5 TABLET ORAL at 14:13

## 2021-01-01 RX ADMIN — ONDANSETRON HYDROCHLORIDE 4 MG: 2 INJECTION, SOLUTION INTRAVENOUS at 14:22

## 2021-01-01 RX ADMIN — DEXAMETHASONE 8 MG: 4 TABLET ORAL at 10:04

## 2021-01-01 RX ADMIN — OXYCODONE HYDROCHLORIDE 10 MG: 5 TABLET ORAL at 16:38

## 2021-01-01 RX ADMIN — DOCUSATE SODIUM AND SENNOSIDES 2 TABLET: 8.6; 5 TABLET, FILM COATED ORAL at 23:00

## 2021-01-01 RX ADMIN — FENTANYL CITRATE 50 MCG: 50 INJECTION, SOLUTION INTRAMUSCULAR; INTRAVENOUS at 14:12

## 2021-01-01 RX ADMIN — SODIUM CHLORIDE, POTASSIUM CHLORIDE, SODIUM LACTATE AND CALCIUM CHLORIDE: 600; 310; 30; 20 INJECTION, SOLUTION INTRAVENOUS at 13:31

## 2021-01-01 RX ADMIN — OXYCODONE HYDROCHLORIDE 10 MG: 5 TABLET ORAL at 20:08

## 2021-01-01 RX ADMIN — ENOXAPARIN SODIUM 40 MG: 40 INJECTION SUBCUTANEOUS at 08:55

## 2021-01-01 RX ADMIN — GABAPENTIN 100 MG: 100 CAPSULE ORAL at 08:58

## 2021-01-01 RX ADMIN — CALCIUM CARBONATE (ANTACID) CHEW TAB 500 MG 500 MG: 500 CHEW TAB at 14:24

## 2021-01-01 RX ADMIN — PREDNISONE 5 MG: 5 TABLET ORAL at 14:05

## 2021-01-01 RX ADMIN — POLYETHYLENE GLYCOL 3350 17 G: 17 POWDER, FOR SOLUTION ORAL at 21:36

## 2021-01-01 RX ADMIN — GABAPENTIN 100 MG: 100 CAPSULE ORAL at 20:49

## 2021-01-01 RX ADMIN — OXYCODONE HYDROCHLORIDE 5 MG: 5 TABLET ORAL at 09:10

## 2021-01-01 RX ADMIN — ENOXAPARIN SODIUM 40 MG: 40 INJECTION SUBCUTANEOUS at 15:00

## 2021-01-01 RX ADMIN — SODIUM PHOSPHATE, MONOBASIC, MONOHYDRATE 15 MMOL: 276; 142 INJECTION, SOLUTION INTRAVENOUS at 18:08

## 2021-01-01 RX ADMIN — OXYCODONE HYDROCHLORIDE 10 MG: 5 TABLET ORAL at 06:41

## 2021-01-01 RX ADMIN — CIPROFLOXACIN HYDROCHLORIDE 500 MG: 500 TABLET, FILM COATED ORAL at 07:47

## 2021-01-01 RX ADMIN — CEFAZOLIN 2 G: 1 INJECTION, POWDER, FOR SOLUTION INTRAMUSCULAR; INTRAVENOUS at 13:35

## 2021-01-01 RX ADMIN — GABAPENTIN 100 MG: 100 CAPSULE ORAL at 21:35

## 2021-01-01 RX ADMIN — POLYETHYLENE GLYCOL 3350 17 G: 17 POWDER, FOR SOLUTION ORAL at 10:04

## 2021-01-01 RX ADMIN — ONDANSETRON 4 MG: 2 INJECTION INTRAMUSCULAR; INTRAVENOUS at 13:53

## 2021-01-01 RX ADMIN — POTASSIUM & SODIUM PHOSPHATES POWDER PACK 280-160-250 MG 1 PACKET: 280-160-250 PACK at 22:40

## 2021-01-01 RX ADMIN — CEFTRIAXONE SODIUM 1 G: 1 INJECTION, POWDER, FOR SOLUTION INTRAMUSCULAR; INTRAVENOUS at 18:06

## 2021-01-01 RX ADMIN — MAGNESIUM HYDROXIDE 30 ML: 400 SUSPENSION ORAL at 08:43

## 2021-01-01 RX ADMIN — OXYCODONE HYDROCHLORIDE 10 MG: 5 TABLET ORAL at 13:52

## 2021-01-01 RX ADMIN — PREDNISONE 5 MG: 5 TABLET ORAL at 13:25

## 2021-01-01 RX ADMIN — OXYCODONE HYDROCHLORIDE 10 MG: 5 TABLET ORAL at 22:23

## 2021-01-01 RX ADMIN — GABAPENTIN 100 MG: 100 CAPSULE ORAL at 21:16

## 2021-01-01 RX ADMIN — OXYCODONE HYDROCHLORIDE 5 MG: 5 TABLET ORAL at 20:49

## 2021-01-01 RX ADMIN — POLYETHYLENE GLYCOL 3350 17 G: 17 POWDER, FOR SOLUTION ORAL at 21:16

## 2021-01-01 RX ADMIN — PREDNISONE 5 MG: 5 TABLET ORAL at 09:06

## 2021-01-01 RX ADMIN — GABAPENTIN 100 MG: 100 CAPSULE ORAL at 20:04

## 2021-01-01 RX ADMIN — ENOXAPARIN SODIUM 40 MG: 40 INJECTION SUBCUTANEOUS at 14:05

## 2021-01-01 RX ADMIN — LIDOCAINE HYDROCHLORIDE 50 MG: 10 INJECTION, SOLUTION INFILTRATION; PERINEURAL at 14:00

## 2021-01-01 RX ADMIN — CIPROFLOXACIN HYDROCHLORIDE 500 MG: 500 TABLET, FILM COATED ORAL at 08:06

## 2021-01-01 RX ADMIN — ONDANSETRON 4 MG: 2 INJECTION INTRAMUSCULAR; INTRAVENOUS at 18:17

## 2021-01-01 RX ADMIN — POLYETHYLENE GLYCOL 3350 17 G: 17 POWDER, FOR SOLUTION ORAL at 08:23

## 2021-01-01 RX ADMIN — MIDAZOLAM 2 MG: 1 INJECTION INTRAMUSCULAR; INTRAVENOUS at 13:50

## 2021-01-01 RX ADMIN — PREDNISONE 5 MG: 5 TABLET ORAL at 15:01

## 2021-01-01 RX ADMIN — DEXAMETHASONE SODIUM PHOSPHATE 4 MG: 4 INJECTION, SOLUTION INTRA-ARTICULAR; INTRALESIONAL; INTRAMUSCULAR; INTRAVENOUS; SOFT TISSUE at 14:00

## 2021-01-01 RX ADMIN — MIDAZOLAM 2 MG: 1 INJECTION INTRAMUSCULAR; INTRAVENOUS at 13:30

## 2021-01-01 RX ADMIN — POTASSIUM & SODIUM PHOSPHATES POWDER PACK 280-160-250 MG 1 PACKET: 280-160-250 PACK at 21:09

## 2021-01-01 RX ADMIN — POLYETHYLENE GLYCOL 3350 17 G: 17 POWDER, FOR SOLUTION ORAL at 07:58

## 2021-01-01 RX ADMIN — PREDNISONE 5 MG: 5 TABLET ORAL at 14:08

## 2021-01-01 RX ADMIN — POTASSIUM & SODIUM PHOSPHATES POWDER PACK 280-160-250 MG 1 PACKET: 280-160-250 PACK at 16:34

## 2021-01-01 RX ADMIN — CIPROFLOXACIN HYDROCHLORIDE 500 MG: 500 TABLET, FILM COATED ORAL at 07:28

## 2021-01-01 RX ADMIN — POLYETHYLENE GLYCOL 3350 17 G: 17 POWDER, FOR SOLUTION ORAL at 21:42

## 2021-01-01 RX ADMIN — ALLOPURINOL 300 MG: 300 TABLET ORAL at 08:58

## 2021-01-01 RX ADMIN — ENOXAPARIN SODIUM 40 MG: 40 INJECTION SUBCUTANEOUS at 13:19

## 2021-01-01 RX ADMIN — ENOXAPARIN SODIUM 40 MG: 40 INJECTION SUBCUTANEOUS at 14:03

## 2021-01-01 RX ADMIN — OMEPRAZOLE 20 MG: 20 CAPSULE, DELAYED RELEASE ORAL at 17:08

## 2021-01-01 RX ADMIN — GABAPENTIN 100 MG: 100 CAPSULE ORAL at 08:56

## 2021-01-01 RX ADMIN — GABAPENTIN 100 MG: 100 CAPSULE ORAL at 09:23

## 2021-01-01 RX ADMIN — IOPAMIDOL 92 ML: 755 INJECTION, SOLUTION INTRAVENOUS at 10:26

## 2021-01-01 RX ADMIN — TRAZODONE HYDROCHLORIDE 50 MG: 50 TABLET ORAL at 22:19

## 2021-01-01 RX ADMIN — CARBOPLATIN 385 MG: 10 INJECTION, SOLUTION INTRAVENOUS at 12:17

## 2021-01-01 RX ADMIN — CIPROFLOXACIN HYDROCHLORIDE 500 MG: 500 TABLET, FILM COATED ORAL at 19:50

## 2021-01-01 RX ADMIN — ALLOPURINOL 300 MG: 300 TABLET ORAL at 09:23

## 2021-01-01 RX ADMIN — GABAPENTIN 100 MG: 100 CAPSULE ORAL at 09:24

## 2021-01-01 RX ADMIN — GLYCOPYRROLATE 0.2 MG: 0.2 INJECTION, SOLUTION INTRAMUSCULAR; INTRAVENOUS at 14:00

## 2021-01-01 RX ADMIN — SODIUM CHLORIDE: 9 INJECTION, SOLUTION INTRAVENOUS at 08:43

## 2021-01-01 RX ADMIN — OMEPRAZOLE 20 MG: 20 CAPSULE, DELAYED RELEASE ORAL at 07:39

## 2021-01-01 RX ADMIN — BISACODYL 10 MG: 10 SUPPOSITORY RECTAL at 10:47

## 2021-01-01 RX ADMIN — OXYCODONE HYDROCHLORIDE 10 MG: 5 TABLET ORAL at 06:34

## 2021-01-01 RX ADMIN — OXYCODONE HYDROCHLORIDE 10 MG: 5 TABLET ORAL at 05:10

## 2021-01-01 RX ADMIN — Medication 2 G: at 13:48

## 2021-01-01 RX ADMIN — CIPROFLOXACIN HYDROCHLORIDE 500 MG: 500 TABLET, FILM COATED ORAL at 20:50

## 2021-01-01 RX ADMIN — SODIUM CHLORIDE: 9 INJECTION, SOLUTION INTRAVENOUS at 09:01

## 2021-01-01 RX ADMIN — PREDNISONE 5 MG: 5 TABLET ORAL at 08:15

## 2021-01-01 RX ADMIN — ENOXAPARIN SODIUM 40 MG: 40 INJECTION SUBCUTANEOUS at 07:59

## 2021-01-01 RX ADMIN — PREDNISONE 5 MG: 5 TABLET ORAL at 09:23

## 2021-01-01 RX ADMIN — POTASSIUM & SODIUM PHOSPHATES POWDER PACK 280-160-250 MG 1 PACKET: 280-160-250 PACK at 11:38

## 2021-01-01 RX ADMIN — CIPROFLOXACIN HYDROCHLORIDE 500 MG: 500 TABLET, FILM COATED ORAL at 21:24

## 2021-01-01 RX ADMIN — CIPROFLOXACIN HYDROCHLORIDE 500 MG: 500 TABLET, FILM COATED ORAL at 08:02

## 2021-01-01 RX ADMIN — DEXAMETHASONE 8 MG: 4 TABLET ORAL at 09:24

## 2021-01-01 RX ADMIN — ALLOPURINOL 300 MG: 300 TABLET ORAL at 16:01

## 2021-01-01 RX ADMIN — CIPROFLOXACIN HYDROCHLORIDE 500 MG: 500 TABLET, FILM COATED ORAL at 07:39

## 2021-01-01 RX ADMIN — OXYCODONE HYDROCHLORIDE 10 MG: 5 TABLET ORAL at 17:08

## 2021-01-01 RX ADMIN — ETOPOSIDE 165 MG: 20 INJECTION INTRAVENOUS at 14:13

## 2021-01-01 RX ADMIN — FUROSEMIDE 20 MG: 10 INJECTION, SOLUTION INTRAMUSCULAR; INTRAVENOUS at 13:11

## 2021-01-01 RX ADMIN — POTASSIUM & SODIUM PHOSPHATES POWDER PACK 280-160-250 MG 1 PACKET: 280-160-250 PACK at 16:56

## 2021-01-01 RX ADMIN — DOCUSATE SODIUM 286 ML: 50 LIQUID ORAL at 17:21

## 2021-01-01 RX ADMIN — FENTANYL CITRATE 100 MCG: 50 INJECTION, SOLUTION INTRAMUSCULAR; INTRAVENOUS at 14:00

## 2021-01-01 RX ADMIN — POLYETHYLENE GLYCOL 3350 17 G: 17 POWDER, FOR SOLUTION ORAL at 10:05

## 2021-01-01 RX ADMIN — OXYCODONE HYDROCHLORIDE 10 MG: 5 TABLET ORAL at 18:04

## 2021-01-01 RX ADMIN — ENOXAPARIN SODIUM 40 MG: 40 INJECTION SUBCUTANEOUS at 13:11

## 2021-01-01 RX ADMIN — ETOPOSIDE 165 MG: 20 INJECTION INTRAVENOUS at 15:29

## 2021-01-01 RX ADMIN — SODIUM CHLORIDE, POTASSIUM CHLORIDE, SODIUM LACTATE AND CALCIUM CHLORIDE: 600; 310; 30; 20 INJECTION, SOLUTION INTRAVENOUS at 13:30

## 2021-01-01 RX ADMIN — OXYCODONE HYDROCHLORIDE 10 MG: 5 TABLET ORAL at 19:47

## 2021-01-01 RX ADMIN — ENOXAPARIN SODIUM 40 MG: 40 INJECTION SUBCUTANEOUS at 08:24

## 2021-01-01 RX ADMIN — PREDNISONE 5 MG: 5 TABLET ORAL at 07:58

## 2021-01-01 RX ADMIN — ONDANSETRON 16 MG: 4 TABLET, ORALLY DISINTEGRATING ORAL at 10:35

## 2021-01-01 RX ADMIN — SODIUM CHLORIDE 66 ML: 9 INJECTION, SOLUTION INTRAVENOUS at 10:30

## 2021-01-01 RX ADMIN — CIPROFLOXACIN HYDROCHLORIDE 500 MG: 500 TABLET, FILM COATED ORAL at 20:04

## 2021-01-01 RX ADMIN — CALCIUM CARBONATE (ANTACID) CHEW TAB 500 MG 500 MG: 500 CHEW TAB at 00:07

## 2021-01-01 RX ADMIN — OXYCODONE HYDROCHLORIDE 10 MG: 5 TABLET ORAL at 16:05

## 2021-01-01 RX ADMIN — SODIUM PHOSPHATE, MONOBASIC, MONOHYDRATE 15 MMOL: 276; 142 INJECTION, SOLUTION INTRAVENOUS at 15:07

## 2021-01-01 RX ADMIN — POTASSIUM & SODIUM PHOSPHATES POWDER PACK 280-160-250 MG 1 PACKET: 280-160-250 PACK at 16:01

## 2021-01-01 RX ADMIN — SODIUM CHLORIDE 250 ML: 9 INJECTION, SOLUTION INTRAVENOUS at 18:05

## 2021-01-01 RX ADMIN — TRAZODONE HYDROCHLORIDE 50 MG: 50 TABLET ORAL at 23:00

## 2021-01-01 RX ADMIN — OMEPRAZOLE 20 MG: 20 CAPSULE, DELAYED RELEASE ORAL at 06:35

## 2021-01-01 RX ADMIN — GABAPENTIN 100 MG: 100 CAPSULE ORAL at 07:58

## 2021-01-01 RX ADMIN — POTASSIUM & SODIUM PHOSPHATES POWDER PACK 280-160-250 MG 2 PACKET: 280-160-250 PACK at 10:45

## 2021-01-01 RX ADMIN — GABAPENTIN 100 MG: 100 CAPSULE ORAL at 08:15

## 2021-01-01 RX ADMIN — ACETAMINOPHEN 650 MG: 325 TABLET, FILM COATED ORAL at 05:00

## 2021-01-01 RX ADMIN — POTASSIUM & SODIUM PHOSPHATES POWDER PACK 280-160-250 MG 1 PACKET: 280-160-250 PACK at 21:58

## 2021-01-01 RX ADMIN — SODIUM PHOSPHATE, DIBASIC AND SODIUM PHOSPHATE, MONOBASIC 1 ENEMA: 7; 19 ENEMA RECTAL at 13:32

## 2021-01-01 RX ADMIN — SODIUM CHLORIDE: 9 INJECTION, SOLUTION INTRAVENOUS at 00:41

## 2021-01-01 RX ADMIN — PREDNISONE 5 MG: 5 TABLET ORAL at 10:04

## 2021-01-01 RX ADMIN — OXYCODONE HYDROCHLORIDE 10 MG: 5 TABLET ORAL at 21:34

## 2021-01-01 RX ADMIN — GABAPENTIN 100 MG: 100 CAPSULE ORAL at 10:04

## 2021-01-01 RX ADMIN — ALLOPURINOL 300 MG: 300 TABLET ORAL at 10:04

## 2021-01-01 RX ADMIN — ACETAMINOPHEN 650 MG: 325 TABLET, FILM COATED ORAL at 09:06

## 2021-01-01 RX ADMIN — SODIUM CHLORIDE: 9 INJECTION, SOLUTION INTRAVENOUS at 20:41

## 2021-01-01 RX ADMIN — PREDNISONE 5 MG: 5 TABLET ORAL at 13:13

## 2021-01-01 RX ADMIN — PROPOFOL 30 MG: 10 INJECTION, EMULSION INTRAVENOUS at 13:38

## 2021-01-01 RX ADMIN — DEXAMETHASONE 12 MG: 4 TABLET ORAL at 10:35

## 2021-01-01 RX ADMIN — OXYCODONE HYDROCHLORIDE 10 MG: 5 TABLET ORAL at 20:57

## 2021-01-01 RX ADMIN — GABAPENTIN 100 MG: 100 CAPSULE ORAL at 08:36

## 2021-01-01 RX ADMIN — OMEPRAZOLE 20 MG: 20 CAPSULE, DELAYED RELEASE ORAL at 21:35

## 2021-01-01 RX ADMIN — ACETAMINOPHEN 650 MG: 325 TABLET, FILM COATED ORAL at 18:02

## 2021-01-01 RX ADMIN — POTASSIUM & SODIUM PHOSPHATES POWDER PACK 280-160-250 MG 2 PACKET: 280-160-250 PACK at 17:38

## 2021-01-01 RX ADMIN — OXYCODONE HYDROCHLORIDE 10 MG: 5 TABLET ORAL at 15:44

## 2021-01-01 RX ADMIN — GABAPENTIN 100 MG: 100 CAPSULE ORAL at 10:05

## 2021-01-01 RX ADMIN — OXYCODONE HYDROCHLORIDE 10 MG: 5 TABLET ORAL at 08:58

## 2021-01-01 RX ADMIN — TRAZODONE HYDROCHLORIDE 50 MG: 50 TABLET ORAL at 22:07

## 2021-01-01 RX ADMIN — DOCUSATE SODIUM AND SENNOSIDES 2 TABLET: 8.6; 5 TABLET, FILM COATED ORAL at 09:06

## 2021-01-01 RX ADMIN — GABAPENTIN 100 MG: 100 CAPSULE ORAL at 20:41

## 2021-01-01 RX ADMIN — OMEPRAZOLE 20 MG: 20 CAPSULE, DELAYED RELEASE ORAL at 06:37

## 2021-01-01 RX ADMIN — PROPOFOL 100 MCG/KG/MIN: 10 INJECTION, EMULSION INTRAVENOUS at 13:34

## 2021-01-01 RX ADMIN — GABAPENTIN 100 MG: 100 CAPSULE ORAL at 21:58

## 2021-01-01 RX ADMIN — GABAPENTIN 100 MG: 100 CAPSULE ORAL at 08:06

## 2021-01-01 RX ADMIN — OMEPRAZOLE 20 MG: 20 CAPSULE, DELAYED RELEASE ORAL at 17:47

## 2021-01-01 RX ADMIN — LIDOCAINE HYDROCHLORIDE 20 ML: 10 INJECTION, SOLUTION EPIDURAL; INFILTRATION; INTRACAUDAL; PERINEURAL at 14:13

## 2021-01-01 RX ADMIN — POLYETHYLENE GLYCOL 3350 17 G: 17 POWDER, FOR SOLUTION ORAL at 08:06

## 2021-01-01 ASSESSMENT — ACTIVITIES OF DAILY LIVING (ADL)
ADLS_ACUITY_SCORE: 10
ADLS_ACUITY_SCORE: 10
FALL_HISTORY_WITHIN_LAST_SIX_MONTHS: NO
ADLS_ACUITY_SCORE: 10
ADLS_ACUITY_SCORE: 12
DOING_ERRANDS_INDEPENDENTLY_DIFFICULTY: NO
ADLS_ACUITY_SCORE: 10
DIFFICULTY_EATING/SWALLOWING: YES
ADLS_ACUITY_SCORE: 10
ADLS_ACUITY_SCORE: 11
DRESSING/BATHING_DIFFICULTY: NO
ADLS_ACUITY_SCORE: 10
ADLS_ACUITY_SCORE: 10
HEARING_DIFFICULTY_OR_DEAF: NO
ADLS_ACUITY_SCORE: 10
ADLS_ACUITY_SCORE: 10
ADLS_ACUITY_SCORE: 9
ADLS_ACUITY_SCORE: 10
ADLS_ACUITY_SCORE: 12
ADLS_ACUITY_SCORE: 10
ADLS_ACUITY_SCORE: 12
ADLS_ACUITY_SCORE: 10
ADLS_ACUITY_SCORE: 14
ADLS_ACUITY_SCORE: 12
ADLS_ACUITY_SCORE: 10
ADLS_ACUITY_SCORE: 10
ADLS_ACUITY_SCORE: 12
ADLS_ACUITY_SCORE: 10
TOILETING_ISSUES: NO
ADLS_ACUITY_SCORE: 10
DIFFICULTY_COMMUNICATING: NO
PATIENT_/_FAMILY_COMMUNICATION_STYLE: SPOKEN LANGUAGE (ENGLISH OR BILINGUAL)
ADLS_ACUITY_SCORE: 10
ADLS_ACUITY_SCORE: 10
EATING/SWALLOWING: SWALLOWING LIQUIDS;SWALLOWING SOLID FOOD
ADLS_ACUITY_SCORE: 12
ADLS_ACUITY_SCORE: 10
WEAR_GLASSES_OR_BLIND: NO
ADLS_ACUITY_SCORE: 10
ADLS_ACUITY_SCORE: 12
ADLS_ACUITY_SCORE: 10
CONCENTRATING,_REMEMBERING_OR_MAKING_DECISIONS_DIFFICULTY: NO
ADLS_ACUITY_SCORE: 10
ADLS_ACUITY_SCORE: 12
ADLS_ACUITY_SCORE: 10
WALKING_OR_CLIMBING_STAIRS_DIFFICULTY: NO
ADLS_ACUITY_SCORE: 10
ADLS_ACUITY_SCORE: 12
ADLS_ACUITY_SCORE: 14
ADLS_ACUITY_SCORE: 10

## 2021-01-01 ASSESSMENT — MIFFLIN-ST. JEOR
SCORE: 1804.37
SCORE: 1781.44
SCORE: 1737.44
SCORE: 1655.57
SCORE: 1761.48
SCORE: 1644.68
SCORE: 1760.12
SCORE: 1782.8
SCORE: 1701.16
SCORE: 1804.37
SCORE: 1772.37
SCORE: 1756.04
SCORE: 1548.74
SCORE: 1642.42
SCORE: 1783.5
SCORE: 1777.15
SCORE: 1748.79

## 2021-01-01 ASSESSMENT — ENCOUNTER SYMPTOMS
ABDOMINAL DISTENTION: 1
ABDOMINAL PAIN: 1
SHORTNESS OF BREATH: 1
APPETITE CHANGE: 1
BACK PAIN: 1
CONSTIPATION: 1

## 2021-01-01 ASSESSMENT — LIFESTYLE VARIABLES: TOBACCO_USE: 0

## 2021-03-15 NOTE — LETTER
[place] [address] [place]  [address]  Phone: [phone]    March 22, 2021      Andre Serrano  [address] [facility] [address]              Dear Andre,       Here is a copy of your labs, we will discuss them at your upcoming visit.       Chacho Galan MD       Results for orders placed or performed in visit on 03/15/21   PSA Serum ([facility])     Status: None   Result Value Ref Range    PSA NG/ML 0.5 0.0 - 4.0 ng/mL    Narrative    Performed at:  01 - [facility] [address], [place], [place]  [id number]  : Cristian Raymundo MD, Phone:  [phone]   Testosterone  Serum ([facility])     Status: Abnormal   Result Value Ref Range    Testosterone Total <3 (L) 264 - 916 ng/dL    Narrative    Performed at:  01  [facility] [address], [place], [place]  [id number]  : Cristian Raymundo MD, Phone:  [phone]

## 2021-03-25 PROBLEM — F10.19 ALCOHOL ABUSE WITH UNSPECIFIED ALCOHOL-INDUCED DISORDER (H): Status: ACTIVE | Noted: 2021-01-01

## 2021-03-25 PROBLEM — C79.51 BONE METASTASIS: Status: ACTIVE | Noted: 2021-01-01

## 2021-03-25 NOTE — PROGRESS NOTES
"Previous diagnoses of Prostate Ca, due for follow up PSA,  No current symptoms.  PSA   Date Value Ref Range Status   03/21/2020 713.00 (H) 0 - 4 ug/L Final     Comment:     Assay Method:  Chemiluminescence using Siemens Vista analyzer        Problem(s) Oriented visit      ROS:  5 point ROS completed and negative except noted above, including Gen, CV, Resp, GI, MS     HISTORY:   reports current alcohol use of about 16.7 standard drinks of alcohol per week.   reports that he is a non-smoker but has been exposed to tobacco smoke. He has never used smokeless tobacco.    Past Medical History:   Diagnosis Date     Cancer (H)      Heavy alcohol use      Hypercholesteremia      Lower urinary tract symptoms (LUTS)      Past Surgical History:   Procedure Laterality Date     APPENDECTOMY  1972     HERNIORRHAPHY INGUINAL INFANT         EXAM:  BP: 128/82   Pulse: 83    Temp: Data Unavailable    Wt Readings from Last 2 Encounters:   03/25/21 94.1 kg (207 lb 6.4 oz)   05/14/20 97.5 kg (215 lb)       BMI= Body mass index is 33.99 kg/m .    EXAM:  APPEARANCE: = Relaxed and in no distress  SKIN: suspicious lesion on the left shoulder      Assessment/Plan:  Andre was seen today for recheck medication and results.    Diagnoses and all orders for this visit:    Prostate cancer (H)    Bone metastasis (H)    Alcohol abuse with unspecified alcohol-induced disorder (H)    FRANK (obstructive sleep apnea)    Screening for heart disease  -     Lipid Panel ([facility])        COUNSELING:   reports that he is a non-smoker but has been exposed to tobacco smoke. He has never used smokeless tobacco.    Estimated body mass index is 33.99 kg/m  as calculated from the following:    Height as of this encounter: 1.664 m (5' 5.5\").    Weight as of this encounter: 94.1 kg (207 lb 6.4 oz).       Appropriate preventive services were discussed with this patient, including applicable screening as appropriate for cardiovascular disease, diabetes, " osteopenia/osteoporosis, and glaucoma.  As appropriate for age/gender, discussed screening for colorectal cancer, prostate cancer, breast cancer, and cervical cancer. Checklist reviewing preventive services available has been given to the patient.    Reviewed patients plan of care and provided an AVS. The Basic Care Plan (routine screening as documented in Health Maintenance) for Andre meets the Care Plan requirement. This Care Plan has been established and reviewed with the  Patient.      The following health maintenance items are reviewed in Epic and correct as of today:  Health Maintenance   Topic Date Due     ADVANCE CARE PLANNING  Never done     Pneumococcal Vaccine: Pediatrics (0 to 5 Years) and At-Risk Patients (6 to 64 Years) (2 of 4 - PPSV23) 06/25/2020     Pneumococcal Vaccine: 65+ Years (2 of 4 - PPSV23) 06/25/2020     INFLUENZA VACCINE (1) 09/01/2020     MEDICARE ANNUAL WELLNESS VISIT  09/10/2020     COVID-19 Vaccine (2 - Moderna 2-dose series) 03/31/2021     FALL RISK ASSESSMENT  04/03/2021     COLORECTAL CANCER SCREENING  01/21/2023     LIPID  09/10/2024     DTAP/TDAP/TD IMMUNIZATION (2 - Td) 06/25/2025     HEPATITIS C SCREENING  Completed     PHQ-2  Completed     ZOSTER IMMUNIZATION  Completed     AORTIC ANEURYSM SCREENING (SYSTEM ASSIGNED)  Completed     IPV IMMUNIZATION  Aged Out     MENINGITIS IMMUNIZATION  Aged Out     HEPATITIS B IMMUNIZATION  Aged Out       Chacho Galan  [facility]  For any issues my office # is [phone]

## 2021-03-25 NOTE — LETTER
[place] [address] [place]  [address]  Phone: [phone]    March 26, 2021      Andre Serrano  [address] [facility] [address]              Dear Andre,     I am writing to report that your included test results are within expected ranges. I do not suggest that we make any changes at this time.       Chacho Galan M.D.     Results for orders placed or performed in visit on 03/25/21   Lipid Panel ([facility])     Status: Abnormal   Result Value Ref Range    Cholesterol 208 (H) 100 - 199 mg/dL    Triglycerides 102 0 - 149 mg/dL    HDL Cholesterol 76 >39 mg/dL    VLDL Cholesterol Alvin 18 5 - 40 mg/dL    LDL Cholesterol Calculated 114 (H) 0 - 99 mg/dL    LDL/HDL Ratio 1.5 0.0 - 3.6 ratio    Narrative    Performed at:  01 - [facility] [address], [place], [place]  [id number]  : Cristian Raymundo MD, Phone:  [phone]

## 2021-04-20 NOTE — LETTER
[facility]  [address] [place]  [address]  Phone: [phone]    June 4, 2021      Andre Serrano  [address] [facility] [address]              Dear Andre,       I am writing to report that your included test results are within expected ranges. I do not suggest that we make any changes at this time.       Chacho Galan M.D.     Results for orders placed or performed in visit on 04/20/21   Pathology Report ([facility])     Status: None   Result Value Ref Range    . Comment     Clinician Provided ICD10 Comment     . Comment     . Comment     . Comment     . Comment     . Comment     Pathologist Provided ICD10 Comment     . Comment     Narrative    Performed at:  [address]  [id number]  : Sravan Guzman MD, Phone:  [phone]  Specimen Comment: A duplicate report has been generated due to demographic update  Specimen Comment: of the patient's Date of Birth, Age, Gender, and/or Specimen Date.  Specimen Comment: Please review patient results, reference intervals, and calculated  Specimen Comment: results that may have been affected by this change.

## 2021-04-20 NOTE — PROGRESS NOTES
SUBJECTIVE:   Andre Serrano is a 65 year old male who presents today for lesion(s) excision. He has 1 lesion(s) that he would like removed. They have been changing. They have become irritated over time.      OBJECTIVE:   Exam shows a brown lesion(s), are not raised, on the left upper back. There is evidence of irritation with surrounding redness. It is 0.9cm in diameter.      Discussed with Andre regarding technique, risk of infection, and scarring. Betadine is used for cleansing. Lidocaine with epinephrine is used for anesthesia. The lesion(s)were removed with an elliptical excision. 7 sutures of 4-0 vicril are burried and 5-0 nylon are then placed in two layers. Bandage applied. Andre tolerated procedure well. No complications.    ASSESSMENT:   lesion(s) excision.    PLAN:   Specimens submitted to pathology. Wound care instructions given. Return in 10 days for suture removal.

## 2021-04-30 NOTE — PROGRESS NOTES
Patient presents for suture removal. The wound is well healed without signs of infection.  The sutures are removed. Return prn.  Pathology discussed.,  We had a mistake where the pathology report was sent on the wrong person.  That person had no pathology sent and we are resubmitting to [facility]  The good news is the cancer was all contained.  KN

## 2021-06-04 NOTE — RESULT ENCOUNTER NOTE
Dear Andre,   I am writing to report that your included test results are within expected ranges. I do not suggest that we make any changes at this time.  Chacho Galan M.D.  
right

## 2021-06-28 NOTE — PROGRESS NOTES
Study Result    NUCLEAR MEDICINE WHOLE BODY BONE SCAN March 23, 2020 10:57 AM     HISTORY: Bone lesion, pelvis, incidental on CT, malignancy suspected.     COMPARISON:       Prior bone scan: None.       Other relevant study: A CT of the abdomen and pelvis on  3/20/2020.     TECHNIQUE: Following the uneventful intravenous administration of 26.8  mCi Tc99m MDP IV, routine delayed imaging was performed of the entire  body.     IMAGES OBTAINED: Planar whole body anterior/posterior images and  planar head/neck oblique images.     FINDINGS: There are numerous foci of increased radiotracer uptake  consistent with diffuse osseous metastatic disease. This includes  involvement of the majority of the spine as well as several bilateral  ribs, both proximal humeri, both proximal femurs, and the distal left  femur. There is also increased uptake within the sacrum bilaterally.  Increased uptake adjacent to the wrists and knees is likely  degenerative in nature. There is also degenerative uptake in both  feet. No other abnormal osseous uptake is demonstrated. There appears  to be bilateral hydronephrosis. No other soft tissue abnormality is  seen.     ABDOMINAL   PAIN     Onset:  A month    Description:   Character: Dull ache  Location: comes from the flanks into the middle  Radiation: None    Intensity: moderate    Progression of Symptoms: intermittent    Accompanying Signs & Symptoms:  Fever/Chills: no   Nausea: no   Vomiting: no   Diarrhea: no   Dysuria or Hematuria: no    History:   Trauma: no   Previous similar pain: no    Previous tests done: lots of imagin    Precipitating factors:   Does the pain change with:     Food: no      BM: YES helps    Urination: has a cooper    Alleviating factors:         Therapies Tried and outcome:     Problem(s) Oriented visit      ROS:  General and Resp. completed and negative except as noted above     HISTORY:   reports current alcohol use of about 16.7 standard drinks of alcohol per  week.   reports that he is a non-smoker but has been exposed to tobacco smoke. He has never used smokeless tobacco.    Past Medical History:   Diagnosis Date     Cancer (H)      Heavy alcohol use      Hypercholesteremia      Lower urinary tract symptoms (LUTS)      Past Surgical History:   Procedure Laterality Date     APPENDECTOMY  1972     HERNIORRHAPHY INGUINAL INFANT         EXAM:  BP: 110/72   Pulse: 84    Temp: Data Unavailable    Wt Readings from Last 2 Encounters:   06/28/21 89.7 kg (197 lb 12.8 oz)   04/30/21 92.8 kg (204 lb 8 oz)       BMI= Body mass index is 32.42 kg/m .    EXAM:  APPEARANCE: = Relaxed and in no distress  Conj/Eyelids = noninjected and lids and lashes are without inflammation  PERRLA/Irises = Pupils Round Reactive to Light and Irisis without inflammation  Neck = No anterior or posterior adenopathy appreciated.  Thyroid = Not enlarged and no masses felt  Resp effort = Calm regular breathing  Breath Sounds = Good air movement with no rales or rhonchi in any lung fields  Heart Rate, Rythym, & sounds (no Murm)  = Regular rate and rythym with no S3, S4, or murmer appreciated.  Carotid Art's = Pulses full and equal and no bruits appreciated  Abdomen = Soft, nontender, no masses, & bowel sounds in all quadrants  Liver/Spleen = Normal span and no splenomegaly noted  Digits and Nails = FROM in all finger joints, no nail dystrophy  Ext (edema) = No pretibial edema noted or elsewhere  Musculsktl =  Strength and ROM of major joints are within normal limits  SKIN = absent significant rashes or lesions   Recent/Remote Memory = Alert and Oriented x 3  Mood/Affect = Cooperative and interested      Assessment/Plan:  Andre was seen today for gastrointestinal problem.    Diagnoses and all orders for this visit:    Bone metastasis (H)  -     Comp. Metabolic Panel (14) ([facility])  -     CBC with Diff/Plt ([facility])    Prostate cancer (H)  -     Comp. Metabolic Panel (14) ([facility])  -     CBC with Diff/Plt  "([facility])    Abdominal pain, generalized    I spent >40 min spent with patient, more than 50% spent on discussion/education/planning - as outlined in assessment and plan        COUNSELING:   reports that he is a non-smoker but has been exposed to tobacco smoke. He has never used smokeless tobacco.    Estimated body mass index is 32.42 kg/m  as calculated from the following:    Height as of 4/30/21: 1.664 m (5' 5.5\").    Weight as of this encounter: 89.7 kg (197 lb 12.8 oz).       Appropriate preventive services were discussed with this patient, including applicable screening as appropriate for cardiovascular disease, diabetes, osteopenia/osteoporosis, and glaucoma.  As appropriate for age/gender, discussed screening for colorectal cancer, prostate cancer, breast cancer, and cervical cancer. Checklist reviewing preventive services available has been given to the patient.    Reviewed patients plan of care and provided an AVS. The Basic Care Plan (routine screening as documented in Health Maintenance) for Andre meets the Care Plan requirement. This Care Plan has been established and reviewed with the  Patient.      The following health maintenance items are reviewed in Epic and correct as of today:  Health Maintenance   Topic Date Due     ADVANCE CARE PLANNING  Never done     Pneumococcal Vaccine: 65+ Years (2 of 4 - PPSV23) 06/25/2020     MEDICARE ANNUAL WELLNESS VISIT  09/10/2020     INFLUENZA VACCINE (Season Ended) 09/01/2021     FALL RISK ASSESSMENT  03/25/2022     COLORECTAL CANCER SCREENING  01/21/2023     DTAP/TDAP/TD IMMUNIZATION (2 - Td) 06/25/2025     LIPID  03/25/2026     HEPATITIS C SCREENING  Completed     PHQ-2  Completed     ZOSTER IMMUNIZATION  Completed     AORTIC ANEURYSM SCREENING (SYSTEM ASSIGNED)  Completed     COVID-19 Vaccine  Completed     IPV IMMUNIZATION  Aged Out     MENINGITIS IMMUNIZATION  Aged Out     HEPATITIS B IMMUNIZATION  Aged Out       Chacho Galan  [facility]  For " any issues my office # is [phone]

## 2021-09-01 NOTE — CONFIDENTIAL NOTE
Patient calls reporting 2 days of sore throat, runny nose and cough. Fever: hasn't checked but reports in and out of blankets. Taste and smell intact. Some GI upset but this is baseline.   Being treated by oncology for Stage 4 bone cancer and by urology for bladder cancer.  No known exposure to COVID.   Moderna COVID vaccine: 3/3/21 and 3/31/21  Patient asking if should be on antibiotics.   Plan: recommend COVID testing today. Order placed in [facility] and scheduled testing to be done at [facility] today at 305pm.   Discussed home treatment for viral symptoms. Discussed signs and symptoms of concern and to ED if these present.   Nurse will watch for results to be available tomorrow, review result and plan with Dr. Galan and call patient with plan.   Patient agrees.   Babita Crabtree RN

## 2021-09-02 NOTE — PROGRESS NOTES
"    Video Problem(s) Oriented visit      Video Start Time: 2:14 PM    The patient has been notified of following:     \"This video visit will be conducted via a call between you and your physician/provider. We have found that certain health care needs can be provided without the need for an in-person physical exam.  This service lets us provide the care you need with a video conversation.  If a prescription is necessary we can send it directly to your pharmacy.  If lab work is needed we can place an order for that and you can then stop by our lab to have the test done at a later time.    Video visits are billed at different rates depending on your insurance coverage.  Please reach out to your insurance provider with any questions.    If during the course of the call the physician/provider feels a video visit is not appropriate, you will not be charged for this service.\"    Patient has given verbal consent for Video visit? Yes    How would you like to obtain your AVS? MyChart    Will anyone else be joining your video visit? No  SUBJECTIVE:                                                    Andre Serrano is a 65 year old male who presents to clinic today for the following health issues :    Sleep  Sleep trouble lately Middle insomnia, began , happens every night, has been taking prednisone.  Also takes None.    Constipation -    Onset: 4-5 month(s) ago    Description:   Frequency of bowel movements: every rare  days    Accompanying Signs & Symptoms:   Abdominal pain: no  Blood in stool: no  Rectal pain: no  Nausea/vomiting: YES  Fevers: no  Weight loss or gain: YES- from cancer treatment    Precipitating and/or Alleviating factors:    Exercising daily: NO  Recent use of Narcotics, anticholinergics, calcium channel blockers, antacids, or iron supplements: no   Any previous tests: colonoscopy   History of abdominal surgery: no  Chronic Laxative Use YES- on 8 senna a day from oncology    Therapies tried and outcome:  " with  minor relief            Problem list, Medication list, Allergies, and Medical/Social/Surgical histories reviewed in [facility] and updated as appropriate.   Additional history: as documented    ROS:  General and Resp. completed and negative except as noted above  Recent cold symptoms, neg covid and feeling better.  Histories:   Patient Active Problem List   Diagnosis     Family history of melanoma     History of elevated PSA     FRANK (obstructive sleep apnea)     Hydronephrosis     Acute kidney injury (ANITA) with acute tubular necrosis (ATN) (H)     ANITA (acute kidney injury) (H)     Bone metastasis (H)     Alcohol abuse with unspecified alcohol-induced disorder (H)     Past Surgical History:   Procedure Laterality Date     APPENDECTOMY  1972     HERNIORRHAPHY INGUINAL INFANT         Social History     Tobacco Use     Smoking status: Passive Smoke Exposure - Never Smoker     Smokeless tobacco: Never Used     Tobacco comment: occasion   Substance Use Topics     Alcohol use: Yes     Alcohol/week: 16.7 standard drinks     Types: 20 Cans of beer per week     Family History   Problem Relation Age of Onset     Prostate Cancer Father      Brain Cancer Father      Uterine Cancer Mother      Coronary Artery Disease Maternal Grandfather      Diabetes No family hx of            OBJECTIVE:                                                    There were no vitals taken for this visit.  There is no height or weight on file to calculate BMI.   APPEARANCE: = Relaxed and in no distress     ASSESSMENT/PLAN:                                                        Andre was seen today for viral infection.    Diagnoses and all orders for this visit:    Drug-induced constipation    try some Mag Citrate    See Patient Instructions  There are no Patient Instructions on file for this visit.    The following health maintenance items are reviewed in Epic and correct as of today:  Health Maintenance   Topic Date Due     ADVANCE CARE PLANNING  Never  done     Pneumococcal Vaccine: 65+ Years (2 of 4 - PPSV23) 06/25/2020     MEDICARE ANNUAL WELLNESS VISIT  09/10/2020     INFLUENZA VACCINE (1) 09/01/2021     FALL RISK ASSESSMENT  03/25/2022     COLORECTAL CANCER SCREENING  01/21/2023     DTAP/TDAP/TD IMMUNIZATION (2 - Td or Tdap) 06/25/2025     LIPID  03/25/2026     HEPATITIS C SCREENING  Completed     PHQ-2  Completed     ZOSTER IMMUNIZATION  Completed     AORTIC ANEURYSM SCREENING (SYSTEM ASSIGNED)  Completed     COVID-19 Vaccine  Completed     IPV IMMUNIZATION  Aged Out     MENINGITIS IMMUNIZATION  Aged Out     HEPATITIS B IMMUNIZATION  Aged Out     I spent >40 min spent with patient or in chart prep, note review or other work related to Andre Serrano's care, more than 50% spent on discussion/education/planning - as outlined in assessment and plan      Video-Visit Details    This visit is being conducted as a virtual visit due to the emphasis on mitigation of the COVID-19 virus pandemic.   Type of service:  Video Visit    Originating Location (pt. Location): Home    Distant Location (provider location):  [facility]     Platform used for Video Visit: Apple Facetime    Chacho Galan MD  [facility]  Family Practice  [facility]  [phone]    Video End Time:2:48 PM  For any issues my office # is [phone]

## 2021-09-10 NOTE — PATIENT INSTRUCTIONS
Constipation  What is constipation?   Constipation means that bowel movements are infrequent and hard to pass and cause you to strain during bowel movements. It is not considered to be constipation if the bowel movement is not hard and difficult to pass.  What is the normal frequency of bowel movements for one person can be different for another person. For some people, 3 times a day is normal. For others once every 3 days may be normal. What's important is whether there is a change in what has been normal for you.     How is it treated?   To ease your constipation:    Drink more fluids.     Add more fiber to your diet, such as bran muffins, samia crackers, oatmeal, brown rice, whole wheat bread, fresh fruits and vegetables, and popcorn.     Get more exercise.     Make sure that you go to the bathroom whenever you feel that you need to go. Don t wait.   Many people find fiber supplements, such as Metamucil, Citrucel, or other psyllium products, to be helpful. Take a table spoon in 8 ounces of water daily.    Other times Oncotic agents like Miralax (17 grams/dose) daily can be helpful to assist in daily stooling.    How can I take care of myself?   To help take care of yourself:    Eat fresh vegetables and fruit every day.     Exercise regularly. For example, if you are able, walk for at least 30 minutes every day. Check with your healthcare provider before adding any new exercise.     Drink prune juice or eat stewed fruits at breakfast.     Drink enough liquids each day to keep your urine light yellow in color.     Increase the whole-grain fiber in your diet by eating cereals with 5 or more grams of fiber per serving (for example, shredded wheat or bran flakes).

## 2021-09-10 NOTE — PROGRESS NOTES
"    Video Problem(s) Oriented visit      Video Start Time: 9:14 AM    The patient has been notified of following:     \"This video visit will be conducted via a call between you and your physician/provider. We have found that certain health care needs can be provided without the need for an in-person physical exam.  This service lets us provide the care you need with a video conversation.  If a prescription is necessary we can send it directly to your pharmacy.  If lab work is needed we can place an order for that and you can then stop by our lab to have the test done at a later time.    Video visits are billed at different rates depending on your insurance coverage.  Please reach out to your insurance provider with any questions.    If during the course of the call the physician/provider feels a video visit is not appropriate, you will not be charged for this service.\"    Patient has given verbal consent for Video visit? Yes    How would you like to obtain your AVS? Mail a copy    Will anyone else be joining your video visit? No   Video-Visit Details      Getting over the cold     Stomach is bloated,  Tried mag citrate helped a little   Eating a little,  On the senna  Assessment/Plan:  Andre was seen today for gastrointestinal problem, constipation, cold symptoms and sleep problem.    Diagnoses and all orders for this visit:    Sleep disorder  -     traZODone (DESYREL) 50 MG tablet; TAKE 1-2 TABLETS AT BEDTIME    Bone metastasis (H)    Constipation, unspecified constipation type    Prostate cancer (H)    I spent >40 min spent with patient, more than 50% spent on discussion/education/planning - as outlined in assessment and plan      Chacho Galan MD   [facility]  [phone]        This visit is being conducted as a virtual visit due to the emphasis on mitigation of the COVID-19 virus pandemic.   Type of service:  Video Visit    Originating Location (pt. Location): " Home    Distant Location (provider location):  [facility]     Platform used for Video Visit: Apple Facetime    Chacho Galan MD  [facility]  Family Practice  [facility]  [phone]    Video End Time:9:44 AM  For any issues my office # is [phone]                Constipation  What is constipation?   Constipation means that bowel movements are infrequent and hard to pass and cause you to strain during bowel movements. It is not considered to be constipation if the bowel movement is not hard and difficult to pass.  What is the normal frequency of bowel movements for one person can be different for another person. For some people, 3 times a day is normal. For others once every 3 days may be normal. What's important is whether there is a change in what has been normal for you.     How is it treated?   To ease your constipation:    Drink more fluids.     Add more fiber to your diet, such as bran muffins, samia crackers, oatmeal, brown rice, whole wheat bread, fresh fruits and vegetables, and popcorn.     Get more exercise.     Make sure that you go to the bathroom whenever you feel that you need to go. Don t wait.   Many people find fiber supplements, such as Metamucil, Citrucel, or other psyllium products, to be helpful. Take a table spoon in 8 ounces of water daily.    Other times Oncotic agents like Miralax (17 grams/dose) daily can be helpful to assist in daily stooling.    How can I take care of myself?   To help take care of yourself:    Eat fresh vegetables and fruit every day.     Exercise regularly. For example, if you are able, walk for at least 30 minutes every day. Check with your healthcare provider before adding any new exercise.     Drink prune juice or eat stewed fruits at breakfast.     Drink enough liquids each day to keep your urine light yellow in color.     Increase the whole-grain fiber in your diet by eating cereals with 5 or more grams of fiber  per serving (for example, shredded wheat or bran flakes).

## 2021-09-15 PROBLEM — E87.1 HYPONATREMIA: Status: ACTIVE | Noted: 2021-01-01

## 2021-09-15 NOTE — ED TRIAGE NOTES
Pt has hx of prostate CA and bone CA.  For past few days pt has distended abd and pain.  Bilateral edema, SOB.  Pt was sent from outpatient CT.

## 2021-09-15 NOTE — ED PROVIDER NOTES
History   Chief Complaint:  Abdominal Pain       HPI   Andre Serrano is a 65 year old male with history of prostate cancer with bone metastasis, hypercholesterolemia, and FRANK who presents with three months of worsening abdominal pain radiating to his back, abdominal distention, shortness of breath, loss of appetite, and constipation. He has been taking senna without relief. He has also had one week of bilateral leg edema. He denies chest pain. He had a abdominal and pelvic CT done earlier today and the patient brought in a disc.  Was told by his oncologist if not improving to be evaluated.  Shortness of breath is present on exertion even with moving around the house.      Review of Systems   Constitutional: Positive for appetite change.   Respiratory: Positive for shortness of breath.    Cardiovascular: Positive for leg swelling. Negative for chest pain.   Gastrointestinal: Positive for abdominal distention, abdominal pain and constipation.   Musculoskeletal: Positive for back pain.   All other systems reviewed and are negative.    Allergies:  The patient has no known allergies.     Medications:  Trazodone  Senexon  Zytia  Eligard  Zofran  Miralax  Prednisone     Past Medical History:    Prostate cancer with bone metastasis   Alcohol abuse  Hypercholesterolemia   FRANK  Acute kidney injury  Hydronephrosis      Past Surgical History:    Appendectomy  Herniorrhaphy inguinal      Family History:    Father - prostate cancer, brain cancer  Mother - uterine cancer    Social History:  Patient presents alone.  His oncologist is Dr. Nguyen through [facility].  His PCP is Dr. Galan.    Physical Exam     Patient Vitals for the past 24 hrs:   BP Temp Temp src Pulse Resp SpO2   09/15/21 1845 -- -- -- 86 -- --   09/15/21 1830 -- -- -- 89 -- --   09/15/21 1815 -- -- -- 87 -- --   09/15/21 1802 -- -- -- -- -- 99 %   09/15/21 1800 (!) 147/99 -- -- 101 -- --   09/15/21 1715 -- -- -- 101 -- --   09/15/21 1645 -- -- -- 81 -- --    09/15/21 1630 -- -- -- 82 -- --   09/15/21 1615 -- -- -- 80 22 --   09/15/21 1600 -- -- -- 85 17 --   09/15/21 1545 -- -- -- 85 18 --   09/15/21 1530 -- -- -- 90 -- --   09/15/21 1445 -- -- -- 79 10 98 %   09/15/21 1430 -- -- -- 83 12 --   09/15/21 1410 93/63 97.2  F (36.2  C) Temporal 84 16 100 %       Physical Exam  Eyes:               Sclera white; Pupils are equal and round  ENT:                External ears and nares normal  CV:                  Rate as above with regular rhythm                           Marked BLE pitting edema to knees, trace on upper legs  Resp:               Breath sounds clear and equal bilaterally, no crackles                          Non-labored, no retractions or accessory muscle use while in the gurney                          No cough  GI:                   Abdomen is softly distended, LLQ tenderness                          No rebound tenderness or peritoneal features  :                  Pride in place, yellow urine.  No CVA tenderness.    MS:                  Moves all extremities, L low back tenderness w/o midline tenderness  Skin:                Warm and dry  Neuro:             Speech is normal and fluent. No apparent deficit.    Emergency Department Course   ECG  ECG taken at 1522, ECG read at 1533  Normal sinus rhythm. Right bundle branch block. Abnormal ECG.   No significant changes as compared to prior, dated 3/21/2020.  Rate 83 bpm. CT interval 136 ms. QRS duration 130 ms. QT/QTc 390/458 ms. P-R-T axes 20 47 53.     Imaging:  Outside CT Abdomen Pelvis at [facility]:  Discussed with radiology, see details in MDM    CT Chest PE w Contrast  1.  No pulmonary embolism.     2.  Bulky upper abdominal retroperitoneal and mesenteric lymphadenopathy. Multiple small bilateral pulmonary nodules compatible with metastases. Small left pleural effusion. Mediastinal and hilar lymphadenopathy.     3.  Multiple sclerotic metastatic lesions.  As read by Radiology.    XR Chest 2  Views  Small left pleural effusion is new. Otherwise negative  chest. No pulmonary edema.  As read by Radiology.    Laboratory:  BMP: sodium 124 (L), chloride 92 (L), creatinine 1.30 (H), calcium 11.8 (H), GFR estimate 57 (L), o/w WNL    Hepatic Panel: albumin 2.4 (L), alkaline phosphatase 164 (H), AST 55 (H), o/w WNL     CBC: WBC 14.5 (H), HGB 10.1 (L),  (H)     Lipase: 95    Lactic acid (result time 1532) 1.2     Troponin (Collected 1428): <0.015    BNP: 902 (H)    UA with microscopic: blood - trace (A), protein albumin 30 (A), nitrite - positive (A), leukocyte esterase - large (A), bacteria - many (A), RBC 5 (H), WBC 22 (H), o/w WNL     Urine Culture: pending    Asymptomatic COVID19 Virus PCR by nasopharyngeal swab: negative    Emergency Department Course:    Reviewed:  I reviewed nursing notes, vitals and past medical history    Assessments:  1426 I obtained history and examined the patient as noted above.     Consults:   1537 I spoke with the Dr. Vilchis from [facility] who read the patient's outpatient CT from earlier today.  1435 I spoke with Dr. Turner from [facility].  1850 I spoke with Dr. Montalvo of the hospitalist service at [facility].  Informed by charge nurse that we do not have a bed.  1951 I spoke with Joanie Jasso PA-C of the hospitalist service at [facility].  1959 I spoke with Joanie Jasso PA-C. They accept the patient for transfer.    Interventions:  1721 pink lady enema 286 mL rectal  1805 NS 1L IV Bolus  1806 Rocephin 1 g IV    Disposition:  The patient was transferred via EMS to [facility]. I spoke with Joanie Jasso PA-C who accepts the patient for transfer.    Patient remains in ED at time of shift change and was signed out to my partner.      Impression & Plan   CMS Diagnoses: None    Medical Decision Making:  Andre Serrano has a complex medical history who presents for evaluation after outside abdominal CT scan was done, results are not yet available. Differential includes  ascites, bowel obstruction, constipation, intraabdominal mass amongst others for his abdominal symptoms. The swelling in his legs may be related to CHF, liver failure, renal failure, fluid retention, or compression of the abdominal or pelvic veins. His shortness of breath may be secondary to pulmonary edema, pneumonia, pneumothorax, or pulmonary embolism. CT images were obtained from an outside facility and I was able to speak to the radiologist who mentioned massive retro-peritoneal lymphadenopathy as well as in the mesentery causing a renal obstruction at the level of the ureters. He also has mets to multiple locations. His blood work and EKG did not show etiology of his shortness of breath. His BNP was noted to be elevated but not sufficiently that I would assume that this is all related to CHF. Furthermore he is not short of breath lying down which is often seen with pulmonary edema. CT of chest was obtained to look for pulmonary embolism. This was not found. He was given some IV fluids for his hyponatremia and his two contrast loads today. His bilateral hydronephrosis was discussed with his urology group. At this point his creatinine is near his baseline. There is small suggestion of an infection associated with his cooper catheter and his white cell count was up. He does not meet sepsis criteria but in the setting ureteral compression, antibiotics were recommended as pyelonephritis can be very severe if it occurs in this setting. He may end up needed stents or nephrostomy tubes. Constipation was treated with an enema for which stool output did occur. Ultimately we did not have bed at our hospital and [facility] does. His oncology group consults there though they have a different urology group. He was informed of this and remains comfortable with transfer.        Diagnosis:    ICD-10-CM    1. Hyponatremia  E87.1    2. Prostate cancer metastatic to multiple sites (H)  C61    3. Other hydronephrosis  N13.39    4.  Left-sided low back pain without sciatica, unspecified chronicity  M54.5    5. Constipation, unspecified constipation type  K59.00    6. Shortness of breath  R06.02          Scribe Disclosure:  I, Nick Hartman, am serving as a scribe at 2:26 PM on 9/15/2021 to document services personally performed by Donna Adhikari MD based on my observations and the provider's statements to me.          Donna Adhikari MD  09/15/21 2142

## 2021-09-15 NOTE — PHARMACY-ADMISSION MEDICATION HISTORY
Pharmacy Medication History  Admission medication history interview status for the 9/15/2021  admission is complete. See EPIC admission navigator for prior to admission medications     Location of Interview: Patient room  Medication history sources: Patient, Surescripts and pill bottles with patient    Significant changes made to the medication list:  Remove calcium - patient received notification from provider yesterday to stop  Add Metamucil    In the past week, patient estimated taking medication this percent of the time: greater than 90%    Additional medication history information:   none    Medication reconciliation completed by provider prior to medication history? No    Time spent in this activity: 20 minutes    Prior to Admission medications    Medication Sig Last Dose Taking? Auth Provider   abiraterone (ZYTIGA) 250 MG tablet Take 1,000 mg by mouth daily 9/15/2021 at am Yes Unknown, Entered By History   leuprolide (ELIGARD) 45 MG kit 45 mg every 6 months 5 months ago Yes Reported, Patient   ondansetron (ZOFRAN-ODT) 4 MG ODT tab Take 4 mg by mouth daily as needed  Past Month at Unknown time Yes Reported, Patient   polyethylene glycol (MIRALAX) 17 g packet Take 1 packet by mouth daily 9/14/2021 at Unknown time Yes Reported, Patient   predniSONE (DELTASONE) 5 MG tablet Take 5 mg by mouth 2 times daily  9/15/2021 at Unknown time Yes Reported, Patient   Psyllium 58.12 % PACK Take 1 packet by mouth 3 times daily 9/14/2021 at pm Yes Unknown, Entered By History   SENEXON-S 8.6-50 MG tablet TAKE TWO TABLETS BY MOUTH TWICE A DAY 9/14/2021 at Unknown time Yes Chacho Galan MD   traZODone (DESYREL) 50 MG tablet TAKE 1-2 TABLETS AT BEDTIME 9/14/2021 at Unknown time Yes Chacho Galan MD       The information provided in this note is only as accurate as the sources available at the time of update(s)

## 2021-09-16 NOTE — PLAN OF CARE
Pertinent assessments: Patient is A&Ox4. Patient complains of abdominal pain and fullness, denies medication.  Abdomen is distended and patient reported feeling constipated, PO senna and dulcolax suppository x1 given, was able to have a bowel movement this shift. Patient has a chronic Pride.     Major Shift Events: Phos protocol for 3 doses initiated.    Treatment Plan: IV fluids, Rocephin, pain management, HEMOC consult, urology consult. Neuros q4hrs. Biopsy scheduled for 9/17, hold Lovenox for 24 hrs and NPO 6 hrs prior to procedure.     Bedside Nurse: Celine Feng RN

## 2021-09-16 NOTE — H&P
[facility] [place]    History and Physical  Hospitalist       Date of Admission:  9/15/2021    Assessment & Plan   Andre Serrano is a 65 year old male who was sent from [facility] emergency room as a direct admission because they did not have any beds.    Patient has a past medical history significant for metastatic prostate cancer with bone metastasis (follows up with [facility]), constipation, dyslipidemia, sleep apnea.    Patient has been dealing with progressively worsening generalized abdominal pain, abdominal distention and constipation.  His oral intake has been very poor due to the symptoms.  He also gives a history of shortness of breath but not orthopnea.  He has been trying to deal with his constipation for the last many weeks with the help of his primary care physician but with little success.  He has tried fairly high dose of Senokot without any relief.  Milk of magnesia usually works for him a little bit.    He saw his oncologist yesterday and explained to him his symptoms of abdominal pain, distention and constipation.  He was supposed to get an IV infusion at that time but it was not given.  His oncologist recommended getting a CT scan which was done today.  The CT scan was done at an outside imaging facility and we do not have access to either the report or the images.  Because patient was not feeling quite well he finally decided to go to the emergency room at [facility].    Lab work done in the ER showed hyponatremia of 124 and hypercalcemia.  Creatinine was at baseline.  CT PE study was done which was negative.  The ER physician managed to talk to the radiologist who mentioned massive retroperitoneal lymphadenopathy as well as in the mesentery causing a ureteral obstruction.  We do not have the full radiology report.  Due to these findings as well as metabolic abnormalities, medical team admission was recommended.  Because they did not have any beds  patient was transferred to our facility.  He was also given 1 g of ceftriaxone due to pyuria.  Patient has a chronic Pride catheter in place which was changed on Monday in urology office.    Problem List:    Hypercalcemia.  -calcium level of 11.8, earlier today.  -Patient has other associated symptoms as well such as abdominal pain, constipation.  -Oral intake has been fairly poor and patient feels dehydrated.  Has dry mucous membranes.  -IV normal saline at 125 cc/h.  This may somewhat complicated the hyponatremia treatment.  -Repeat labs to be done now which would include renal panel and ionized calcium.  Recheck labs in the morning.  -Related to bony metastatic disease.  -Oncology consult would be appreciated.    Hyponatremia  -Check repeat labs now.  Also check serum and urine osmolalities, urine sodium level.  -Patient does have peripheral edema, edema of the lower extremities.  But clinically I do not think that he is volume overloaded.  He actually feels dry.  Oral intake has been poor.  -I think he is intravascularly dry although he does have peripheral edema which could be due to chronic prednisone use.  -For that reason, I am inclined to start him on normal saline and monitor the sodium.  Osmolalities will also help.  We may need to give Lasix for relative aquauresis and may be somewhat beneficial for hypercalcemia.    Ureteral obstruction due to abdominal lymphadenopathy.  -Patient has Pride in place and urine is flowing well.  -We first need to obtain the full radiological report tomorrow morning.  -Patient may eventually need nephrostomy tube placement but currently there does not appear to be an acute indication for that.    Metastatic prostate cancer.  -I am requesting a [facility] consultation.  -Bony metastasis is contributing towards hypercalcemia.  -Patient is on chronic prednisone 5 mg twice daily, continue.    Constipation  -MiraLAX.  As needed milk of magnesia which helps with his  symptoms.  May also use lactulose as needed.  -Secondary to hypercalcemia.    Pyuria.  Possible UTI.  -Ceftriaxone.  Await for urine culture.  Has a chronic indwelling Pride catheter so this may be colonization.  Leukocytosis at presentation.    COVID-19 test negative    DVT Prophylaxis: Pneumatic Compression Devices  Code Status: Full Code    Cristi Vazquez MD    Primary Care Physician   Chacho Galan    Chief Complaint   Constipation, abdominal pain.      History of Present Illness   Andre Serrano is a 65 year old male presented with constipation and abdominal pain.      Past Medical History    I have reviewed this patient's medical history and updated it with pertinent information if needed.   Past Medical History:   Diagnosis Date     Cancer (H)      Heavy alcohol use      Hypercholesteremia      Lower urinary tract symptoms (LUTS)        Past Surgical History   I have reviewed this patient's surgical history and updated it with pertinent information if needed.  Past Surgical History:   Procedure Laterality Date     APPENDECTOMY  1972     HERNIORRHAPHY INGUINAL INFANT         Prior to Admission Medications   Prior to Admission Medications   Prescriptions Last Dose Informant Patient Reported? Taking?   Psyllium 58.12 % PACK  Self Yes No   Sig: Take 1 packet by mouth 3 times daily   SENEXON-S 8.6-50 MG tablet   No No   Sig: TAKE TWO TABLETS BY MOUTH TWICE A DAY   abiraterone (ZYTIGA) 250 MG tablet   Yes No   Sig: Take 1,000 mg by mouth daily   leuprolide (ELIGARD) 45 MG kit   Yes No   Si mg every 6 months    ondansetron (ZOFRAN-ODT) 4 MG ODT tab   Yes No   Sig: Take 4 mg by mouth daily as needed    polyethylene glycol (MIRALAX) 17 g packet   Yes No   Sig: Take 1 packet by mouth daily   predniSONE (DELTASONE) 5 MG tablet   Yes No   Sig: Take 5 mg by mouth 2 times daily    traZODone (DESYREL) 50 MG tablet   No No   Sig: TAKE 1-2 TABLETS AT BEDTIME      Facility-Administered Medications: None     Allergies    No Known Allergies    Social History   I have reviewed this patient's social history and updated it with pertinent information if needed. Andre Serrano  reports that he is a non-smoker but has been exposed to tobacco smoke. He has never used smokeless tobacco. He reports current alcohol use of about 16.7 standard drinks of alcohol per week. He reports current drug use. Drug: Marijuana.    Family History   I have reviewed this patient's family history and updated it with pertinent information if needed.   Family History   Problem Relation Age of Onset     Prostate Cancer Father      Brain Cancer Father      Uterine Cancer Mother      Coronary Artery Disease Maternal Grandfather      Diabetes No family hx of        Review of Systems   The 10 point Review of Systems is negative other than noted in the HPI or here.     Physical Exam   Temp: 98.8  F (37.1  C) Temp src: Oral BP: (!) 148/88 Pulse: 101   Resp: 20 SpO2: 98 % O2 Device: None (Room air)    Vital Signs with Ranges  Temp:  [97.2  F (36.2  C)-98.8  F (37.1  C)] 98.8  F (37.1  C)  Pulse:  [] 101  Resp:  [10-25] 20  BP: ()/(63-99) 148/88  SpO2:  [94 %-100 %] 98 %  0 lbs 0 oz    Constitutional: Awake, alert, cooperative, no apparent distress.  Eyes: Conjunctiva and pupils examined and normal.  HEENT: Dry mucous membranes, normal dentition.  Respiratory: Clear to auscultation bilaterally, no crackles or wheezing.  Cardiovascular: Regular rate and rhythm, normal S1 and S2, and no murmur noted.  GI: Soft, abdominal distention.  Generalized mild tenderness without peritoneal signs or guarding.  No rebound nurse.  Skin: No rashes, no cyanosis, peripheral edema present.  Musculoskeletal: No joint swelling, erythema or tenderness.  Neurologic: Cranial nerves 2-12 intact, normal strength and sensation.  Psychiatric: Alert, oriented to person, place and time, no obvious anxiety or depression.    Data     Recent Labs   Lab 09/15/21  1428   WBC 14.5*   HGB  10.1*   MCV 93   *   *   POTASSIUM 4.2   CHLORIDE 92*   CO2 25   BUN 18   CR 1.30*   ANIONGAP 7   JAXSON 11.8*   GLC 94   ALBUMIN 2.4*   PROTTOTAL 7.2   BILITOTAL 0.5   ALKPHOS 164*   ALT 18   AST 55*   LIPASE 95   TROPONIN <0.015       Recent Results (from the past 24 hour(s))   XR Chest 2 Views    Narrative    XR CHEST 2 VW  9/15/2021 2:57 PM       INDICATION: shortness of breath on exertion w/anasarca  COMPARISON: 3/20/2020       Impression    IMPRESSION: Small left pleural effusion is new. Otherwise negative  chest. No pulmonary edema.    SB MORLYE MD         SYSTEM ID:  [id number]   CT Chest Pulmonary Embolism w Contrast    Narrative    EXAM: CT CHEST PULMONARY EMBOLISM W CONTRAST  LOCATION: [facility]  DATE/TIME: 9/15/2021 4:48 PM    INDICATION: Pain and shortness of breath.  COMPARISON: None.  TECHNIQUE: CT chest pulmonary angiogram during arterial phase injection of IV contrast. Multiplanar reformats and MIP reconstructions were performed. Dose reduction techniques were used.   CONTRAST: 72 mL Isovue-370.    FINDINGS:  ANGIOGRAM CHEST: Pulmonary arteries are normal caliber and negative for pulmonary emboli. Thoracic aorta is negative for dissection. No CT evidence of right heart strain.    LUNGS AND PLEURA: Small left pleural effusion. Multiple small bilateral pulmonary nodules compatible with metastases.    MEDIASTINUM/AXILLAE: Mild mediastinal and hilar lymphadenopathy.    CORONARY ARTERY CALCIFICATION: Mild.    UPPER ABDOMEN: Bulky upper retroperitoneal conglomerate lymphadenopathy which measures up to 12.5 x 6.3 cm. There is incompletely visualized left-sided hydronephrosis. The right kidney intrarenal collecting system is not seen on the images. Central   mesenteric lymphadenopathy.    MUSCULOSKELETAL: Multiple sclerotic metastatic lesions in the visualized skeleton.      Impression    IMPRESSION:  1.  No pulmonary embolism.    2.  Bulky upper abdominal  retroperitoneal and mesenteric lymphadenopathy. Multiple small bilateral pulmonary nodules compatible with metastases. Small left pleural effusion. Mediastinal and hilar lymphadenopathy.    3.  Multiple sclerotic metastatic lesions.

## 2021-09-16 NOTE — CONSULTS
CLINICAL NUTRITION SERVICES  -  ASSESSMENT NOTE      Recommendations Ordered by Registered Dietitian (RD):   Magic cup with meals PRN  Daily weights   Encouraged 1-2 protein items per meal, TID   Provided tips for adding extra calories and protein    Malnutrition:   % Weight Loss:  Up to 10% in 6 months (non-severe malnutrition)  % Intake:  </= 50% for >/= 1 month (severe malnutrition)  Subcutaneous Fat Loss:  Orbital region moderate depletion and Thoracic region mild depletion  Muscle Loss:  Temporal region mild depletion and Clavicle bone region mild depletion  Fluid Retention:  ?Mild BLE    Malnutrition Diagnosis: Non-Severe malnutrition (at least)  In Context of:  Acute on Chronic illness or disease        REASON FOR ASSESSMENT  Andre Serrano is a 65 year old male seen by Registered Dietitian for Provider Order - malnourished - cancer       NUTRITION HISTORY  Chart reviewed and discussed with patient and visitor  PMH includes metastatic prostate cancer (bone mets), constipation and dyslipidemia   Has had a decreased appetite for up to 6 months 2/2 medications side effects, taste changes and painfully distended abdomen   Meat has not been pallitable so patient acknowledges his struggle to consume adequate protein overall   Estimates lowest wt at 187 lbs ~1 month ago and has since crept up in weight with fluid     Typical recent intake includes:  Breakfast: prunes, yogurt or frosted mini wheats cereal   Lunch and dinner: plain or butter noodles vs freezer meal mac and cheese  Snacks: 1-2 Ensure/day (this fills him up easily so he hasn't been consuming as much)    CURRENT NUTRITION ORDERS  Diet Order:     Regular     Current Intake/Tolerance:  Appetite is poor, has only taken 50% of a small breakfast so far today  Assisted pt in lunch order --> penne pasta with side of marinara sauce, side of mac and cheese, x2 chocolate chip cookies, vanilla ice cream and trial of vanilla magic up       NUTRITION FOCUSED  "PHYSICAL ASSESSMENT FOR DIAGNOSING MALNUTRITION)  Yes         Observed:    Muscle wasting (refer to documentation in Malnutrition section), Subcutaneous fat loss (refer to documentation in Malnutrition section) and Fluid retention (edema observed in lower extremities)    Obtained from Chart/Interdisciplinary Team:  Bilateral lower extremity edema   Unsure of last BM PTA    ANTHROPOMETRICS  Height: 5' 9\"  Weight: 205 lbs 3.2 oz  Body mass index is 30.3 kg/m .  Weight Status:  Obesity Grade I BMI 30-34.9  IBW: 73 kg   % IBW: 127%  Weight History: Current wt up with fluid, suspect dry wt closer to 187 lbs per patient report. Using dry reported wt, it appears pt has lost ~20 lbs in the past 6 months 9.6% body wt)  Wt Readings from Last 10 Encounters:   09/16/21 93.1 kg (205 lb 3.2 oz)   06/28/21 89.7 kg (197 lb 12.8 oz)   04/30/21 92.8 kg (204 lb 8 oz)   04/20/21 93 kg (205 lb)   03/25/21 94.1 kg (207 lb 6.4 oz)   05/14/20 97.5 kg (215 lb)   05/05/20 103 kg (227 lb)   04/03/20 98.5 kg (217 lb 3.2 oz)   03/23/20 100 kg (220 lb 6.4 oz)   03/20/20 108 kg (238 lb)       LABS  Labs reviewed  Labs:  Electrolytes  Potassium (mmol/L)   Date Value   09/16/2021 4.1   09/16/2021 4.3   09/15/2021 4.2   06/28/2021 4.4   05/07/2020 3.8   05/06/2020 3.9     Phosphorus (mg/dL)   Date Value   09/16/2021 1.6 (L)   09/16/2021 1.7 (L)   05/06/2020 2.7    Blood Glucose  Glucose (mg/dL)   Date Value   09/16/2021 85   09/16/2021 87   09/15/2021 94   06/28/2021 102 (H)   05/07/2020 158 (H)   05/06/2020 109 (H)   05/06/2020 96   05/05/2020 111 (H)    Inflammatory Markers  WBC (10e9/L)   Date Value   05/06/2020 11.1 (H)   05/05/2020 11.2 (H)   03/23/2020 15.3 (H)     WBC Count (10e3/uL)   Date Value   09/16/2021 13.7 (H)   09/15/2021 14.5 (H)     WBC x10/cmm (x10/cmm)   Date Value   06/28/2021 10.7     Albumin (g/dL)   Date Value   09/16/2021 2.3 (L)   09/15/2021 2.4 (L)   06/28/2021 4.5   05/06/2020 2.7 (L)   05/05/2020 3.0 (L)      Magnesium " (mg/dL)   Date Value   09/16/2021 2.1   05/05/2020 2.6 (H)     Sodium (mmol/L)   Date Value   09/16/2021 126 (L)   09/16/2021 125 (L)   09/15/2021 124 (L)   06/28/2021 133 (L)   05/07/2020 138   05/06/2020 137    Renal  Urea Nitrogen (mg/dL)   Date Value   09/16/2021 16   09/16/2021 18   09/15/2021 18   06/28/2021 19   05/07/2020 32 (H)   05/06/2020 44 (H)     BUN/Creatinine Ratio (no units)   Date Value   06/28/2021 17     Creatinine (mg/dL)   Date Value   09/16/2021 1.15   09/16/2021 1.23   09/15/2021 1.30 (H)   06/28/2021 1.12   05/07/2020 1.53 (H)   05/06/2020 1.82 (H)     Additional  Triglycerides (mg/dL)   Date Value   03/25/2021 102   09/10/2019 143   06/25/2015 206 (H)     Ketones Urine (mg/dL)   Date Value   09/15/2021 Negative   05/06/2020 Negative          MEDICATIONS  Medications reviewed      ASSESSED NUTRITION NEEDS PER APPROVED PRACTICE GUIDELINES:    Dosing Weight 187 lbs (85 kg) - per pt reported dry wt  Estimated Energy Needs: 5947-9883 kcals (25-30 Kcal/Kg)  Justification: maintenance vs repletion   Estimated Protein Needs:  grams protein (1-1.2 g pro/Kg)  Justification: preservation of lean body mass and repletion   Estimated Fluid Needs: per MD      MALNUTRITION:  % Weight Loss:  Up to 10% in 6 months (non-severe malnutrition)  % Intake:  </= 50% for >/= 1 month (severe malnutrition)  Subcutaneous Fat Loss:  Orbital region moderate depletion and Thoracic region mild depletion  Muscle Loss:  Temporal region mild depletion and Clavicle bone region mild depletion  Fluid Retention:  ?Mild BLE    Malnutrition Diagnosis: Non-Severe malnutrition (at least)  In Context of:  Acute on Chronic illness or disease    NUTRITION DIAGNOSIS:  Inadequate oral intake related to decreased appetite and taste change with medications and metastatic disease as evidenced by intakes </=50% for >1 month, wt loss up to 10% in 6 months and e/o fat and muscle wasting meeting criteria for malnutrition       NUTRITION  INTERVENTIONS  Recommendations / Nutrition Prescription  Regular diet  Magic cup with meals PRN  Daily weights   Encouraged 1-2 protein items per meal, TID   Provided tips for adding extra calories and protein     Implementation  Nutrition education: Provided brief education on tips for adding calories and protein to the diet   Medical Food Supplement: as above   Collaboration and Referral of Nutrition care: patient was discussed during interdisciplinary team rounds       Nutrition Goals  Patient will consume at least 50% meals TID and 100% of 1 supplement/day       MONITORING AND EVALUATION:  Progress towards goals will be monitored and evaluated per protocol and Practice Guidelines      Ramona Giraldo RD, LD  Clinical Dietitian

## 2021-09-16 NOTE — CONSULTS
Urology Consult    Name:  Andre Serrano  MRN:  [id number]  Age/: 65 year old, 1955    CC: Metastatic prostate cancer with left ureteral obstruction    HPI: Andre Serrano is a(n) 65 year old male he is a known patient with prostatic prostate cancer.  He is under treatment with medical oncology in [place] [facility].  Currently on ADT with Zytiga.  PSA has been coming down in the past but now has evidence of clear progression on CT.  He complains of flank pain and lower abdominal pain.  Also complains of abdominal distention getting worse the last few days.  Decreased appetite and nausea.  CT which was done outside showed massive retroperitoneal lymphadenopathy with left ureteral obstruction with left hydronephrosis.   Past Medical History:  Past Medical History:   Diagnosis Date     Cancer (H)      Heavy alcohol use      Hypercholesteremia      Lower urinary tract symptoms (LUTS)        Past Surgical History:  Past Surgical History:   Procedure Laterality Date     APPENDECTOMY  1972     HERNIORRHAPHY INGUINAL INFANT         Allergies:   No Known Allergies    Medications:  [COMPLETED] 0.9% sodium chloride BOLUS  [COMPLETED] cefTRIAXone (ROCEPHIN) 1 g vial to attach to  mL bag for ADULTS or NS 50 mL bag for PEDS  [COMPLETED] iopamidol (ISOVUE-370) solution 72 mL  [COMPLETED] pink lady enema (COMPOUNDED: docusate, magnesium citrate, mineral oil, sodium phosphate)  [COMPLETED] SalineFlush    abiraterone (ZYTIGA) 250 MG tablet, Take 1,000 mg by mouth daily  leuprolide (ELIGARD) 45 MG kit, 45 mg every 6 months   ondansetron (ZOFRAN-ODT) 4 MG ODT tab, Take 4 mg by mouth daily as needed   polyethylene glycol (MIRALAX) 17 g packet, Take 1 packet by mouth daily  predniSONE (DELTASONE) 5 MG tablet, Take 5 mg by mouth 2 times daily   Psyllium 58.12 % PACK, Take 1 packet by mouth 3 times daily  SENEXON-S 8.6-50 MG tablet, TAKE TWO TABLETS BY MOUTH TWICE A DAY  traZODone (DESYREL) 50 MG tablet, TAKE 1-2 TABLETS  "AT BEDTIME          Family History:  Family History   Problem Relation Age of Onset     Prostate Cancer Father      Brain Cancer Father      Uterine Cancer Mother      Coronary Artery Disease Maternal Grandfather      Diabetes No family hx of        ROS:  The remainder of the complete ROS was negative unless noted in the HPI.    Exam:  /65 (BP Location: Left arm)   Pulse 109   Temp 98.6  F (37  C) (Oral)   Resp 18   Ht 1.753 m (5' 9\")   Wt 93.1 kg (205 lb 3.2 oz)   SpO2 97%   BMI 30.30 kg/m    General: Alert, interactive, & in NAD  Resp: CTAB, no crackles or wheezes  Cardiac: Regular rate; extremities warm;   Abdomen: Soft, mildly tender left side, mild distension.  No mass palpable.  : On Pride catheter regular exchanges.  Last exchange was done on Monday.  Extremities: No LE edema or obvious joint abnormalities  Skin: Warm and dry, no jaundice or rash    Labs:  Lab Results   Component Value Date    WBC 13.7 (H) 09/16/2021    WBC 14.5 (H) 09/15/2021    WBC 10.7 06/28/2021    HGB 8.6 (L) 09/16/2021    HGB 10.1 (L) 09/15/2021    HGB 14.0 06/28/2021    HCT 26.8 (L) 09/16/2021    HCT 31.7 (L) 09/15/2021    HCT 38.9 (A) 06/28/2021    MCV 93 09/16/2021    MCV 93 09/15/2021    MCV 92.1 06/28/2021     09/16/2021     (H) 09/15/2021     06/28/2021     Lab Results   Component Value Date    CR 1.15 09/16/2021    CR 1.23 09/16/2021    CR 1.30 (H) 09/15/2021     Lab Results   Component Value Date     (L) 09/16/2021     (L) 09/16/2021     (L) 09/15/2021    POTASSIUM 4.1 09/16/2021    POTASSIUM 4.3 09/16/2021    POTASSIUM 4.2 09/15/2021    CHLORIDE 95 09/16/2021    CHLORIDE 95 09/16/2021    CHLORIDE 92 (L) 09/15/2021    CO2 23 09/16/2021    CO2 27 09/16/2021    CO2 25 09/15/2021    GLC 85 09/16/2021    GLC 87 09/16/2021    GLC 94 09/15/2021       Imaging:   CT abdomen pelvis: 3/20/2020:  Moderate bilateral hydroureteronephrosis extending to the level of  the distal ureters " without focal transition point. There is moderate  urinary bladder retention and the hydronephrosis may be due to urinary  bladder outflow compromise/obstruction from the prostate (high  suspicion for prostate malignancy).  2.  Asymmetric left nephric stranding and edema is concerning for  superimposed ascending urinary tract infection.  3.  Retroperitoneal and iliac lymphadenopathy consistent with doc  metastatic disease and is favored to be from a primary prostate  malignancy. Additional differential considerations include rectal  malignancy, lymphoma, urothelial malignancy given the irregular  thickening in the posterior urinary bladder wall that is inseparable  from the prostate. Note that multiple lymph nodes are adjacent to the  ureters, however there is no high-grade ureteral caliber change to  suggest that the lymphadenopathy is a source of the obstruction.  4.  Nodularity in the mesial rectal fascia and lymphadenopathy along  the inferior mesenteric vasculature, is concerning for a component of  extramural vascular invasion.  5.  Sclerotic osseous metastatic disease.  6.  A few micronodules in the visualized lung bases, pulmonary  metastatic disease is not excluded.  CT chest pulmonary embolism with contrast: 9/15/2021:  IMPRESSION:  1.  No pulmonary embolism.     2.  Bulky upper abdominal retroperitoneal and mesenteric lymphadenopathy. Multiple small bilateral pulmonary nodules compatible with metastases. Small left pleural effusion. Mediastinal and hilar lymphadenopathy.     3.  Multiple sclerotic metastatic lesions.    Ct Abdomen and Pelvis: 9/15/2021  I reviewed the CT and abdomen pelvis images done yesterday which showed  bilateral moderate hydronephrosis with bilateral upper ureteral obstruction due to a massive retroperitoneal lymph node enlargement.      Assessment and Plan: Andre Serrano is a(n) 65 year old male metastatic prostate cancer with progression on ADT plus Zytiga.  Medical oncology  is following and has planned for possible lymph node biopsy from the metastatic site consideration for possible neuroendocrine differentiation in view of the lower PSA values in contrast to the large burden of metastasis.  He also has bilateral ureteral obstruction due to the same metastatic retroperitoneal lymphadenopathy.  Currently renal functions are normal creatinine is at 1.13.  Patient is being followed with Dr. Kolb at [place] [facility].  He should be considered for bilateral ureteral stent placement/nephrostomies with his primary urologist on a routine basis.  He may be considered for the same on an emergent basis with us in the event of worsening renal function  during the present hospital stay.    Jm Scott MD  [facility]

## 2021-09-16 NOTE — PHARMACY-ADMISSION MEDICATION HISTORY
Admission medication history interview status for this patient is complete. See [facility] admission navigator for allergy information, prior to admission medications and immunization status.     Medication history interview done, indicate source(s): Patient  Medication history resources (including written lists, pill bottles, clinic record):None  Pharmacy: [facility]    Changes made to PTA medication list:  Added: none  Changed: none  Reported as Not Taking: none  Removed: none    Actions taken by pharmacist (provider contacted, etc):None     Additional medication history information:None    Medication reconciliation/reorder completed by provider prior to medication history?  N   (Y/N)     Prior to Admission medications    Medication Sig Last Dose Taking? Auth Provider   abiraterone (ZYTIGA) 250 MG tablet Take 1,000 mg by mouth daily 9/15/2021 at 10:30 am Yes Unknown, Entered By History   leuprolide (ELIGARD) 45 MG kit 45 mg every 6 months  More than a month at 5 months ago Yes Reported, Patient   ondansetron (ZOFRAN-ODT) 4 MG ODT tab Take 4 mg by mouth daily as needed  Past Month at Unknown time Yes Reported, Patient   polyethylene glycol (MIRALAX) 17 g packet Take 1 packet by mouth daily 9/15/2021 at am Yes Reported, Patient   predniSONE (DELTASONE) 5 MG tablet Take 5 mg by mouth 2 times daily  9/15/2021 at Unknown time Yes Reported, Patient   Psyllium 58.12 % PACK Take 1 packet by mouth 3 times daily 9/15/2021 at Unknown time Yes Unknown, Entered By History   SENEXON-S 8.6-50 MG tablet TAKE TWO TABLETS BY MOUTH TWICE A DAY 9/15/2021 at Unknown time Yes Chacho Galan MD   traZODone (DESYREL) 50 MG tablet TAKE 1-2 TABLETS AT BEDTIME 9/15/2021 at PM Yes Chacho Galan MD

## 2021-09-16 NOTE — PROGRESS NOTES
Order for CT guided retroperitoneal lymph node biopsy reviewed and approved per Dr. Flores. Per radiology protocol lovenox will need to be held for 24 hours and NPO for four hours prior to exam. Will plan on procedure for 10:00 tomorrow Floor nurse notified in plan of care.

## 2021-09-16 NOTE — PROGRESS NOTES
3-Hospitalist Huddle Documentation    Acuity/Preferred Bed Type: medical floor  Infection Concerns: none  Additional Specialist Needed Or Specialist Already Contacted:   None  Timely Treatments Needed: No  Important things to know/address during hospitalization: sitter not needed      Clinic: [facility]  Provider: Dr. Adhikari  Dx: Hyponatremia    Hx of metastatic prostate cancer presented with abdominal distension, shortness of breath and lower leg edema    Has cooper due to prostate cancer. Outpatient CT scan of abdomen showed extensive retroperitoneal and mesenteric adenopathy blocking his ureters but is still urinating    Hyponatremic thought to be due to third spacing    Shortness of breath, CT chest showed no PE. Troponin negative.     Started Ceftriaxone for UTI. .

## 2021-09-16 NOTE — CONSULTS
[facility]    Hematology / Oncology Consultation     Date of Admission:  9/15/2021    Assessment & Plan   Andre Serrano is a 65 year old male who was admitted on 9/15/2021. I was asked to see the patient for history of metastatic prostate cancer.     Assessment:  Plan:  1.  Metastatic prostate cancer with second metastasis to lymph nodes and bones:  -Initial diagnosis in March 2020  -Current treatment is ADT plus abiraterone  -PSA from 9/14/2021 was 1.4  -However CT scan findings show clear progression with massive enlargement of intra-abdominal lymphadenopathy.     Plan   -Discussed with Dr. Nguyen, the discordant findings between PSA and CT scan is worrisome for either neuroendocrine differentiation.  Also possibility of a different histology.  -Patient should have biopsy from a metastatic lymph node lymph node.  -Likely would require change in treatment to chemotherapy.     2.  Bilateral hydronephrosis  -Please consult urology to see need for stent placement.     3.  Hypercalcemia  -Continue with IV hydration  -We will give 1 dose of Zometa.     4.  Anemia  -Likely secondary to do blastic disease    Plan   -We will transfuse with hemoglobin less than 7.0       Christo Jasso MD   [facility]  Office:  [phone]    Code Status    Full Code    Reason for Consult   Reason for consult: History of metastatic prostate cancer    Primary Care Physician   Chacho Galan    Chief Complaint     Abdominal pain.       History of Present Illness   Andre Serrano is a 65 year old male who presents with abdominal pain.  Patient has past medical history significant for metastatic prostate cancer, he follows with Dr. Dariel Nguyen.  He had previous history of elevated PSA.  Diagnosis was confirmed in March 2020 when he presented with rising PSA as well as metastatic intra-abdominal lymphadenopathy, and bone metastasis.   Lymph node biopsy from 2/23/2020 confirmed metastatic adenocarcinoma.  He was  initially started on ADT, and abiraterone was added in May 2020 which is his current treatment.  He has had a good PSA response going down from an initial value of 713 in March 2020 to less than 0.5 in March 2021.   Patient reported that approximately 3 months ago he started experiencing new symptoms of abdominal pain.  He describes this as distention.  He had difficulty with bowels experiencing more constipation.  Symptoms were progressive.  He met with Dr. Nguyen recently, laboratory studies show a PSA of 1.4 however his calcium was elevated and because of his abdominal symptoms a CT scan of abdomen pelvis was requested which patient completed on 9/14/2021.  The CT scan showed clear evidence of progression with extensive worsening of intra-abdominal lymphadenopathy new lung metastasis.     CT scan abdomen/pelvis dose at [facility] : Large conglomerate adenopathy is identified diroughout the retroperitoneum with regions appearing fibrotic. For example, large adenopadiy at die retroperitoneum encasing the abdominal aorta and R7C in transverse plane is 13.2 X 6.1 cm, series 3 image 39. The crariiocaudal extent is approximately 15.6 cm on coronal series 4 image 33. This  also encases portions of the renal vessels. The adenopathy contacts and surrounds the mid ureters bilaterally. This adenopadiy also extends into the upper and bilateral pelvis. An example at the left pelvic sidewall posterior to die external iliac vessels is 2.2 x 1.5 cm. An example on die right side adjacent to the external iliac vessels is 3.5 x 2.5 cm. There are other examples. There are multiple enlarged lymph nodes also seen widiiri the mesentery that appears contiguous with die retroperitoneal adenopathy, for mstarice series 3 image 105.    Patient was supposed to go back to see Dr. Nguyen however he ended up in the ER due to worsening abdominal pain there were no beds in [facility] and he was transferred over to [facility].       Oncologic chronology    Mr. Andre Serrano is a 65-year-old pleasant man with a history of elevated PSA in 09/2019. He had multiple  urinary symptoms including hesitancy, frequency and decrease in urine flow for a while, but worsened  significantly over few days. He was supposed to have a followup September, but lost to followup and he  came to the outpatient office with increasing abdominal pain and left flank pain. He had constipation a few days,  but he was not eating much and was found to have creatinine of 9 and was admitted to the hospital on 03/20/2020.  A Pride catheter was placed and felt better. CT scan of abdomen without contrast was done and it showed moderate  bilateral hydroureteronephrosis with the hydroureter extending to the distal aspect without evidence of urolithiasis. There is moderate asymmetric left  periureteral and perinepliric stranding. Mild nodular enlargement of the prostate and dense in the base of the  urinary bladder with regular layering hyperdensity, inseparable from the nodular protrusion of the prostate. There  is lymphadenopathy, diffuse retroperitoneal and bilateral iliac chain, left periaortic lymph nodes at the level of the  left kidney 2.1 x 2.8 cm. Left external iliac lymph node 1.9 x 3.1 cm. Multiple lymph nodes adjacent to the  inferior mesenteric vasculature measuring 1.4 x 1.6 cm. Numerous bilateral internal iliac lymphadenopathy.  Multiple faint sclerotic lesions in the visualized spine and pelvis, most prominent in the subtrochanteric right  proximal femur, intertrochanteric left proximal femur consistent with metastatic disease.    Biopsy of the pelvic lymph node showed adenocarcinoma consistent with prostate cancer.  Bone scan was done on March 23 which showed widespread metastatic disease including humeri femurs sacrum and ribs in addition to majority of the spine.    He was given first LHRH analog Firmagon at urology office on March 31, 2020.  Abiraterone prednisone regimen was started in May 2020 after approval of the regimen.    PSA in July was down to 2.2 normal. Decreased further in February to 0.5 and 0.4 in 2021.    Past Medical History   I have reviewed this patient's medical history and updated it with pertinent information if needed.   Past Medical History:   Diagnosis Date     Cancer (H)      Heavy alcohol use      Hypercholesteremia      Lower urinary tract symptoms (LUTS)        Past Surgical History   I have reviewed this patient's surgical history and updated it with pertinent information if needed.  Past Surgical History:   Procedure Laterality Date     APPENDECTOMY  1972     HERNIORRHAPHY INGUINAL INFANT         Prior to Admission Medications   Prior to Admission Medications   Prescriptions Last Dose Informant Patient Reported? Taking?   Psyllium 58.12 % PACK 9/15/2021 at Unknown time Self Yes Yes   Sig: Take 1 packet by mouth 3 times daily   SENEXON-S 8.6-50 MG tablet 9/15/2021 at Unknown time  No Yes   Sig: TAKE TWO TABLETS BY MOUTH TWICE A DAY   abiraterone (ZYTIGA) 250 MG tablet 9/15/2021 at 10:30 am  Yes Yes   Sig: Take 1,000 mg by mouth daily   leuprolide (ELIGARD) 45 MG kit More than a month at 5 months ago  Yes Yes   Si mg every 6 months    ondansetron (ZOFRAN-ODT) 4 MG ODT tab Past Month at Unknown time  Yes Yes   Sig: Take 4 mg by mouth daily as needed    polyethylene glycol (MIRALAX) 17 g packet 9/15/2021 at am  Yes Yes   Sig: Take 1 packet by mouth daily   predniSONE (DELTASONE) 5 MG tablet 9/15/2021 at Unknown time  Yes Yes   Sig: Take 5 mg by mouth 2 times daily    traZODone (DESYREL) 50 MG tablet 9/15/2021 at PM  No Yes   Sig: TAKE 1-2 TABLETS AT BEDTIME      Facility-Administered Medications: None     Allergies   No Known Allergies    Social History   I have reviewed this patient's social history and updated it with pertinent information if needed. Andre Serrano  reports that he is a non-smoker but has been  exposed to tobacco smoke. He has never used smokeless tobacco. He reports current alcohol use of about 16.7 standard drinks of alcohol per week. He reports current drug use. Drug: Marijuana.    Family History   I have reviewed this patient's family history and updated it with pertinent information if needed.   Family History   Problem Relation Age of Onset     Prostate Cancer Father      Brain Cancer Father      Uterine Cancer Mother      Coronary Artery Disease Maternal Grandfather      Diabetes No family hx of        Review of Systems   Review of system positive for abdominal pain constipation swelling in lower extremities.  Decreased appetite.     Physical Exam   Temp: 98.3  F (36.8  C) Temp src: Oral BP: 128/64 Pulse: 79   Resp: 18 SpO2: 93 % O2 Device: None (Room air)    Vital Signs with Ranges  Temp:  [97.2  F (36.2  C)-99.1  F (37.3  C)] 98.3  F (36.8  C)  Pulse:  [] 79  Resp:  [10-25] 18  BP: ()/(63-99) 128/64  SpO2:  [93 %-100 %] 93 %  205 lbs 3.2 oz    Constitutional: Awake, alert, cooperative, no apparent distress. ECOG PS 2  Eyes: Conjunctiva and pupils examined and there is pallor  HEENT: Moist mucous membranes, normal dentition.    GI: Soft, there is abdominal wall edema, abdomen is distended, firm.   lymph/Hematologic: No anterior cervical or supraclavicular adenopathy.  Skin: No rashes, no cyanosis, no edema.  There is bilateral lower extremity edema  Neurologic: Cranial nerves 2-12 intact, normal strength and sensation.  Psychiatric: Alert, oriented to person, place and time, no obvious anxiety or depression.    Data   CT scans reviewed discussed with Dr. Nguyen.

## 2021-09-16 NOTE — ED NOTES
[facility]  ED Nurse Handoff Report    ED Chief complaint: Abdominal Pain      ED Diagnosis:   Final diagnoses:   None       Code Status: Full Code    Allergies: No Known Allergies    Patient Story: Hx of cancer, presents with generalized weakness  Focused Assessment:  Alert and oriented x4. 18G in L AC, 20G in R AC. Ambulates with 1 assist. In room air.     Labs Ordered and Resulted from Time of ED Arrival Up to the Time of Departure from the ED   BASIC METABOLIC PANEL - Abnormal; Notable for the following components:       Result Value    Sodium 124 (*)     Chloride 92 (*)     Creatinine 1.30 (*)     Calcium 11.8 (*)     GFR Estimate 57 (*)     All other components within normal limits   HEPATIC FUNCTION PANEL - Abnormal; Notable for the following components:    Albumin 2.4 (*)     Alkaline Phosphatase 164 (*)     AST 55 (*)     All other components within normal limits   CBC WITH PLATELETS AND DIFFERENTIAL - Abnormal; Notable for the following components:    WBC Count 14.5 (*)     RBC Count 3.42 (*)     Hemoglobin 10.1 (*)     Hematocrit 31.7 (*)     Platelet Count 466 (*)     All other components within normal limits   ROUTINE UA WITH MICROSCOPIC REFLEX TO CULTURE - Abnormal; Notable for the following components:    Blood Urine Trace (*)     Protein Albumin Urine 30  (*)     Nitrite Urine Positive (*)     Leukocyte Esterase Urine Large (*)     Bacteria Urine Many (*)     RBC Urine 5 (*)     WBC Urine 22 (*)     All other components within normal limits    Narrative:     Urine Culture ordered based on laboratory criteria   NT PROBNP INPATIENT - Abnormal; Notable for the following components:    N terminal Pro BNP Inpatient 902 (*)     All other components within normal limits   DIFFERENTIAL - Abnormal; Notable for the following components:    Absolute Neutrophils 11.3 (*)     Absolute Metamyelocytes 0.1 (*)     Absolute Myelocytes 0.7 (*)     All other components within normal limits   LIPASE -  Normal   TROPONIN I - Normal   LACTIC ACID WHOLE BLOOD - Normal   COVID-19 VIRUS (CORONAVIRUS) BY PCR - Normal    Narrative:     Testing was performed using the mario  SARS-CoV-2 & Influenza A/B Assay on the mario  Carol  System.  This test should be ordered for the detection of SARS-COV-2 in individuals who meet SARS-CoV-2 clinical and/or epidemiological criteria. Test performance is unknown in asymptomatic patients.  This test is for in vitro diagnostic use under the FDA EUA for laboratories certified under CLIA to perform moderate and/or high complexity testing. This test has not been FDA cleared or approved.  A negative test does not rule out the presence of PCR inhibitors in the specimen or target RNA in concentration below the limit of detection for the assay. The possibility of a false negative should be considered if the patient's recent exposure or clinical presentation suggests COVID-19.  [facility] are certified under the Clinical Laboratory Improvement Amendments of 1988 (CLIA-88) as qualified to perform moderate and/or high complexity laboratory testing.   EXTRA BLOOD CULTURE BOTTLE   EXTRA RED TOP TUBE   EXTRA BLUE TOP TUBE   PERIPHERAL IV CATHETER   PERIPHERAL IV CATHETER   STRICT INTAKE AND OUTPUT   URINE CULTURE   CBC WITH PLATELETS & DIFFERENTIAL    Narrative:     The following orders were created for panel order CBC with Platelets & Differential.  Procedure                               Abnormality         Status                     ---------                               -----------         ------                     CBC with platelets and d...[[id number]]  Abnormal            Final result               Manual Differential[[id number]]          Abnormal            Final result                 Please view results for these tests on the individual orders.   EXTRA TUBE    Narrative:     The following orders were created for panel order [place] Draw.  Procedure                                Abnormality         Status                     ---------                               -----------         ------                     Extra Blood Culture Bottle[[id number]]                       Final result               Extra Red Top Tube[[id number]]                               Final result                 Please view results for these tests on the individual orders.   EXTRA TUBE    Narrative:     The following orders were created for panel order [place] Draw.  Procedure                               Abnormality         Status                     ---------                               -----------         ------                     Extra Blue Top Tube[[id number]]                              Final result                 Please view results for these tests on the individual orders.     CT Chest Pulmonary Embolism w Contrast   Final Result   IMPRESSION:   1.  No pulmonary embolism.      2.  Bulky upper abdominal retroperitoneal and mesenteric lymphadenopathy. Multiple small bilateral pulmonary nodules compatible with metastases. Small left pleural effusion. Mediastinal and hilar lymphadenopathy.      3.  Multiple sclerotic metastatic lesions.      XR Chest 2 Views   Final Result   IMPRESSION: Small left pleural effusion is new. Otherwise negative   chest. No pulmonary edema.      SB MORLEY MD            SYSTEM ID:  [id number]            Treatments and/or interventions provided: NS, Enema, Antibiotics  Patient's response to treatments and/or interventions: Tolerated well    To be done/followed up on inpatient unit:  Continue with plan of care per admitting MD.      Does this patient have any cognitive concerns?: N/A    Activity level - Baseline/Home:  Independent  Activity Level - Current:   Stand with Assist    Patient's Preferred language: English   Needed?: No    Isolation: None  Infection: Not Applicable  Patient tested for COVID 19 prior to admission: YES  Bariatric?: No    Vital Signs:    Vitals:    09/15/21 1802 09/15/21 1815 09/15/21 1830 09/15/21 1845   BP:       Pulse:  87 89 86   Resp:       Temp:       TempSrc:       SpO2: 99%          Cardiac Rhythm:     Was the PSS-3 completed:   Yes  What interventions are required if any?               Family Comments: No family at bedside  OBS brochure/video discussed/provided to patient/family: N/A             For the majority of the shift this patient's behavior was Green.       ED NURSE PHONE NUMBER: *[phone]

## 2021-09-16 NOTE — PROGRESS NOTES
[facility]  Hospitalist Progress Note    Assessment & Plan   Andre Serrano is a 65 year old male who was sent from [facility] emergency room as a direct admission because they did not have any beds.     Patient has a past medical history significant for metastatic prostate cancer with bone metastasis (follows up [facility]), constipation, dyslipidemia, sleep apnea.     Patient has been dealing with progressively worsening generalized abdominal pain, abdominal distention and constipation.  His oral intake has been very poor due to the symptoms.  He also gives a history of shortness of breath but not orthopnea.  He has been trying to deal with his constipation for the last many weeks with the help of his primary care physician but with little success.  He has tried fairly high dose of Senokot without any relief.  Milk of magnesia usually works for him a little bit.     He saw his oncologist yesterday and explained to him his symptoms of abdominal pain, distention and constipation.  He was supposed to get an IV infusion at that time but it was not given.  His oncologist recommended getting a CT scan which was done today.  The CT scan was done at an outside imaging facility and we do not have access to either the report or the images.  Because patient was not feeling quite well he finally decided to go to the emergency room at [facility].     Lab work done in the ER showed hyponatremia of 124 and hypercalcemia.  Creatinine was at baseline.  CT PE study was done which was negative.  The ER physician managed to talk to the radiologist who mentioned massive retroperitoneal lymphadenopathy as well as in the mesentery causing a ureteral obstruction.  We do not have the full radiology report.  Due to these findings as well as metabolic abnormalities, medical team admission was recommended.  Because they did not have any beds patient was transferred to our facility.   He was also given 1 g of ceftriaxone due to pyuria.  Patient has a chronic Cooper catheter in place which was changed on Monday in urology office.     Metastatic prostate cancer to lymph nodes and bone: Follows with [facility]. PSA 1.4 9/14/21. Diagnosis March 2020. OSH CT scan showed progression with massive enlargement of intra-abdominal lymph nodes  -Oncology consulted - concern for neuroendocrine differentiation vs lymphoma  -IR consulted for lymph node biopsy  -Defer medical management of cancer to Onc - possible change in treatment to chemotherapy.     Chronic urinary retention s/p chronic cooper; bilateral hydronephrosis secondary to lymphadenopathy due to ureteral obstruction: Patient follows with Urology as an outpatient. Cooper catheter exchanged 9/13/21  -Urology consulted - appreciate assistance - recommended bilateral ureteral stent placement/nephrostomies with primary urologist (Dr. Kolb); if renal fxn worsening will consider on emergent basis.     Severe hypercalcemia: Calcium 11.8. Ionized calcium 6.1. No clear symptoms of hypercalcemia. Known metastatic cancer.   -Monitor calcium levels  -Zometa per Onc  -Continue IVF hydration     Hypophosphatemia: Phosphorus 1.6. Replacement protocol.     Mild hyponatremia: Peripheral edema but intravascularly dry. Sodium improved with IVF.   -Continue IVF given hypercalcemia. Recheck BMP in AM    ANITA: Cr 1.1. On admission Cr. 1.3 on admission. Improved with IVF. Monitor.     Chronic anemia: Hemoglobin 8.6. Monitor    Leucocytosis with pyuria: Patient empirically started on Ceftriaxone. Cooper exchanged 9/13/21. Ceftriaxone 9/15. Monitor urine cultures.     Constipation: Continue medications. Patient having BMs but feels like his abdomen is distended. This is likely secondary to lymphadenopathy     Insomnia: Trazodone q HS    Non-severe malnutrition: RD consulted; complicates cares    FEN:  ml/hr; monitor; regular  Activity: As tolerated  DVT Prophylaxis:  Enoxaparin (Lovenox) SQ  Family update: Patient to update family     Code Status: Full Code    Disposition: 1-2 days pending improvement in electrolytes and biopsy completed.     Text Page (7am - 6pm, M-F)    Interval History   Patient is doing ok today. Having BMs. Abdomen still feels distended. No chest pain/palpitations. No respiratory symptoms. Good urine output. No muscles spasms. Discussed with nursing.     -Data reviewed today: I reviewed all new labs and imaging results over the last 24 hours. I personally reviewed:     Physical Exam   Temp: 98.3  F (36.8  C) Temp src: Oral BP: 128/64 Pulse: 79   Resp: 18 SpO2: 93 % O2 Device: None (Room air)    Vitals:    09/16/21 0032   Weight: 93.1 kg (205 lb 3.2 oz)     Vital Signs with Ranges  Temp:  [97.2  F (36.2  C)-99.1  F (37.3  C)] 98.3  F (36.8  C)  Pulse:  [] 79  Resp:  [10-25] 18  BP: ()/(63-99) 128/64  SpO2:  [93 %-100 %] 93 %  I/O last 3 completed shifts:  In: -   Out: 1600 [Urine:1600]    Constitutional: Awake, alert, cooperative, no apparent distress. Uncomfortable. Non-toxic. Appears stated age.   HEENT: Atraumatic. Normocephalic. Conjunctiva non-injected. Sclera anicteric. MMM.   Respiratory: Moves air bilaterally. Clear to auscultation bilaterally, no crackles or wheezing  Cardiovascular: Regular rate and rhythm, normal S1 and S2, and no murmur noted  GI: Round, distended, firm. Non-tender. Normoactive BS+   Skin/Integumen: No rashes, no cyanosis, +2 LE edema    Medications     sodium chloride 125 mL/hr at 09/16/21 0825       bisacodyl  10 mg Rectal Daily     cefTRIAXone  1 g Intravenous Q24H     [Held by provider] enoxaparin ANTICOAGULANT  40 mg Subcutaneous Q24H     polyethylene glycol  17 g Oral Daily     potassium & sodium phosphates  2 packet Oral or Feeding Tube TID     predniSONE  5 mg Oral BID w/meals     senna-docusate  2 tablet Oral BID     sodium chloride (PF)  3 mL Intracatheter Q8H     traZODone  50 mg Oral At Bedtime     Data    Recent Labs   Lab 09/16/21  0644 09/16/21  0003 09/15/21  1428   WBC 13.7*  --  14.5*   HGB 8.6*  --  10.1*   MCV 93  --  93     --  466*   * 125* 124*   POTASSIUM 4.1 4.3 4.2   CHLORIDE 95 95 92*   CO2 23 27 25   BUN 16 18 18   CR 1.15 1.23 1.30*   ANIONGAP 8 3 7   JAXSON 10.5* 11.4* 11.8*   GLC 85 87 94   ALBUMIN  --  2.3* 2.4*   PROTTOTAL  --   --  7.2   BILITOTAL  --   --  0.5   ALKPHOS  --   --  164*   ALT  --   --  18   AST  --   --  55*   LIPASE  --   --  95   TROPONIN  --   --  <0.015     Recent Results (from the past 24 hour(s))   XR Chest 2 Views    Narrative    XR CHEST 2 VW  9/15/2021 2:57 PM       INDICATION: shortness of breath on exertion w/anasarca  COMPARISON: 3/20/2020       Impression    IMPRESSION: Small left pleural effusion is new. Otherwise negative  chest. No pulmonary edema.    SB MORLEY MD         SYSTEM ID:  [id number]   CT Chest Pulmonary Embolism w Contrast    Narrative    EXAM: CT CHEST PULMONARY EMBOLISM W CONTRAST  LOCATION: [facility]  DATE/TIME: 9/15/2021 4:48 PM    INDICATION: Pain and shortness of breath.  COMPARISON: None.  TECHNIQUE: CT chest pulmonary angiogram during arterial phase injection of IV contrast. Multiplanar reformats and MIP reconstructions were performed. Dose reduction techniques were used.   CONTRAST: 72 mL [id number].    FINDINGS:  ANGIOGRAM CHEST: Pulmonary arteries are normal caliber and negative for pulmonary emboli. Thoracic aorta is negative for dissection. No CT evidence of right heart strain.    LUNGS AND PLEURA: Small left pleural effusion. Multiple small bilateral pulmonary nodules compatible with metastases.    MEDIASTINUM/AXILLAE: Mild mediastinal and hilar lymphadenopathy.    CORONARY ARTERY CALCIFICATION: Mild.    UPPER ABDOMEN: Bulky upper retroperitoneal conglomerate lymphadenopathy which measures up to 12.5 x 6.3 cm. There is incompletely visualized left-sided hydronephrosis. The right kidney intrarenal  collecting system is not seen on the images. Central   mesenteric lymphadenopathy.    MUSCULOSKELETAL: Multiple sclerotic metastatic lesions in the visualized skeleton.      Impression    IMPRESSION:  1.  No pulmonary embolism.    2.  Bulky upper abdominal retroperitoneal and mesenteric lymphadenopathy. Multiple small bilateral pulmonary nodules compatible with metastases. Small left pleural effusion. Mediastinal and hilar lymphadenopathy.    3.  Multiple sclerotic metastatic lesions.

## 2021-09-17 NOTE — PROGRESS NOTES
[facility]  Hospitalist Progress Note    Assessment & Plan   Andre Serrano is a 65 year old male who was sent from [facility] emergency room as a direct admission because they did not have any beds.     Patient has a past medical history significant for metastatic prostate cancer with bone metastasis ([facility] [facility] [place] [facility]), constipation, dyslipidemia, sleep apnea.     Patient has been dealing with progressively worsening generalized abdominal pain, abdominal distention and constipation.  His oral intake has been very poor due to the symptoms.  He also gives a history of shortness of breath but not orthopnea.  He has been trying to deal with his constipation for the last many weeks with the help of his primary care physician but with little success.  He has tried fairly high dose of Senokot without any relief.  Milk of magnesia usually works for him a little bit.     He saw his oncologist yesterday and explained to him his symptoms of abdominal pain, distention and constipation.  He was supposed to get an IV infusion at that time but it was not given.  His oncologist recommended getting a CT scan which was done today.  The CT scan was done at an outside imaging facility and we do not have access to either the report or the images.  Because patient was not feeling quite well he finally decided to go to the emergency room at [facility].     Lab work done in the ER showed hyponatremia of 124 and hypercalcemia.  Creatinine was at baseline.  CT PE study was done which was negative.  The ER physician managed to talk to the radiologist who mentioned massive retroperitoneal lymphadenopathy as well as in the mesentery causing a ureteral obstruction.  We do not have the full radiology report.  Due to these findings as well as metabolic abnormalities, medical team admission was recommended.  Because they did not have any beds patient was transferred to our facility.   He was also given 1 g of ceftriaxone due to pyuria.  Patient has a chronic Cooper catheter in place which was changed on Monday in urology office.     Metastatic prostate cancer to lymph nodes and bone: Follows with [facility]. PSA 1.4 9/14/21. Diagnosis March 2020. OSH CT scan showed progression with massive enlargement of intra-abdominal lymph nodes  -Oncology consulted - concern for neuroendocrine differentiation vs lymphoma  -Palliative care consulted - provide additional ongoing support and symptom mangement  -IR consulted for lymph node biopsy 9/17/21  -Defer medical management of cancer to Onc - possible change in treatment to chemotherapy.     Chronic urinary retention s/p chronic cooper; bilateral hydronephrosis secondary to lymphadenopathy due to ureteral obstruction: Patient follows with Urology as an outpatient. Cooper catheter exchanged 9/13/21  -Urology consulted - appreciate assistance - recommended bilateral ureteral stent placement/nephrostomies with primary urologist (Dr. Kolb); if renal fxn worsening will consider on emergent basis.     Severe hypercalcemia: Calcium 11.8. Ionized calcium 6.1. No clear symptoms of hypercalcemia. Known metastatic cancer.   -Monitor calcium levels - improved with IVF; Lasix 40 mg once.   -Zometa per Onc    Hypophosphatemia: Phosphorus 1.6. Replacement protocol.     Mild hyponatremia: Peripheral edema but intravascularly dry. Sodium improved with IVF.   -Hold IVF. Increased SOB and slight decrease in sodium. Increased peripheral edema. Lasix 40 mg once. Monitor sodium and urine output.    ANITA: Cr 1.1. On admission Cr. 1.3 on admission. Improved with IVF. Monitor.     Chronic anemia: Hemoglobin 8.6. Monitor    Urinary tract infection, citrobacter freundii: Patient empirically started on Ceftriaxone. Cooper exchanged 9/13/21. Ceftriaxone 9/15-present.     Constipation due to extensive lymphadenopathy: Continue medications. Patient having BMs but feels like his abdomen is  distended. This is likely secondary to lymphadenopathy. Started having BMs.      Insomnia: Trazodone q HS    Non-severe malnutrition: RD consulted; complicates cares    FEN: Oral hydration; monitor; regular  Activity: As tolerated  DVT Prophylaxis: Enoxaparin (Lovenox) SQ  Family update: Patient to update family     Code Status: Full Code    Disposition: Tomorrow pending improvement in labs and oncology treatment plan.     Text Page (7am - 6pm, M-F)    Interval History   Patient continues to not feel well. Uncomfortable. Abdomen full and distended. Difficulty taking deeper breaths due to abdominal discomfort. Increased weight. Edematous. Poor appetite. No chest pain or palpitations. Afebrile. Discussed with nursing.     -Data reviewed today: I reviewed all new labs and imaging results over the last 24 hours. I personally reviewed:     Physical Exam   Temp: 98.1  F (36.7  C) Temp src: Oral BP: 127/72 Pulse: 100   Resp: 18 SpO2: 98 % O2 Device: Nasal cannula    Vitals:    09/16/21 0032   Weight: 93.1 kg (205 lb 3.2 oz)     Vital Signs with Ranges  Temp:  [98.1  F (36.7  C)-98.9  F (37.2  C)] 98.1  F (36.7  C)  Pulse:  [] 100  Resp:  [18-20] 18  BP: (127-150)/(67-91) 127/72  SpO2:  [92 %-100 %] 98 %  I/O last 3 completed shifts:  In: 3237 [P.O.:2230; I.V.:1007]  Out: 1850 [Urine:1850]    Constitutional: Awake, alert, cooperative, no apparent distress. Uncomfortable. Non-toxic. Appears stated age.   HEENT: Atraumatic. Normocephalic. Conjunctiva non-injected. Sclera anicteric. MMM.   Respiratory: Moves air bilaterally. Clear to auscultation bilaterally, no crackles or wheezing  Cardiovascular: Regular rate and rhythm, normal S1 and S2, and no murmur noted  GI: Round, distended, firm. Non-tender. Normoactive BS+ Pride catheter, chronic.   Skin/Integumen: No rashes, no cyanosis, +3-4 LE L>R edema    Medications       bisacodyl  10 mg Rectal Daily     cefTRIAXone  1 g Intravenous Q24H     enoxaparin ANTICOAGULANT  40  mg Subcutaneous Q24H     polyethylene glycol  17 g Oral Daily     predniSONE  5 mg Oral BID w/meals     senna-docusate  2 tablet Oral BID     sodium chloride (PF)  3 mL Intracatheter Q8H     sodium phosphate  15 mmol Intravenous Q2H     traZODone  50 mg Oral At Bedtime     Data   Recent Labs   Lab 09/17/21  0652 09/16/21  0644 09/16/21  0003 09/15/21  1428 09/15/21  1428   WBC 16.4* 13.7*  --   --  14.5*   HGB 9.1* 8.6*  --   --  10.1*   MCV 94 93  --   --  93    375  --   --  466*   INR 1.02  --   --   --   --    * 126* 125*   < > 124*   POTASSIUM 4.2 4.1 4.3   < > 4.2   CHLORIDE 96 95 95   < > 92*   CO2 22 23 27   < > 25   BUN 17 16 18   < > 18   CR 1.11 1.15 1.23   < > 1.30*   ANIONGAP 6 8 3   < > 7   JAXSON 10.4* 10.5* 11.4*   < > 11.8*   GLC 99 85 87   < > 94   ALBUMIN 2.1*  --  2.3*   < > 2.4*   PROTTOTAL 6.6*  --   --   --  7.2   BILITOTAL 0.2  --   --   --  0.5   ALKPHOS 164*  --   --   --  164*   ALT 17  --   --   --  18   AST 48*  --   --   --  55*   LIPASE  --   --   --   --  95   TROPONIN  --   --   --   --  <0.015    < > = values in this interval not displayed.     No results found for this or any previous visit (from the past 24 hour(s)).

## 2021-09-17 NOTE — PROGRESS NOTES
Care Management  Note      Additional Information:  Pt has [facility] Trixie FIELD for primary.  Will follow along for needs.    Maria D David RN BSN   Inpatient Care Coordination  [facility]  [phone]    Sybil David, RN

## 2021-09-17 NOTE — PROGRESS NOTES
Retroperitoneal lymphnode biopsy complete. Pt tolerated well. VSS. Pt received 1 mg versed, 50 mcg fentanyl. Site at left lower back., covered with bandage. Samples placed in RPMI and saline. Pt transferred back to floor via cart. Report called to RN.

## 2021-09-17 NOTE — CONSULTS
"    [facility]  Palliative Care Consultation   Text Page    Assessment & Plan   Andre Serrano is a 65 year old male who was admitted on 9/15/2021.   Consulted by Dr. Pineda to assist with symptom management, goals of care, and development of plan of care.    Recommendations:  1. Goals of Care- Full Code-restorative cares at this time, biopsy pending  Hospitalization goals discussed Discussed his overall goals. He stated he wants to do everything he needs to live. He is ok with continuing treatments as they are offered. \"I will do whatever they tell me\" He stated he is eager to get his biopsy results to know what to do next.  Decisional Capacity- Intact. Patient does not have an advance directive. Per  informed consent policy next of kin should be involved if patient becomes unable.  -JULIET is mother who lives in [place]- she 92, he did not have contact information today  POLST will need completed should his goals change    2. Pain, abdominal pain   Patient's opioid use in past 24 hours: 0 = 0mg Daily Morphine Equivalent  [place] Board of Pharmacy Data Base Reviewed:    YES; As expected, no concern for misuse/abuse of controlled medications based on this report.  -Added gabapentin 100 mg BID, if still having pain and tolerating gabapentin would increase to 200 mg BID tomorrow    3. Weakness  Appreciate the input of therapies for discharge recommendations, patient lives alone with limited support    4. Decreased appetite, nausea  -encourage small frequent meals  -Zofran 4 mg every 6 hours PRN    5. Spiritual Care  Oriented to Spiritual Health as part of Palliative Care team.  Spiritual Background: declined    6. Care Planning  Appreciate Care of Tara Joshi \[facility]\"" for discharge planning as able.    Medical Decision Making and Goals of Care:  Discussed on September 17, 2021 with Debbi AZAR, CNP: see discussion above for goals of care    Thank you for involving us in the " patient's care.     Debbi AZAR CNP  Pain Management and Palliative Care  [facility]  Pgr: [phone]    Time Spent on this Encounter   Total unit/floor time 66 minutes, time consisted of the following, examination of the patient, reviewing the record and completing documentation. >50% of time spent in counseling and coordination of care, Bedside Nurse Thu and Hospitalist Dr Perez.  Time spend counseling with patient consisted of the following topics, goals of care, education about diagnosis, care planning for discharge and symptom management.    Understanding of disease process:   This has been discussed with patient    History of Present Illness   History is obtained from the patient  Electronic medical record    Andre Serrano is a 65 year old male with a past medical history of prostate cancer with mets to the bone, chronic urinary retention- chronic cooper sleep apnea, anemia, dyslipidemia, who presents with constipation, abdominal pain and abdominal distention. Found to have a massive retroperitoneal lymphadenopathy as well as mesentery causing ureteral obstruction.       Past Medical History   I have reviewed this patient's medical history and updated it with pertinent information if needed.   Past Medical History:   Diagnosis Date     Cancer (H)      Heavy alcohol use      Hypercholesteremia      Lower urinary tract symptoms (LUTS)        Past Surgical History   I have reviewed this patient's surgical history and updated it with pertinent information if needed.  Past Surgical History:   Procedure Laterality Date     APPENDECTOMY  1972     HERNIORRHAPHY INGUINAL INFANT         Prior to Admission Medications   Prior to Admission Medications   Prescriptions Last Dose Informant Patient Reported? Taking?   Psyllium 58.12 % PACK 9/15/2021 at Unknown time Self Yes Yes   Sig: Take 1 packet by mouth 3 times daily   SENEXON-S 8.6-50 MG tablet 9/15/2021 at Unknown time  No Yes    Sig: TAKE TWO TABLETS BY MOUTH TWICE A DAY   abiraterone (ZYTIGA) 250 MG tablet 9/15/2021 at 10:30 am  Yes Yes   Sig: Take 1,000 mg by mouth daily   leuprolide (ELIGARD) 45 MG kit More than a month at 5 months ago  Yes Yes   Si mg every 6 months    ondansetron (ZOFRAN-ODT) 4 MG ODT tab Past Month at Unknown time  Yes Yes   Sig: Take 4 mg by mouth daily as needed    polyethylene glycol (MIRALAX) 17 g packet 9/15/2021 at am  Yes Yes   Sig: Take 1 packet by mouth daily   predniSONE (DELTASONE) 5 MG tablet 9/15/2021 at Unknown time  Yes Yes   Sig: Take 5 mg by mouth 2 times daily    traZODone (DESYREL) 50 MG tablet 9/15/2021 at PM  No Yes   Sig: TAKE 1-2 TABLETS AT BEDTIME      Facility-Administered Medications: None     Allergies   No Known Allergies    Social History   I have updated and reviewed the following Social History Narrative:   Social History     Social History Narrative     Not on file       Family History   I have reviewed this patient's family history and updated it with pertinent information if needed.   Family History   Problem Relation Age of Onset     Prostate Cancer Father      Brain Cancer Father      Uterine Cancer Mother      Coronary Artery Disease Maternal Grandfather      Diabetes No family hx of        Review of Systems   The 10 point Review of Systems is negative other than noted in the HPI or here.   Abdominal pain, constipation    Physical Exam   Temp:  [98.1  F (36.7  C)-98.9  F (37.2  C)] 98.1  F (36.7  C)  Pulse:  [] 104  Resp:  [18-20] 18  BP: (131-150)/(70-91) 142/71  SpO2:  [92 %-100 %] 100 %  215 lbs 11.2 oz  Exam:  GENERAL APPEARANCE:  Alert, in no distress, cooperative  ENT:  Mouth and posterior oropharynx normal, moist mucous membranes  EYES:  EOM, conjunctivae, lids, pupils and irises normal  RESP:  respiratory effort and palpation of chest normal, no respiratory distress  CV:  Palpation and auscultation of heart done , regular rate and rhythm, no murmur, rub, or  gallop  ABDOMEN:  distended  PSYCH:  oriented X 3      Data  I have personally reviewed all labs prior to medication changes  Results for orders placed or performed during the hospital encounter of 09/15/21 (from the past 24 hour(s))   Phosphorus   Result Value Ref Range    Phosphorus 1.4 (L) 2.5 - 4.5 mg/dL   INR   Result Value Ref Range    INR 1.02 0.85 - 1.15   CBC with platelets   Result Value Ref Range    WBC Count 16.4 (H) 4.0 - 11.0 10e3/uL    RBC Count 2.95 (L) 4.40 - 5.90 10e6/uL    Hemoglobin 9.1 (L) 13.3 - 17.7 g/dL    Hematocrit 27.8 (L) 40.0 - 53.0 %    MCV 94 78 - 100 fL    MCH 30.8 26.5 - 33.0 pg    MCHC 32.7 31.5 - 36.5 g/dL    RDW 13.3 10.0 - 15.0 %    Platelet Count 353 150 - 450 10e3/uL   Comprehensive metabolic panel   Result Value Ref Range    Sodium 124 (L) 133 - 144 mmol/L    Potassium 4.2 3.4 - 5.3 mmol/L    Chloride 96 94 - 109 mmol/L    Carbon Dioxide (CO2) 22 20 - 32 mmol/L    Anion Gap 6 3 - 14 mmol/L    Urea Nitrogen 17 7 - 30 mg/dL    Creatinine 1.11 0.66 - 1.25 mg/dL    Calcium 10.4 (H) 8.5 - 10.1 mg/dL    Glucose 99 70 - 99 mg/dL    Alkaline Phosphatase 164 (H) 40 - 150 U/L    AST 48 (H) 0 - 45 U/L    ALT 17 0 - 70 U/L    Protein Total 6.6 (L) 6.8 - 8.8 g/dL    Albumin 2.1 (L) 3.4 - 5.0 g/dL    Bilirubin Total 0.2 0.2 - 1.3 mg/dL    GFR Estimate 69 >60 mL/min/1.73m2   Magnesium   Result Value Ref Range    Magnesium 2.1 1.6 - 2.3 mg/dL   Ionized Calcium   Result Value Ref Range    Calcium Ionized 5.5 (H) 4.4 - 5.2 mg/dL   Lactate Dehydrogenase   Result Value Ref Range    Lactate Dehydrogenase 328 (H) 85 - 227 U/L

## 2021-09-17 NOTE — PLAN OF CARE
End of Shift Summary  For vital signs and complete assessments, please see documentation flowsheets.     Pertinent assessments: A/O. VSS on RA. Patient complains of abdominal pain, given tylenol. Abdomen remain distended, pt reports BM overnight. Chronic cooper patent, DEJESUS. Up SBA. Awaiting phosphorus recheck. Initiated NPO at 0600.    Major Shift Events: uneventful    Treatment Plan: IV fluids, Rocephin, pain management, HEMOC consult, urology consult. Neuros q4hrs. Biopsy scheduled for 9/17, hold Lovenox for 24 hrs and NPO 4 hrs prior to procedure.

## 2021-09-17 NOTE — PROGRESS NOTES
[facility]    Hematology / Oncology f/u     Date of Admission:  9/15/2021    Assessment & Plan   Andre Serrano is a 65 year old male who was admitted on 9/15/2021. I was asked to see the patient for history of metastatic prostate cancer.     Assessment:  Plan:  1.  Metastatic prostate cancer with second metastasis to lymph nodes and bones:  -Initial diagnosis in March 2020: Baseline PSA > 700:   -Current treatment is ADT plus abiraterone  -PSA from 9/14/2021 was 1.4  -However CT scan findings show clear progression with massive enlargement of intra-abdominal lymphadenopathy.     Plan   -Discussed with Dr. Nguyen, the discordant findings between PSA and CT scan is worrisome for either neuroendocrine differentiation.  Differential diagnosis include possibility of a different histology such as lymphoma.  -Patient scheduled for a retroperitoneal lymph node biopsy today a.m.  -Likely would require change in treatment to chemotherapy.  -The aspects were discussed with patient he is agreeable to biopsy.     2.  Bilateral hydronephrosis  -Patient was seen by urology yesterday  -He will likely need stent placements, however this was deferred to outpatient.  -May still need stent placement and hospital if creatinine gets worse.     3.  Hypercalcemia  - IV hydration  -Had 1 dose of Zometa on 9/16/2021  -Calcium is 10.4 today.   Plan   -I rehydration continue to monitor    4.  Anemia  -Likely secondary to underlying metastatic prostate cancer  Plan   -We will transfuse with hemoglobin less than 7.0     5.  Constipation  -Likely secondary to hypercalcemia    Plan   - Aggressive bowel regimen  -Please give milk of magnesia, Senokot daily till has good bowel movements  -Enema as needed.       Christo Jasso MD   [facility]  Office:  [phone]    Code Status    Full Code    Reason for Consult   Reason for consult: History of metastatic prostate cancer    Primary Care Physician   Chacho Galan    Chief  Complaint     Abdominal pain.       History of Present Illness    Andre Serrano is a 65 year old male who presents with abdominal pain.  Patient has past medical history significant for metastatic prostate cancer, he follows with Dr. Dariel Nguyen.  He had previous history of elevated PSA.  Diagnosis was confirmed in March 2020 when he presented with rising PSA as well as metastatic intra-abdominal lymphadenopathy, and bone metastasis.   Lymph node biopsy from 2/23/2020 confirmed metastatic adenocarcinoma.  He was initially started on ADT, and abiraterone was added in May 2020 which is his current treatment.  He has had a good PSA response going down from an initial value of 713 in March 2020 to less than 0.5 in March 2021.   Patient reported that approximately 3 months ago he started experiencing new symptoms of abdominal pain.  He describes this as distention.  He had difficulty with bowels experiencing more constipation.  Symptoms were progressive.  He met with Dr. Nguyen recently, laboratory studies show a PSA of 1.4 however his calcium was elevated and because of his abdominal symptoms a CT scan of abdomen pelvis was requested which patient completed on 9/14/2021.  The CT scan showed clear evidence of progression with extensive worsening of intra-abdominal lymphadenopathy new lung metastasis.     CT scan abdomen/pelvis dose at [place] [facility] : Large conglomerate adenopathy is identified diroughout the retroperitoneum with regions appearing fibrotic. For example, large adenopadiy at die retroperitoneum encasing the abdominal aorta and R7C in transverse plane is 13.2 X 6.1 cm, series 3 image 39. The crariiocaudal extent is approximately 15.6 cm on coronal series 4 image 33. This  also encases portions of the renal vessels. The adenopathy contacts and surrounds the mid ureters bilaterally. This adenopadiy also extends into the upper and bilateral pelvis. An example at the left pelvic sidewall  posterior to die external iliac vessels is 2.2 x 1.5 cm. An example on die right side adjacent to the external iliac vessels is 3.5 x 2.5 cm. There are other examples. There are multiple enlarged lymph nodes also seen widiiri the mesentery that appears contiguous with die retroperitoneal adenopathy, [facility] 3 image 105.    Patient was supposed to go back to see Dr. Nguyen however he ended up in the ER due to worsening abdominal pain there were no beds in [facility] and he was transferred over to [facility].       Oncologic chronology    Mr. Andre Serrano is a 65-year-old pleasant man with a history of elevated PSA in 09/2019. He had multiple  urinary symptoms including hesitancy, frequency and decrease in urine flow for a while, but worsened  significantly over few days. He was supposed to have a followup September, but lost to followup and he  came to the outpatient office with increasing abdominal pain and left flank pain. He had constipation a few days,  but he was not eating much and was found to have creatinine of 9 and was admitted to the hospital on 03/20/2020.  A Pride catheter was placed and felt better. CT scan of abdomen without contrast was done and it showed moderate  bilateral hydroureteronephrosis with the hydroureter extending to the distal aspect without evidence of urolithiasis. There is moderate asymmetric left  periureteral and perinepliric stranding. Mild nodular enlargement of the prostate and dense in the base of the  urinary bladder with regular layering hyperdensity, inseparable from the nodular protrusion of the prostate. There  is lymphadenopathy, diffuse retroperitoneal and bilateral iliac chain, left periaortic lymph nodes at the level of the  left kidney 2.1 x 2.8 cm. Left external iliac lymph node 1.9 x 3.1 cm. Multiple lymph nodes adjacent to the  inferior mesenteric vasculature measuring 1.4 x 1.6 cm. Numerous bilateral internal iliac  lymphadenopathy.  Multiple faint sclerotic lesions in the visualized spine and pelvis, most prominent in the subtrochanteric right  proximal femur, intertrochanteric left proximal femur consistent with metastatic disease.    Biopsy of the pelvic lymph node showed adenocarcinoma consistent with prostate cancer.  Bone scan was done on March 23 which showed widespread metastatic disease including humeri femurs sacrum and ribs in addition to majority of the spine.    He was given first LHRH analog Firmagon at urology office on March 31, 2020. Abiraterone prednisone regimen was started in May 2020 after approval of the regimen.    PSA in July was down to 2.2 normal. Decreased further in February to 0.5 and 0.4 in April 2021.    Interval history  9/17: Patient continues to remain very uncomfortable with abdominal bloating, has not had a good bowel movement for many weeks.  Becomes nauseous when she tries to eat.     Past Medical History   I have reviewed this patient's medical history and updated it with pertinent information if needed.   Past Medical History:   Diagnosis Date     Cancer (H)      Heavy alcohol use      Hypercholesteremia      Lower urinary tract symptoms (LUTS)        Past Surgical History   I have reviewed this patient's surgical history and updated it with pertinent information if needed.  Past Surgical History:   Procedure Laterality Date     APPENDECTOMY  1972     HERNIORRHAPHY INGUINAL INFANT         Prior to Admission Medications   Prior to Admission Medications   Prescriptions Last Dose Informant Patient Reported? Taking?   Psyllium 58.12 % PACK 9/15/2021 at Unknown time Self Yes Yes   Sig: Take 1 packet by mouth 3 times daily   SENEXON-S 8.6-50 MG tablet 9/15/2021 at Unknown time  No Yes   Sig: TAKE TWO TABLETS BY MOUTH TWICE A DAY   abiraterone (ZYTIGA) 250 MG tablet 9/15/2021 at 10:30 am  Yes Yes   Sig: Take 1,000 mg by mouth daily   leuprolide (ELIGARD) 45 MG kit More than a month at 5 months  ago  Yes Yes   Si mg every 6 months    ondansetron (ZOFRAN-ODT) 4 MG ODT tab Past Month at Unknown time  Yes Yes   Sig: Take 4 mg by mouth daily as needed    polyethylene glycol (MIRALAX) 17 g packet 9/15/2021 at am  Yes Yes   Sig: Take 1 packet by mouth daily   predniSONE (DELTASONE) 5 MG tablet 9/15/2021 at Unknown time  Yes Yes   Sig: Take 5 mg by mouth 2 times daily    traZODone (DESYREL) 50 MG tablet 9/15/2021 at PM  No Yes   Sig: TAKE 1-2 TABLETS AT BEDTIME      Facility-Administered Medications: None     Allergies   No Known Allergies    Social History   I have reviewed this patient's social history and updated it with pertinent information if needed. Andre SANCHEZ Nayjessica  reports that he is a non-smoker but has been exposed to tobacco smoke. He has never used smokeless tobacco. He reports current alcohol use of about 16.7 standard drinks of alcohol per week. He reports current drug use. Drug: Marijuana.    Family History   I have reviewed this patient's family history and updated it with pertinent information if needed.   Family History   Problem Relation Age of Onset     Prostate Cancer Father      Brain Cancer Father      Uterine Cancer Mother      Coronary Artery Disease Maternal Grandfather      Diabetes No family hx of        Review of Systems   Review of system positive for abdominal pain constipation swelling in lower extremities.  Decreased appetite.     Physical Exam   Temp: 98.2  F (36.8  C) Temp src: Oral BP: 139/70 Pulse: 84   Resp: 18 SpO2: 94 % O2 Device: None (Room air)    Vital Signs with Ranges  Temp:  [98.1  F (36.7  C)-98.9  F (37.2  C)] 98.2  F (36.8  C)  Pulse:  [] 84  Resp:  [18-20] 18  BP: (103-150)/(65-91) 139/70  SpO2:  [92 %-98 %] 94 %  205 lbs 3.2 oz    Constitutional: Awake, alert, cooperative, no apparent distress. ECOG PS 2  GI: Soft, there is abdominal wall edema, abdomen is distended, firm.  There is mild diffuse tenderness.   lThere is bilateral lower extremity  edema    Data   Treatment plan reviewed with patient

## 2021-09-17 NOTE — PLAN OF CARE
Sodium 126, trending up. Phos replaced, recheck ordered for 0500. BM today and pt passing flatus. Abdomen distended. Pain in abdomen and back, given Tylenol and heating pad for comfort. Poor appetite for dinner. Intermittent nausea, declines antiemetic. Pt sitting up in chair and ambulates in room with standby assist.

## 2021-09-18 NOTE — PLAN OF CARE
VSS. Pt c/o of abd pain and received 5mg Oxy x2 and stated that it helped a lot, along with transitioning from the bed to the chair. Chronic Pride output: 2650cc and a 1lb decrease in am wt. Pt makes needs known, will cont plan of care.

## 2021-09-18 NOTE — PLAN OF CARE
To Do:  End of Shift Summary  For vital signs and complete assessments, please see documentation flowsheets.     Pertinent assessments: Patient is AOx4 this shift. Complains of generalized discomfort, MD aware. Pain consult. Active bowel sounds. Abdomen firm and distended. +2 edema to BLE. Chronic cooper catheter, good output. Complains of SOB, expiratory wheeze noted.     Major Shift Events: Patient went down for biopsy today, tolerated well. Biopsy site to back. Phos replacement done. One time dose of IV Lasix given related to edema and wheezy lung sounds. Pain/Palliative team on board. Phos replacement done this shift, recheck at 0200.     Treatment Plan: Wait for biospy results to determine tx plan. IV Lasix, monitor labs, supportive cares.     Bedside Nurse: Thu Crawford RN

## 2021-09-18 NOTE — PROGRESS NOTES
Oncology/Hematology Follow Up Note:    Assessment and Plan:    Andre Serrano is a 65 year old male who was admitted on 9/15/2021. I was asked to see the patient for history of metastatic prostate cancer.      Assessment:  Plan:  1.  Metastatic prostate cancer with second metastasis to lymph nodes and bones:  -Initial diagnosis in March 2020: Baseline PSA > 700:   -Current treatment is ADT plus abiraterone  -PSA from 9/14/2021 was 1.4  -However CT scan findings show clear progression with massive enlargement of intra-abdominal lymphadenopathy. Images reviewed and agree.     Plan   -Discussed with Dr. Nguyen, the discordant findings between PSA and CT scan is worrisome for either neuroendocrine differentiation.  Differential diagnosis include possibility of a different histology such as lymphoma.  -Patient had a retroperitoneal lymph node biopsy done yesterday. Results pending.  -Likely would require change in treatment to chemotherapy. Discussed with Negrito, need biopsy results first. No update on path this weekend and maybe till early to mid week to get results.  - can arrange OP Follow-up to d/w path if discharged tomorrow  .      2.  Bilateral hydronephrosis  -Patient was seen by urology yesterday  -He will likely need stent placements, however this was deferred to outpatient.  -May still need stent placement and hospital if creatinine gets worse.      3.  Hypercalcemia  - IV hydration  -Had 1 dose of Zometa on 9/16/2021  -Calcium is 9.9 today.   Plan   -I rehydration continue to monitor     4.  Anemia  -Likely secondary to underlying metastatic prostate cancer  Plan   -We will transfuse with hemoglobin less than 7.0      5.  Constipation  -Likely secondary to hypercalcemia     Plan   - Aggressive bowel regimen  -Please give milk of magnesia, Senokot daily till has good bowel movements  -Enema as needed.   - d/w RN and will try the above, hasn't had MOM today    6. LE edema and bloating- likely from LN bulky,  no calf pain     Subjective:     Feeling the same, bloated, no full BM, LE swelling, weight gain      Labs:  All labs reviewed    CBCRecent Labs   Lab 09/18/21 0758 09/17/21 0652 09/17/21 0652 09/16/21 0644 09/16/21 0644   WBC 14.9*  --  16.4*  --  13.7*   HGB 8.8*  --  9.1*  --  8.6*   MCV 94   < > 94   < > 93     --  353  --  375    < > = values in this interval not displayed.       CMP  Recent Labs   Lab 09/18/21 0758 09/18/21  0438 09/17/21 0652 09/16/21  0644 09/16/21  0003 09/15/21  1428 09/15/21  1428   *  --  124* 126* 125*   < > 124*   POTASSIUM 4.1  --  4.2 4.1 4.3   < > 4.2   CHLORIDE 95  --  96 95 95   < > 92*   CO2 23  --  22 23 27   < > 25   ANIONGAP 8  --  6 8 3   < > 7   GLC 94  --  99 85 87   < > 94   BUN 18  --  17 16 18   < > 18   CR 1.24  --  1.11 1.15 1.23   < > 1.30*   GFRESTIMATED 60*  --  69 66 61   < > 57*   JAXSON 9.9  --  10.4* 10.5* 11.4*   < > 11.8*   MAG  --   --  2.1 2.1  --   --   --    PHOS  --  2.2* 1.4* 1.6* 1.7*  --   --    PROTTOTAL 6.4*  --  6.6*  --   --   --  7.2   ALBUMIN 2.1*  --  2.1*  --  2.3*  --  2.4*   BILITOTAL 0.6  --  0.2  --   --   --  0.5   ALKPHOS 147  --  164*  --   --   --  164*   AST 48*  --  48*  --   --   --  55*   ALT 14  --  17  --   --   --  18    < > = values in this interval not displayed.       INR  Recent Labs   Lab 09/17/21 0652   INR 1.02       Blood CultureNo results for input(s): CULT in the last 168 hours.  LE mild edema, bloating of abdomen    Beatris Dominguez MD  [facility]  9/18/2021 9:51 AM

## 2021-09-18 NOTE — PROGRESS NOTES
[facility]  Hospitalist Progress Note    Assessment & Plan   Andre Serrano is a 65 year old male who was sent from [facility] emergency room as a direct admission because they did not have any beds.     Patient has a past medical history significant for metastatic prostate cancer with bone metastasis (follows up with [facility]), constipation, dyslipidemia, sleep apnea.     Patient has been dealing with progressively worsening generalized abdominal pain, abdominal distention and constipation.  His oral intake has been very poor due to the symptoms.  He also gives a history of shortness of breath but not orthopnea.  He has been trying to deal with his constipation for the last many weeks with the help of his primary care physician but with little success.  He has tried fairly high dose of Senokot without any relief.  Milk of magnesia usually works for him a little bit.     He saw his oncologist yesterday and explained to him his symptoms of abdominal pain, distention and constipation.  He was supposed to get an IV infusion at that time but it was not given.  His oncologist recommended getting a CT scan which was done today.  The CT scan was done at an outside imaging facility and we do not have access to either the report or the images.  Because patient was not feeling quite well he finally decided to go to the emergency room at [facility].     Lab work done in the ER showed hyponatremia of 124 and hypercalcemia.  Creatinine was at baseline.  CT PE study was done which was negative.  The ER physician managed to talk to the radiologist who mentioned massive retroperitoneal lymphadenopathy as well as in the mesentery causing a ureteral obstruction.  We do not have the full radiology report.  Due to these findings as well as metabolic abnormalities, medical team admission was recommended.  Because they did not have any beds patient was transferred to our facility.   He was also given 1 g of ceftriaxone due to pyuria.  Patient has a chronic Cooper catheter in place which was changed on Monday in urology office.     Metastatic prostate cancer to lymph nodes and bone: Follows with [facility]. PSA 1.4 9/14/21. Diagnosis March 2020. OSH CT scan showed progression with massive enlargement of intra-abdominal lymph nodes  -Oncology consulted - concern for neuroendocrine differentiation vs lymphoma  -Palliative care consulted - provide additional ongoing support and symptom mangement  -IR consulted for lymph node biopsy 9/17/21 - pathology pending   -Defer medical management of cancer to Onc - possible change in treatment to chemotherapy.     Complicated urinary tract infection with chronic indwelling cooper catheter, citrobacter freundii: Patient empirically started on Ceftriaxone. Cooper exchanged 9/13/21. Ceftriaxone 9/15-9/18. Started on oral ciprofloxacin 500 mg BID to complete 10 day course of abx for complicated UTI    Chronic urinary retention s/p chronic cooper; bilateral hydronephrosis secondary to lymphadenopathy due to ureteral obstruction: Patient follows with Urology as an outpatient. Cooper catheter exchanged 9/13/21  -Urology consulted - appreciate assistance - recommended bilateral ureteral stent placement/nephrostomies with primary urologist (Dr. Kolb); if renal fxn worsening will consider on emergent basis. Monitor BMP    Severe hypercalcemia: Calcium 11.8. Ionized calcium 6.1. No clear symptoms of hypercalcemia. Known metastatic cancer. Calcium levels improve with IVF, lasix x1, and Zometa once.   -Heme/onc following - appreciate assistance  -Improved calcium levels - monitor    Hypophosphatemia: Phosphorus 1.6. Replacement protocol.     Mild hyponatremia: Peripheral edema but intravascularly dry. Sodium improved with IVF. IVF stopped 9/17/21. Lasix 40 mg IV once with good urine output but given slight increase in Cr.   -Monitor sodium and urine output.    ANITA: Cr 1.1.  On admission Cr. 1.3 on admission. Improved with IVF. Monitor.     Chronic anemia: Hemoglobin 8.6. Monitor    Constipation due to extensive lymphadenopathy: Continue medications. Patient having BMs but feels like his abdomen is distended. This is likely secondary to lymphadenopathy. Started having BMs.      Insomnia: Trazodone q HS    Non-severe malnutrition: RD consulted; complicates cares    FEN: Oral hydration; monitor; regular  Activity: As tolerated  DVT Prophylaxis: Enoxaparin (Lovenox) SQ  Family update: Patient to update family     Code Status: Full Code    Disposition: Tomorrow pending improvement in abdominal discomfort and oncology treatment plan.     Text Page (7am - 6pm, M-F)    Interval History   Patient continues to not feel well. Uncomfortable. Abdomen full and distended. Difficulty taking deeper breaths due to abdominal discomfort. Increased weight. Edematous. Poor appetite. No chest pain or palpitations. Afebrile. Discussed with nursing.     -Data reviewed today: I reviewed all new labs and imaging results over the last 24 hours. I personally reviewed:     Physical Exam   Temp: 99.2  F (37.3  C) Temp src: Oral BP: 125/74 Pulse: 83   Resp: 16 SpO2: 97 % O2 Device: None (Room air) Oxygen Delivery: 2 LPM  Vitals:    09/16/21 0032 09/17/21 1117 09/18/21 0715   Weight: 93.1 kg (205 lb 3.2 oz) 97.8 kg (215 lb 11.2 oz) 97.2 kg (214 lb 4.8 oz)     Vital Signs with Ranges  Temp:  [98.1  F (36.7  C)-99.4  F (37.4  C)] 99.2  F (37.3  C)  Pulse:  [] 83  Resp:  [16-18] 16  BP: (120-148)/(66-89) 125/74  SpO2:  [92 %-100 %] 97 %  I/O last 3 completed shifts:  In: -   Out: 2855 [Urine:2855]    Constitutional: Awake, alert, cooperative, no apparent distress. Uncomfortable. Non-toxic. Appears stated age.   HEENT: Atraumatic. Normocephalic. Conjunctiva non-injected. Sclera anicteric. MMM.   Respiratory: Moves air bilaterally. Clear to auscultation bilaterally, no crackles or wheezing  Cardiovascular: Regular  rate and rhythm, normal S1 and S2, and no murmur noted  GI: Round, distended, firm. Non-tender. Normoactive BS+ Pride catheter, chronic.   Skin/Integumen: No rashes, no cyanosis, +3-4 LE edema    Medications       ciprofloxacin  500 mg Oral Q12H GEN     enoxaparin ANTICOAGULANT  40 mg Subcutaneous Q24H     gabapentin  100 mg Oral BID     polyethylene glycol  17 g Oral Daily     potassium & sodium phosphates  1 packet Oral TID     predniSONE  5 mg Oral BID w/meals     sodium chloride (PF)  3 mL Intracatheter Q8H     traZODone  50 mg Oral At Bedtime     Data   Recent Labs   Lab 09/18/21  0758 09/17/21  0652 09/16/21  0644 09/16/21  0003 09/15/21  1428 09/15/21  1428   WBC 14.9* 16.4* 13.7*  --   --  14.5*   HGB 8.8* 9.1* 8.6*  --   --  10.1*   MCV 94 94 93  --   --  93    353 375  --   --  466*   INR  --  1.02  --   --   --   --    * 124* 126*   < >  --  124*   POTASSIUM 4.1 4.2 4.1   < >  --  4.2   CHLORIDE 95 96 95   < >  --  92*   CO2 23 22 23   < >  --  25   BUN 18 17 16   < >  --  18   CR 1.24 1.11 1.15   < >  --  1.30*   ANIONGAP 8 6 8   < >  --  7   JAXSON 9.9 10.4* 10.5*   < >  --  11.8*   GLC 94 99 85   < >  --  94   ALBUMIN 2.1* 2.1*  --    < >   < > 2.4*   PROTTOTAL 6.4* 6.6*  --   --    < > 7.2   BILITOTAL 0.6 0.2  --   --    < > 0.5   ALKPHOS 147 164*  --   --    < > 164*   ALT 14 17  --   --    < > 18   AST 48* 48*  --   --    < > 55*   LIPASE  --   --   --   --   --  95   TROPONIN  --   --   --   --   --  <0.015    < > = values in this interval not displayed.     Recent Results (from the past 24 hour(s))   CT Abdomen Retroperitoneal Biopsy    Narrative    CT ABDOMEN RETROPERITONEAL BIOPSY 9/17/2021 2:39 PM    HISTORY: 65-year-old patient with extensive retroperitoneal  lymphadenopathy. Patient has known prostate cancer, request made for  biopsy of the lymphadenopathy.    TECHNIQUE: Patient was brought to the CT department and informed  consent obtained. Patient was placed in a right lateral  decubitus  position. Skin overlying the patient's left back was prepped and  draped in standard sterile fashion. 1% lidocaine used for local  anesthesia. With CT guidance, a 17-gauge guide needle was advanced to  the retroperitoneal para-aortic soft tissue mass. Through the guide  needle, approximately 10 core biopsy samples were obtained with an  18-gauge needle. Patient tolerated the procedure well. Completion  images demonstrate no complication. Radiation dose for this scan was  reduced using automated exposure control, adjustment of the mA and/or  kV according to patient size, or iterative reconstruction technique.    Sedation: A moderate level of sedation was achieved with 1 mg IV  Versed and 50 mcg IV fentanyl.  Sedation time: 20 minutes.  Please note the above medications were administered by the  interventional radiology staff under my direct supervision.  Local anesthetic: 15 mL of 1% lidocaine.  Contrast: None    FINDINGS: A total of 7 noncontrast CT sequences obtained during  advancement of needle into the soft tissue mass. No obvious  complication. Patient tolerated the procedure well.      Impression    IMPRESSION: Successful CT-guided biopsy of retroperitoneal soft  tissue. Sample sent to the lab for evaluation.

## 2021-09-19 NOTE — CONSULTS
[facility] [place] [facility] Progress Note          Assessment and Plan:     Active Problems:  Andre Serrano is a(n) 65 year old male metastatic prostate cancer with progression on ADT plus Zytiga.  Medical oncology is following and has planned for possible lymph node biopsy from the metastatic site consideration for possible neuroendocrine differentiation in view of the lower PSA values in contrast to the large burden of metastasis.  He also has bilateral ureteral obstruction due to the same metastatic retroperitoneal lymphadenopathy. Patient is being followed with Dr. Kolb at [facility].      Rising serum creatinine  -Serum creatinine has risen slightly over the past 2 days up to 1.36 today from a baseline of about 1.15   - Continue to trend Cr  - NPO after midnight for possible ureteral stent placement vs nephrostomy tube placement tomorrow    Sravan Potts MD   [facility]  Office: [phone]               Interval History:     Off the floor for x-ray              Review of Systems:     The 5 point Review of Systems is negative other than noted in the HPI             Medications:     Current Facility-Administered Medications Ordered in Epic   Medication Dose Route Frequency Last Rate Last Admin     acetaminophen (TYLENOL) tablet 650 mg  650 mg Oral Q6H PRN   650 mg at 09/17/21 0906    Or     acetaminophen (TYLENOL) Suppository 650 mg  650 mg Rectal Q6H PRN         albuterol (PROAIR HFA/PROVENTIL HFA/VENTOLIN HFA) 108 (90 Base) MCG/ACT inhaler 2 puff  2 puff Inhalation 4x Daily PRN         bisacodyl (DULCOLAX) Suppository 10 mg  10 mg Rectal Daily PRN         ciprofloxacin (CIPRO) tablet 500 mg  500 mg Oral Q12H GEN   500 mg at 09/19/21 0758     enoxaparin ANTICOAGULANT (LOVENOX) injection 40 mg  40 mg Subcutaneous Q24H   40 mg at 09/19/21 0759     gabapentin (NEURONTIN) capsule 100 mg  100 mg Oral BID   100 mg at 09/19/21 0758     HOLD: enoxaparin (LOVENOX) pre-procedure OUTPATIENT -  High Bleeding Risk [For liver biopsy, lung biopsy, bone biopsy, pancreas biopsy, kidney biopsy and breast biopsy (Vacuum assisted)] Deep non organ biopsies (spine, soft tissue, in intraabdominal, retroperitoneal, pelvic compartments)   Does not apply HOLD         lidocaine (LMX4) cream   Topical Q1H PRN         lidocaine 1 % 0.1-1 mL  0.1-1 mL Other Q1H PRN         magnesium hydroxide (MILK OF MAGNESIA) suspension 15-30 mL  15-30 mL Oral Daily PRN   30 mL at 09/19/21 0843     melatonin tablet 3 mg  3 mg Oral At Bedtime PRN         naloxone (NARCAN) injection 0.2 mg  0.2 mg Intravenous Q2 Min PRN        Or     naloxone (NARCAN) injection 0.4 mg  0.4 mg Intravenous Q2 Min PRN        Or     naloxone (NARCAN) injection 0.2 mg  0.2 mg Intramuscular Q2 Min PRN        Or     naloxone (NARCAN) injection 0.4 mg  0.4 mg Intramuscular Q2 Min PRN         ondansetron (ZOFRAN-ODT) ODT tab 4 mg  4 mg Oral Q6H PRN        Or     ondansetron (ZOFRAN) injection 4 mg  4 mg Intravenous Q6H PRN   4 mg at 09/18/21 1817     oxyCODONE (ROXICODONE) tablet 5-10 mg  5-10 mg Oral Q4H PRN   10 mg at 09/18/21 2109     polyethylene glycol (MIRALAX) Packet 17 g  17 g Oral Daily   17 g at 09/19/21 0758     potassium & sodium phosphates (NEUTRA-PHOS) Packet 1 packet  1 packet Oral TID   1 packet at 09/19/21 0838     predniSONE (DELTASONE) tablet 5 mg  5 mg Oral BID w/meals   5 mg at 09/19/21 0758     senna-docusate (SENOKOT-S/PERICOLACE) 8.6-50 MG per tablet 2 tablet  2 tablet Oral At Bedtime PRN         simethicone (MYLICON) chewable tablet 160 mg  160 mg Oral 4x Daily PRN         sodium chloride (PF) 0.9% PF flush 3 mL  3 mL Intracatheter Q8H   3 mL at 09/19/21 0759     sodium chloride (PF) 0.9% PF flush 3 mL  3 mL Intracatheter q1 min prn         sodium chloride 0.9% infusion   Intravenous Continuous 75 mL/hr at 09/19/21 0843 New Bag at 09/19/21 0843     sodium phosphate (FLEET ENEMA) 1 enema  1 enema Rectal Once         traZODone (DESYREL) tablet  50 mg  50 mg Oral At Bedtime   50 mg at 09/18/21 2109     No current [facility]-ordered outpatient medications on file.                  Physical Exam:   Vitals were reviewed  Patient Vitals for the past 8 hrs:   BP Temp Temp src Pulse Resp SpO2 Weight   09/19/21 0806 128/74 98.3  F (36.8  C) Oral 87 16 97 % --   09/19/21 0545 -- -- -- -- -- -- 99.1 kg (218 lb 6.4 oz)              Data:     Lab Results   Component Value Date    CR 1.36 (H) 09/19/2021    CR 1.24 09/18/2021    CR 1.11 09/17/2021    CR 1.15 09/16/2021    CR 1.23 09/16/2021     Lab Results   Component Value Date    WBC 15.7 (H) 09/19/2021    WBC 14.9 (H) 09/18/2021    WBC 16.4 (H) 09/17/2021    HGB 9.0 (L) 09/19/2021    HGB 8.8 (L) 09/18/2021    HGB 9.1 (L) 09/17/2021    HCT 28.5 (L) 09/19/2021    HCT 27.6 (L) 09/18/2021    HCT 27.8 (L) 09/17/2021    MCV 94 09/19/2021    MCV 94 09/18/2021    MCV 94 09/17/2021     09/19/2021     09/18/2021     09/17/2021     Lab Results   Component Value Date    INR 1.02 09/17/2021    INR 1.13 03/23/2020

## 2021-09-19 NOTE — PLAN OF CARE
End of Shift Summary  For vital signs and complete assessments, please see documentation flowsheets.     Pertinent assessments: A&Ox4. Abd discomfort/pain/bloating, Oxycodone effective. Denies nausea. YADAV. BLE edema, encouraged leg elevation. Chronic cooper with adequate output. Amb in halls x2. PRN milk of mag for constipation, no results.      Major Shift Events: Weight increase 13 lbs since admission.   Treatment Plan: Wait for biospy results to determine tx plan. Cipro. Monitor labs. Phosphors replaced

## 2021-09-19 NOTE — CONSULTS
"NUTRITION ASSESSMENT    REASON FOR NUTRITION CONSULT:  Provider Order  -  \"Patient/Family request\"     ASSESSMENT:  Please refer to full nutrition assessment completed on 9/16 for more information     FOLLOW UP:   Will follow up when patient and/or dietitian available    Olivia Mondragon RD, LD  Clinical Dietitian         "

## 2021-09-19 NOTE — PLAN OF CARE
Pt up SBA with walker & belt. A&Ox4. Abd discomfort/pain/bloating, giving Oxycodone, dose increased, stated relief. Nausea with eating, premedicated with Zofran, stated eff. Possible constipation, wanted to hold off on bowel meds until later tonight. YADAV. BLE, encouraged leg elevation. João diet, appetite fair. Chronic cooper with adequate output. Phos 2.2, replacing. Low grade fevers. Amb in halls x2, sat up in chair. Rocephin switched to Cipro.

## 2021-09-19 NOTE — PROGRESS NOTES
Oncology/Hematology Follow Up Note:    Assessment and Plan:  Andre Serrano is a 65 year old male who was admitted on 9/15/2021. I was asked to see the patient for history of metastatic prostate cancer.      Assessment:  Plan:  1.  Metastatic prostate cancer with second metastasis to lymph nodes and bones:  -Initial diagnosis in March 2020: Baseline PSA > 700:   -Current treatment is ADT plus abiraterone  -PSA from 9/14/2021 was 1.4  -However CT scan findings show clear progression with massive enlargement of intra-abdominal lymphadenopathy. Images reviewed and agree.     Plan   -Discussed with Dr. Nguyen, the discordant findings between PSA and CT scan is worrisome for either neuroendocrine differentiation.  Differential diagnosis include possibility of a different histology such as lymphoma.  -Patient had a retroperitoneal lymph node biopsy done yesterday. Results pending.  -Likely would require change in treatment to chemotherapy. Discussed with Negrito, need biopsy results first. No update on path this weekend and maybe till early to mid week to get results.  - can arrange OP Follow-up to d/w path if discharged   .      2.  Bilateral hydronephrosis  -Patient was seen by urology yesterday  -He will likely need stent placements, however this was deferred to outpatient.  -May still need stent placement and hospital if creatinine gets worse. PEr urology, possibly tomorrow with increase in creat     3.  Hypercalcemia  - IV hydration  -Had 1 dose of Zometa on 9/16/2021  -Calcium is 9.9 today.   Plan   -I rehydration continue to monitor     4.  Anemia  -Likely secondary to underlying metastatic prostate cancer  Plan   -We will transfuse with hemoglobin less than 7.0      5.  Constipation  -Likely secondary to hypercalcemia     Plan   - Aggressive bowel regimen  -Please give milk of magnesia, Senokot daily till has good bowel movements  -Enema as needed.   - d/w RN and will try the above, hasn't had MOM today     6. LE  edema and bloating- likely from LN bulky, no calf pain    Subjective:     Had a bM after enema, still bloated      Labs:  All labs reviewed    CBCRecent Labs   Lab 09/19/21  0712 09/18/21  0758 09/18/21  0758 09/17/21  0652 09/17/21  0652   WBC 15.7*  --  14.9*  --  16.4*   HGB 9.0*  --  8.8*  --  9.1*   MCV 94   < > 94   < > 94     --  390  --  353    < > = values in this interval not displayed.       CMP  Recent Labs   Lab 09/19/21  0712 09/18/21  1559 09/18/21  0758 09/18/21  0438 09/17/21  0652 09/16/21  0644 09/16/21  0644 09/16/21  0003 09/16/21  0003 09/15/21  1428 09/15/21  1428   *  --  126*  --  124*  --  126*   < > 125*   < > 124*   POTASSIUM 4.6  --  4.1  --  4.2  --  4.1   < > 4.3   < > 4.2   CHLORIDE 93*  --  95  --  96  --  95   < > 95   < > 92*   CO2 25  --  23  --  22  --  23   < > 27   < > 25   ANIONGAP 7  --  8  --  6  --  8   < > 3   < > 7   GLC 97  --  94  --  99  --  85   < > 87   < > 94   BUN 20  --  18  --  17  --  16   < > 18   < > 18   CR 1.36*  --  1.24  --  1.11  --  1.15   < > 1.23   < > 1.30*   GFRESTIMATED 53*  --  60*  --  69  --  66   < > 61   < > 57*   JAXSON 9.8  --  9.9  --  10.4*  --  10.5*   < > 11.4*   < > 11.8*   MAG  --   --   --   --  2.1  --  2.1  --   --   --   --    PHOS 2.1* 2.2*  --  2.2* 1.4*   < > 1.6*   < > 1.7*  --   --    PROTTOTAL 6.9  --  6.4*  --  6.6*  --   --   --   --   --  7.2   ALBUMIN 2.3*  --  2.1*  --  2.1*  --   --   --  2.3*   < > 2.4*   BILITOTAL 0.4  --  0.6  --  0.2  --   --   --   --   --  0.5   ALKPHOS 191*  --  147  --  164*  --   --   --   --   --  164*   *  --  48*  --  48*  --   --   --   --   --  55*   ALT 20  --  14  --  17  --   --   --   --   --  18    < > = values in this interval not displayed.       INR  Recent Labs   Lab 09/17/21  0652   INR 1.02       Blood CultureNo results for input(s): CULT in the last 168 hours.      Beatris Dominguez MD  [facility]  9/19/2021 11:55 AM

## 2021-09-19 NOTE — PROGRESS NOTES
09/19/21 1036   Quick Adds   Type of Visit Initial PT Evaluation   Living Environment   People in home alone   Current Living Arrangements apartment   Living Environment Comments Pt lives in apartment, elevator access. Has friends who are very supportive   Self-Care   Usual Activity Tolerance good   Current Activity Tolerance moderate   Equipment Currently Used at Home none   Activity/Exercise/Self-Care Comment IND at baseline   Disability/Function   Fall history within last six months no   Change in Functional Status Since Onset of Current Illness/Injury yes   General Information   Onset of Illness/Injury or Date of Surgery 09/15/21   Referring Physician Lc Haji MD    Patient/Family Therapy Goals Statement (PT) to get stronger and go home    Pertinent History of Current Problem (include personal factors and/or comorbidities that impact the POC) Patient has been dealing with progressively worsening generalized abdominal pain, abdominal distention and constipation   Existing Precautions/Restrictions fall   General Observations Friends present and supportive   Cognition   Orientation Status (Cognition) oriented x 3   Pain Assessment   Patient Currently in Pain Yes, see Vital Sign flowsheet  (abd pain, LE swelling and volume overload, discomfort)   Integumentary/Edema   Integumentary/Edema Comments 2+ pitting edema in BLE, swelling in abdomin as well, pt able to mobilize    Range of Motion (ROM)   ROM Comment DF to neutral.    Strength   Strength Comments pt demonstrates gross functional weakness with OOB mobility, requires mod A to stand from chair with use of BUE push off and FWW    Bed Mobility   Comment (Bed Mobility) SIt > supine mod A to elevate BLE into bed    Transfers   Transfer Safety Comments STS with FWW mod A, BUE push off    Gait/Stairs (Locomotion)   Comment (Gait/Stairs) 5' eval with FWW, close CGA, low ft clearance, difficulty maintaining safe proximity to walker    Balance   Balance Comments  fair-good dynamic balance with use of FWW    Sensory Examination   Sensory Perception Comments intact to light touch    Clinical Impression   Criteria for Skilled Therapeutic Intervention yes, treatment indicated   PT Diagnosis (PT) impaired IND with mobility from baseline   Influenced by the following impairments medical condition, edema, weakness, balance, activity tolerance   Functional limitations due to impairments see above   Clinical Presentation Evolving/Changing   Clinical Presentation Rationale clinical judgement    Clinical Decision Making (Complexity) moderate complexity   Therapy Frequency (PT) Daily   Predicted Duration of Therapy Intervention (days/wks) 1 week   Planned Therapy Interventions (PT) balance training;bed mobility training;gait training;stair training;strengthening;transfer training   Risk & Benefits of therapy have been explained evaluation/treatment results reviewed;care plan/treatment goals reviewed;patient   PT Discharge Planning    PT Discharge Recommendation (DC Rec) Transitional Care Facility   PT Rationale for DC Rec Recommend TCU at [place] to improve strength, balance, activity tolerance as pt far below baseline at this time and lives alone    Total Evaluation Time   Total Evaluation Time (Minutes) 15

## 2021-09-19 NOTE — PROGRESS NOTES
[facility]    Hospitalist Progress Note             Date of Admission:  9/15/2021                   Day of hospitalization: 4    Assessment and Plan:   Andre Serrano is a 65 year old male who was sent from [facility] emergency room as a direct admission because they did not have any beds.     Patient has a past medical history significant for metastatic prostate cancer with bone metastasis (follows up with [facility] and [facility]), constipation, dyslipidemia, sleep apnea.     Patient has been dealing with progressively worsening generalized abdominal pain, abdominal distention and constipation.  His oral intake has been very poor due to the symptoms.  He also gives a history of shortness of breath but not orthopnea.  He has been trying to deal with his constipation for the last many weeks with the help of his primary care physician but with little success.  He has tried fairly high dose of Senokot without any relief.  Milk of magnesia usually works for him a little bit.     He saw his oncologist yesterday and explained to him his symptoms of abdominal pain, distention and constipation.  He was supposed to get an IV infusion at that time but it was not given.  His oncologist recommended getting a CT scan which was done today.  The CT scan was done at an outside imaging facility and we do not have access to either the report or the images.  Because patient was not feeling quite well he finally decided to go to the emergency room at [facility].     Lab work done in the ER showed hyponatremia of 124 and hypercalcemia.  Creatinine was at baseline.  CT PE study was done which was negative.  The ER physician managed to talk to the radiologist who mentioned massive retroperitoneal lymphadenopathy as well as in the mesentery causing a ureteral obstruction.  We do not have the full radiology report.  Due to these findings as well as metabolic abnormalities, medical team admission was  recommended.  Because they did not have any beds patient was transferred to our facility.  He was also given 1 g of ceftriaxone due to pyuria.  Patient has a chronic Cooper catheter in place which was changed on Monday in urology office.     Metastatic prostate cancer to lymph nodes and bone: Follows with [facility]. PSA 1.4 9/14/21. Diagnosis March 2020. OSH CT scan showed progression with massive enlargement of intra-abdominal lymph nodes  -Oncology consulted - concern for neuroendocrine differentiation vs lymphoma  -Palliative care consulted - provide additional ongoing support and symptom mangement  -IR consulted for lymph node biopsy 9/17/21 - pathology pending   -Defer medical management of cancer to Onc - possible change in treatment to chemotherapy.      Complicated urinary tract infection with chronic indwelling cooper catheter, citrobacter freundii: Patient empirically started on Ceftriaxone. Cooper exchanged 9/13/21. Ceftriaxone 9/15-9/18. Started on oral ciprofloxacin 500 mg BID to complete 10 day course of abx for complicated UTI     Chronic urinary retention s/p chronic cooper; bilateral hydronephrosis secondary to lymphadenopathy due to ureteral obstruction: Patient follows with Urology as an outpatient. Cooper catheter exchanged 9/13/21  -Urology consulted - appreciate assistance - recommended bilateral ureteral stent placement/nephrostomies with primary urologist (Dr. Kolb); His renal function seems to be worsening thus urology has been re consulted to evaluate need for bilateral nephrostomy tubes     Acute kidney injury  - suspect related to his bilateral hydronephrosis, urology has been consulted  - will try a trial of IVF    Severe hypercalcemia: Calcium 11.8. Ionized calcium 6.1. No clear symptoms of hypercalcemia. Known metastatic cancer. Calcium levels improve with IVF, lasix x1, and Zometa once.   -Heme/onc following - appreciate assistance  -Improved calcium levels -  "monitor     Hypophosphatemia: Phosphorus 1.6. Replacement protocol.      Mild hyponatremia: Peripheral edema but intravascularly dry. Sodium improved with IVF. IVF stopped 9/17/21. Lasix 40 mg IV once with good urine output but given slight increase in Cr.  started on IVF again today, will monitor sodium closely    Chronic anemia: Hemoglobin 8.6. Monitor     Constipation due to extensive lymphadenopathy: Continue medications. Patient having BMs but feels like his abdomen is distended. This is likely secondary to lymphadenopathy. Per patient no bowel movement since Tuesday, will provide enemas today, is passing flatus.  - Xray abdomen ordered to evaluate for any acute changes, may consider CT non contrast abdomen if continues to worsen      Insomnia: Trazodone q HS     Non-severe malnutrition: RD consulted; complicates cares     FEN: Oral hydration; monitor; regular  Activity: As tolerated  DVT Prophylaxis: Enoxaparin (Lovenox) SQ  Family update: Girl friend and nephew at bedside updated     Code Status: Full Code                    Hannah Lopez MD  Text Page (7am - 6pm, M-F)               Subjective   Chief Complaint: Abdominal distension  Patient feels his abdominal distention is getting worse including his lower extremity edema and feels he has been gaining weight while he is in the hospital.  He denies any nausea or vomiting but does have early satiety symptoms and poor oral intake.  Otherwise he has his chronic dyspnea on exertion which he feels is getting worse since his abdominal distention is getting worse as well.  Otherwise no fevers chills cough chest pain        Objective   /74 (BP Location: Right arm)   Pulse 87   Temp 98.3  F (36.8  C) (Oral)   Resp 16   Ht 1.753 m (5' 9\")   Wt 99.1 kg (218 lb 6.4 oz)   SpO2 97%   BMI 32.25 kg/m       Physical Exam  General: Pt in NAD, normal appearance  HEENT: OP clear MMM, no JVD  Lungs: Clear to Auscultation Bilateral, normal breathing  without " accessory muscle usage, no wheezing, rhonchi or crackles  Cardiac: +S1, S2, RRR, no MRG, 2 edema bilateral lower extremity  Abdominal: normal bowel sounds, nontender but distended  Skin: warm, dry, normal turgor, no rash  Psyche: A& O x3, appropriate affect             Intake/Output Summary (Last 24 hours) at 9/19/2021 1156  Last data filed at 9/18/2021 2228  Gross per 24 hour   Intake --   Output 1185 ml   Net -1185 ml           Labs and Imaging Results:      Recent Labs   Lab 09/19/21  0712 09/18/21  0758   WBC 15.7* 14.9*   HGB 9.0* 8.8*    390        Recent Labs   Lab 09/19/21  0712 09/18/21  0758   * 126*   CO2 25 23   BUN 20 18        Recent Labs   Lab 09/17/21  0652   INR 1.02      No results for input(s): CKMB in the last 168 hours.    Invalid input(s): TROPONINT     Recent Labs   Lab 09/19/21  0712 09/18/21  0758   ALBUMIN 2.3* 2.1*   * 48*   ALT 20 14   ALKPHOS 191* 147        Micro:     Radio:  CT Abdomen Retroperitoneal Biopsy   Preliminary Result   IMPRESSION: Successful CT-guided biopsy of retroperitoneal soft   tissue. Sample sent to the lab for evaluation.      XR Abdomen 1 View    (Results Pending)           Medications:      Scheduled Meds:      ciprofloxacin  500 mg Oral Q12H GEN     enoxaparin ANTICOAGULANT  40 mg Subcutaneous Q24H     gabapentin  100 mg Oral BID     polyethylene glycol  17 g Oral Daily     potassium & sodium phosphates  1 packet Oral TID     predniSONE  5 mg Oral BID w/meals     sodium chloride (PF)  3 mL Intracatheter Q8H     sodium phosphate  1 enema Rectal Once     traZODone  50 mg Oral At Bedtime     Continuous Infusions:      sodium chloride 75 mL/hr at 09/19/21 0843     PRN Meds:  acetaminophen **OR** acetaminophen, albuterol, bisacodyl, HOLD MEDICATION, lidocaine 4%, lidocaine (buffered or not buffered), magnesium hydroxide, melatonin, naloxone **OR** naloxone **OR** naloxone **OR** naloxone, ondansetron **OR** ondansetron, oxyCODONE, senna-docusate,  simethicone, sodium chloride (PF)

## 2021-09-19 NOTE — PLAN OF CARE
"End of shift summary- 0700-1530    A/O:x4  Diet: Regular diet - 2000 FR (new). NPO @ 0000  Transfer: A1 w/ walker   Bathroom: chronic cooper - changed last Monday by urology   Pain: 5-7 (baseline per patient). Has been like this for months  Treatment: IVF @ 75, Phos replacement - done today and recheck in AM ordered, urology following, PT recommending TCU d/t deconditioning, IV ABX, biopsy pending. Enema given around 1330  Discharge Plans: tbd - lives alone. PT recommending TCU         Blood pressure 128/74, pulse 87, temperature 98.3  F (36.8  C), temperature source Oral, resp. rate 16, height 1.753 m (5' 9\"), weight 99.1 kg (218 lb 6.4 oz), SpO2 97 %.   "

## 2021-09-20 NOTE — ANESTHESIA PREPROCEDURE EVALUATION
Anesthesia Pre-Procedure Evaluation    Patient: Andre Serrano   MRN: [id number] : 1955        Preoperative Diagnosis: Acute kidney injury (ANITA) with acute tubular necrosis (ATN) (H) [N17.0]  Hydronephrosis, unspecified hydronephrosis type [N13.30]   Procedure : Procedure(s):  CYSTOURETEROSCOPY, WITH RETROGRADE PYELOGRAM AND STENT INSERTION     Past Medical History:   Diagnosis Date     Cancer (H)      Heavy alcohol use      Hypercholesteremia      Lower urinary tract symptoms (LUTS)       Past Surgical History:   Procedure Laterality Date     APPENDECTOMY  1972     HERNIORRHAPHY INGUINAL INFANT        No Known Allergies   Social History     Tobacco Use     Smoking status: Passive Smoke Exposure - Never Smoker     Smokeless tobacco: Never Used     Tobacco comment: occasion   Substance Use Topics     Alcohol use: Yes     Alcohol/week: 16.7 standard drinks     Types: 20 Cans of beer per week      Wt Readings from Last 1 Encounters:   21 101.7 kg (224 lb 4.8 oz)        Anesthesia Evaluation   Pt has had prior anesthetic.         ROS/MED HX  ENT/Pulmonary:     (+) sleep apnea,     Neurologic:       Cardiovascular:     (+) Dyslipidemia -----    METS/Exercise Tolerance:     Hematologic:     (+) anemia,     Musculoskeletal:       GI/Hepatic: Comment: etoh      Renal/Genitourinary: Comment: UTI    (+) renal disease, type: ARF,     Endo:     (+) Obesity,     Psychiatric/Substance Use:       Infectious Disease:       Malignancy:   (+) Malignancy, History of Prostate.    Other:            Physical Exam    Airway        Mallampati: II   TM distance: > 3 FB   Neck ROM: full   Mouth opening: > 3 cm    Respiratory Devices and Support         Dental           Cardiovascular             Pulmonary                   OUTSIDE LABS:  CBC:   Lab Results   Component Value Date    WBC 18.7 (H) 2021    WBC 15.7 (H) 2021    HGB 9.8 (L) 2021    HGB 9.0 (L) 2021    HCT 30.5 (L) 2021    HCT 28.5 (L)  09/19/2021     09/20/2021     09/19/2021     BMP:   Lab Results   Component Value Date     (L) 09/20/2021     (L) 09/19/2021    POTASSIUM 4.6 09/20/2021    POTASSIUM 4.6 09/19/2021    CHLORIDE 94 09/20/2021    CHLORIDE 93 (L) 09/19/2021    CO2 21 09/20/2021    CO2 25 09/19/2021    BUN 25 09/20/2021    BUN 20 09/19/2021    CR 1.77 (H) 09/20/2021    CR 1.36 (H) 09/19/2021    GLC 90 09/20/2021    GLC 97 09/19/2021     COAGS:   Lab Results   Component Value Date    INR 1.02 09/17/2021     POC: No results found for: BGM, HCG, HCGS  HEPATIC:   Lab Results   Component Value Date    ALBUMIN 2.3 (L) 09/19/2021    PROTTOTAL 6.9 09/19/2021    ALT 20 09/19/2021     (H) 09/19/2021    ALKPHOS 191 (H) 09/19/2021    BILITOTAL 0.4 09/19/2021     OTHER:   Lab Results   Component Value Date    LACT 1.2 09/15/2021    JAXSON 9.1 09/20/2021    PHOS 2.9 09/20/2021    MAG 2.1 09/17/2021    LIPASE 95 09/15/2021       Anesthesia Plan    ASA Status:  3   NPO Status:  NPO Appropriate    Anesthesia Type: General.     - Airway: LMA   Induction: Intravenous, Propofol.   Maintenance: Balanced.        Consents    Anesthesia Plan(s) and associated risks, benefits, and realistic alternatives discussed. Questions answered and patient/representative(s) expressed understanding.     - Discussed with:  Patient         Postoperative Care    Pain management: IV analgesics.   PONV prophylaxis: Ondansetron (or other 5HT-3), Dexamethasone or Solumedrol     Comments:                Nima Vernon MD

## 2021-09-20 NOTE — PROGRESS NOTES
"[facility] Progress Note   Med. Surg. 5    Saw pt Andre Serrano per his length of stay.  Offered reflective conversation to facilitate the processing of thoughts and feelings.      Illness Narrative -   o Negrito reported that he was diagnosed with metastatic prostate cancer eighteen months ago and has been undergoing treatment for it.  About four month ago he started experiencing abdominal discomfort that has become worse over time.  Negrito has learned that he has \"about a dozen super enlarged lymph nodes, the size of oranges\" in his abdomen that are pressing on his other organs.  In the last several days he has gained weight from fluid retention and his legs have become swollen.    o He is waiting to hear the report from the biopsy of the lymph nodes and is expecting to have a ureter stent placement this afternoon      Distress - Negrito denied any concerns and reflected that he keeps \"a positive mental attitude.\"  He acknowledged that the hardest part of his hospitalization is waiting to learn more about his treatment plan: \"I'm in limbo right now.\"      Coping -   o Negrito reported that he has \"good support\" from local friends.  His mother and sister live in [place].  o Negrito's spirituality is more private than communal.  He mentioned that he is praying and that his friends are praying for him.  o Negrito is retired and enjoys spending time at his cabin, traveling (especially to [facility]), and spending the holidays with his mother and sister.       Meaning-Making - Negrito reflected that he saw a lot of suffering when he worked as a  and shared that his work helped him to develop \"a positive outlook.\"    Provided emotional support through reflective listening, validation of feelings, and affirmation of his support system.      Plan - Informed pt how he can request further  support.  This author and other chaplains remain available per pt/family request.    Doroteo Castrejon, " [place], [facility]  Staff   Phone [phone]

## 2021-09-20 NOTE — ANESTHESIA PROCEDURE NOTES
Airway       Patient location during procedure: OR       Procedure Start/Stop Times: 9/20/2021 1:59 PM  Staff -        Anesthesiologist:  Phil Little MD       CRNA: Emilee Colmenares APRN CRNA       Performed By: CRNA  Consent for Airway        Urgency: elective  Indications and Patient Condition       Indications for airway management: kashif-procedural       Induction type:intravenous       Mask difficulty assessment: 1 - vent by mask    Final Airway Details       Final airway type: supraglottic airway    Supraglottic Airway Details        Type: LMA       Brand: I-Gel       LMA size: 5    Post intubation assessment        Placement verified by: capnometry, equal breath sounds and chest rise        Number of attempts at approach: 1       Number of other approaches attempted: 0       Secured with: plastic tape       Ease of procedure: easy       Dentition: Intact and Unchanged

## 2021-09-20 NOTE — PROGRESS NOTES
"  [facility] Progress Note          Assessment and Plan:      Active Problems:  Andre Serrano is a(n) 65 year old male metastatic prostate cancer with progression on ADT plus Zytiga.  Medical oncology is following and has planned for possible lymph node biopsy from the metastatic site consideration for possible neuroendocrine differentiation in view of the lower PSA values in contrast to the large burden of metastasis.  He also has bilateral ureteral obstruction due to the same metastatic retroperitoneal lymphadenopathy. Patient is being followed with Dr. Kolb at [facility].       Rising serum creatinine  -Serum creatinine has risen slightly over the past 2 days up to 1.77 today from a baseline of about 1.15   - Schedule for  ureteral stent placement vs nephrostomy tube placement tomorrow   - I discussed in detail with Andre about the plan for bilateral ureteral stent placement. We also iscussed the possibility of being unable to pass the stent across the site of obstruction and the likely need for a nephrostomy placement. We also discussed that the stents may fail later and the nephrostomies may become the likely alternative.  - he was agreeable to the plan and agreed to proceed with bilateral stent placement.  - Final consent will be signed prior to the procedure.  Jm Scott MD   [facility]  Office: [phone]     O:  Vitals: /66 (BP Location: Left arm)   Pulse 98   Temp 98.2  F (36.8  C) (Oral)   Resp 20   Ht 1.753 m (5' 9\")   Wt 101.7 kg (224 lb 4.8 oz)   SpO2 94%   BMI 33.12 kg/m    General: Alert, interactive, in NAD  Resp: Non-labored breathing on RA  Abdomen: Soft, non-tender, moderately distended.   Ext: Warm and well perfused   Pride: Samanta:      I/O last 3 completed shifts:  In: 889 [P.O.:500; I.V.:389]  Out: 775 [Urine:775] - Last 24 hours      Labs/Imaging  Heme:Recent Labs   Lab 09/20/21  0722 09/19/21  0712 09/18/21  0758 09/17/21  0652 "   WBC 18.7* 15.7* 14.9* 16.4*   HGB 9.8* 9.0* 8.8* 9.1*    405 390 353     Chem:  Recent Labs   Lab 09/20/21  0722 09/19/21  0712 09/18/21  0758 09/17/21  0652   POTASSIUM 4.6 4.6 4.1 4.2   CR 1.77* 1.36* 1.24 1.11       --    Jm Scott MD,   Urology

## 2021-09-20 NOTE — ANESTHESIA CARE TRANSFER NOTE
Patient: Andre Serrano    Procedure(s):  CYSTOURETEROSCOPY, WITH RETROGRADE PYELOGRAM AND STENT INSERTION    Diagnosis: Acute kidney injury (ANITA) with acute tubular necrosis (ATN) (H) [N17.0]  Hydronephrosis, unspecified hydronephrosis type [N13.30]  Diagnosis Additional Information: No value filed.    Anesthesia Type:   General     Note:    Oropharynx: oropharynx clear of all foreign objects  Level of Consciousness: awake  Oxygen Supplementation: face mask  Level of Supplemental Oxygen (L/min / FiO2): 6 lpm  Independent Airway: airway patency satisfactory and stable  Dentition: dentition unchanged  Vital Signs Stable: post-procedure vital signs reviewed and stable  Report to RN Given: handoff report given  Patient transferred to: PACU  Comments: Patient with spontaneous respirations and adequate tidal volumes. Patient awake and responsive. LMA removed in OR to 6L facemask. To PACU ventilating well. VSS. Report given.    Handoff Report: Identifed the Patient, Identified the Reponsible Provider, Reviewed the pertinent medical history, Discussed the surgical course, Reviewed Intra-OP anesthesia mangement and issues during anesthesia, Set expectations for post-procedure period and Allowed opportunity for questions and acknowledgement of understanding      Vitals:  Vitals Value Taken Time   /75 09/20/21 1436   Temp 97.4  F (36.3  C) 09/20/21 1436   Pulse 89 09/20/21 1437   Resp 20 09/20/21 1437   SpO2 99 % 09/20/21 1437   Vitals shown include unvalidated device data.    Electronically Signed By: DOE Winters CRNA  September 20, 2021  2:39 PM

## 2021-09-20 NOTE — PLAN OF CARE
Pertinent assessments: A&Ox4. Neuros intact. Up ax1 w/walker. VSS. RA. Abd discomfort/pain/bloating, Oxycodone effective. Denies nausea. YADAV. BLE edema, encouraged leg elevation. Chronic cooper with adequate output. Amb in halls x1.    Major Shift Events: Abd CT completed. Stent placement procedure 1330.    Treatment Plan: Wait for biospy results to determine tx plan. Cipro. Monitor labs.    Bedside Nurse: Celine Feng RN

## 2021-09-20 NOTE — PLAN OF CARE
Pertinent assessments: A&Ox4. Up ax1 w/walker. VSS. RA. Abd discomfort/pain/bloating, Oxycodone effective. Denies nausea. YADAV. BLE edema, encouraged leg elevation. Chronic cooper with adequate output. Amb in halls x2. Neuros intact. Bowel meds and enema during the day. 1 BM during in the evening. NPO at midnight.     Major Shift Events: Uneventful    Treatment Plan: Wait for biospy results to determine tx plan. Cipro. Monitor labs. Phosphors replaced. Plan for stents vs neph tubes in IR tomorrow.     Bedside Nurse: Karly Rosales RN

## 2021-09-20 NOTE — OP NOTE
Procedure Date: 09/20/2021    SURGEON:  Roberto Virgen M.D.    PREOPERATIVE DIAGNOSES:  Bilateral ureteral obstruction with bilateral hydronephrosis, prostate cancer.    POSTOPERATIVE DIAGNOSES:  Bilateral ureteral obstruction with bilateral hydronephrosis, prostate cancer.    PROCEDURES PERFORMED:  Cystoscopy, bilateral retrograde pyelograms, interpretation of fluoroscopic images, placement of 7 x 24 double-J ureteral stent.    ANESTHESIA:  General.    COMPLICATIONS:  None.    INDICATIONS FOR PROCEDURE:  Andre Serrano is a 66-year-old gentleman with metastatic prostate cancer who has developed bilateral ureteral obstruction with hydronephrosis.  He has had a rising creatinine and elevated fluid status.  He now presents for bilateral ureteral stent placement.    DESCRIPTION OF PROCEDURE:  The risks and benefits of the procedure were explained in detail to the patient, informed consent obtained.  The patient was brought to the operating room and placed supine on the operating table where he underwent general anesthetic.  He was then moved down to the dorsal lithotomy position.  He was prepped and draped in standard sterile fashion.  I introduced the 22-Omani cystoscope through the urethra and into the bladder, performing cystoscopy.  There was some obliteration over all the bladder neck from his prostate cancer.  The rest of the bladder was normal throughout.  Each ureteral orifice was found a higher location and expected, further towards the dome of the bladder.  The right-sided ureteral orifice was identified and cannulated with ureteral catheter.  I performed a retrograde pyelogram.  There was found to be hydronephrosis and hydroureter throughout most of the ureter on this side.  I passed a Glidewire into the kidney and backed off the catheter.  I then passed a 7 x 24 double-J ureteral stent with a wire.  Wire was pulled back and a good curl, could be seen in the renal pelvis on fluoroscopy.  Good curl could  be seen in the bladder under direct visualization.  I then cannulated the left orifice with ureteral catheter and performed retrograde pyelogram.  On this left side, there was found to be obstruction higher up, likely at the left ureteropelvic junction.  I passed a Glidewire into the left kidney and backed off the ureteral catheter.  I then passed another 7 x 24 double-J ureteral stent with a wire.  Wire was pulled back and a good curl was seen in the renal pelvis with fluoroscopy.  A good curl was seen in the bladder under direct visualization.  The scope was removed.  The 16-Kittitian coude tip Pride catheter was placed. The procedure was concluded at this point.    The patient tolerated the procedure without complications.  He will go to the hospital for further monitoring from there.  The patient already has had a chronic indwelling Pride catheter in place, and now he will need to have bilateral ureteral stents in place under chronically as well.    Roberto Virgen MD        D: 2021   T: 2021   MT: [id number]    Name:     KAROL SANTOS  MRN:      [id number]        Account:        [id number]   :      1955           Procedure Date: 2021     Document: [id number]

## 2021-09-20 NOTE — PROGRESS NOTES
[facility]    Hematology / Oncology f/u     Date of Admission:  9/15/2021    Assessment & Plan   Andre Serrano is a 65 year old male who was admitted on 9/15/2021. I was asked to see the patient for history of metastatic prostate cancer.     Assessment:  Plan:  1.  Metastatic prostate cancer with second metastasis to lymph nodes and bones:  -Initial diagnosis in March 2020: Baseline PSA > 700:   -Current treatment is ADT plus abiraterone  -PSA from 9/14/2021 was 1.4  -However CT scan findings show clear progression with massive enlargement of intra-abdominal lymphadenopathy.   - Discussed with Dr. Nguyen, the discordant findings between PSA and CT scan is worrisome for either neuroendocrine differentiation.  Differential diagnosis include possibility of a different histology such as lymphoma.  ---Patient completed retroperitoneal lymph node biopsy on 9/17/2021 results pending.     Plan   -Await pathology results to determine next steps in treatment  -Likely would require change in treatment to chemotherapy.    2.  Bilateral hydronephrosis/ ANITA   -Patient was seen by urology.  -Worsening creatinine over the weekend.  Creatinine today 1.77 despite adequate hydration.     Plan   -Patient scheduled for cystoscopy and possible stent placement today.  If not available may need nephrostomy tubes.   -Please request nephrology consult.   -Patient is at risk for acute renal failure.     3.  Hypercalcemia  - IV hydration  -Had 1 dose of Zometa on 9/16/2021  -Calcium is 9.1 today.   Plan   -IV rehydration, to continue  -However, adjust IV fluids as per nephrology recommendations.     4.  Anemia  -Likely secondary to underlying metastatic prostate cancer  Plan   -We will transfuse with hemoglobin less than 7.0     5.  Constipation  -Likely secondary to hypercalcemia.  -Hypercalcemia corrected.  -Had a bowel movement with enema    Plan   -Continue with aggressive bowel regimen  -Please give milk of magnesia,  Senokot daily till has good bowel movements  -Enema as needed.       Christo Jasso MD   [facility]  Office:  [phone]    Code Status    Full Code    Reason for Consult   Reason for consult: History of metastatic prostate cancer    Primary Care Physician   Chacho Galan    Chief Complaint     Abdominal pain.       History of Present Illness    Andre Serrano is a 65 year old male who presents with abdominal pain.  Patient has past medical history significant for metastatic prostate cancer, he follows with Dr. Dariel Nguyen.  He had previous history of elevated PSA.  Diagnosis was confirmed in March 2020 when he presented with rising PSA as well as metastatic intra-abdominal lymphadenopathy, and bone metastasis.   Lymph node biopsy from 2/23/2020 confirmed metastatic adenocarcinoma.  He was initially started on ADT, and abiraterone was added in May 2020 which is his current treatment.  He has had a good PSA response going down from an initial value of 713 in March 2020 to less than 0.5 in March 2021.   Patient reported that approximately 3 months ago he started experiencing new symptoms of abdominal pain.  He describes this as distention.  He had difficulty with bowels experiencing more constipation.  Symptoms were progressive.  He met with Dr. Nguyen recently, laboratory studies show a PSA of 1.4 however his calcium was elevated and because of his abdominal symptoms a CT scan of abdomen pelvis was requested which patient completed on 9/14/2021.  The CT scan showed clear evidence of progression with extensive worsening of intra-abdominal lymphadenopathy new lung metastasis.     CT scan abdomen/pelvis dose at [facility] : Large conglomerate adenopathy is identified diroughout the retroperitoneum with regions appearing fibrotic. For example, large adenopadiy at die retroperitoneum encasing the abdominal aorta and R7C in transverse plane is 13.2 X 6.1 cm, series 3 image 39. The crariiocaudal  extent is approximately 15.6 cm on coronal series 4 image 33. This  also encases portions of the renal vessels. The adenopathy contacts and surrounds the mid ureters bilaterally. This adenopadiy also extends into the upper and bilateral pelvis. An example at the left pelvic sidewall posterior to die external iliac vessels is 2.2 x 1.5 cm. An example on die right side adjacent to the external iliac vessels is 3.5 x 2.5 cm. There are other examples. There are multiple enlarged lymph nodes also seen widiiri the mesentery that appears contiguous with die retroperitoneal adenopathy, for mstarice series 3 image 105.    Patient was supposed to go back to see Dr. Nguyen however he ended up in the ER due to worsening abdominal pain there were no beds in [facility] and he was transferred over to [facility].       Oncologic chronology    Mr. Andre Serrano is a 65-year-old pleasant man with a history of elevated PSA in 09/2019. He had multiple  urinary symptoms including hesitancy, frequency and decrease in urine flow for a while, but worsened  significantly over few days. He was supposed to have a followup September, but lost to followup and he  came to the outpatient office with increasing abdominal pain and left flank pain. He had constipation a few days,  but he was not eating much and was found to have creatinine of 9 and was admitted to the hospital on 03/20/2020.  A Pride catheter was placed and felt better. CT scan of abdomen without contrast was done and it showed moderate  bilateral hydroureteronephrosis with the hydroureter extending to the distal aspect without evidence of urolithiasis. There is moderate asymmetric left  periureteral and perinepliric stranding. Mild nodular enlargement of the prostate and dense in the base of the  urinary bladder with regular layering hyperdensity, inseparable from the nodular protrusion of the prostate. There  is lymphadenopathy, diffuse retroperitoneal and  bilateral iliac chain, left periaortic lymph nodes at the level of the  left kidney 2.1 x 2.8 cm. Left external iliac lymph node 1.9 x 3.1 cm. Multiple lymph nodes adjacent to the  inferior mesenteric vasculature measuring 1.4 x 1.6 cm. Numerous bilateral internal iliac lymphadenopathy.  Multiple faint sclerotic lesions in the visualized spine and pelvis, most prominent in the subtrochanteric right  proximal femur, intertrochanteric left proximal femur consistent with metastatic disease.    Biopsy of the pelvic lymph node showed adenocarcinoma consistent with prostate cancer.  Bone scan was done on March 23 which showed widespread metastatic disease including humeri femurs sacrum and ribs in addition to majority of the spine.    He was given first LHRH analog Firmagon at urology office on March 31, 2020. Abiraterone prednisone regimen was started in May 2020 after approval of the regimen.    PSA in July was down to 2.2 normal. Decreased further in February to 0.5 and 0.4 in April 2021.    Interval history  9/17: Patient continues to remain very uncomfortable with abdominal bloating, has not had a good bowel movement for many weeks.  Becomes nauseous when she tries to eat.     9/20: Continues to be very uncomfortable, with abdominal bloating distention.  Has gained approximately 6 pounds in water weight since yesterday.  Continues to be very swollen in his lower extremities.  Have difficulty breathing nauseous.     Past Medical History   I have reviewed this patient's medical history and updated it with pertinent information if needed.   Past Medical History:   Diagnosis Date     Cancer (H)      Heavy alcohol use      Hypercholesteremia      Lower urinary tract symptoms (LUTS)        Past Surgical History   I have reviewed this patient's surgical history and updated it with pertinent information if needed.  Past Surgical History:   Procedure Laterality Date     APPENDECTOMY  1972     HERNIORRHAPHY INGUINAL INFANT          Prior to Admission Medications   Prior to Admission Medications   Prescriptions Last Dose Informant Patient Reported? Taking?   Psyllium 58.12 % PACK 9/15/2021 at Unknown time Self Yes Yes   Sig: Take 1 packet by mouth 3 times daily   SENEXON-S 8.6-50 MG tablet 9/15/2021 at Unknown time  No Yes   Sig: TAKE TWO TABLETS BY MOUTH TWICE A DAY   abiraterone (ZYTIGA) 250 MG tablet 9/15/2021 at 10:30 am  Yes Yes   Sig: Take 1,000 mg by mouth daily   leuprolide (ELIGARD) 45 MG kit More than a month at 5 months ago  Yes Yes   Si mg every 6 months    ondansetron (ZOFRAN-ODT) 4 MG ODT tab Past Month at Unknown time  Yes Yes   Sig: Take 4 mg by mouth daily as needed    polyethylene glycol (MIRALAX) 17 g packet 9/15/2021 at am  Yes Yes   Sig: Take 1 packet by mouth daily   predniSONE (DELTASONE) 5 MG tablet 9/15/2021 at Unknown time  Yes Yes   Sig: Take 5 mg by mouth 2 times daily    traZODone (DESYREL) 50 MG tablet 9/15/2021 at PM  No Yes   Sig: TAKE 1-2 TABLETS AT BEDTIME      Facility-Administered Medications: None     Allergies   No Known Allergies    Social History   I have reviewed this patient's social history and updated it with pertinent information if needed. Andre Serrano  reports that he is a non-smoker but has been exposed to tobacco smoke. He has never used smokeless tobacco. He reports current alcohol use of about 16.7 standard drinks of alcohol per week. He reports current drug use. Drug: Marijuana.    Family History   I have reviewed this patient's family history and updated it with pertinent information if needed.   Family History   Problem Relation Age of Onset     Prostate Cancer Father      Brain Cancer Father      Uterine Cancer Mother      Coronary Artery Disease Maternal Grandfather      Diabetes No family hx of        Review of Systems   Review of system positive for abdominal pain constipation swelling in lower extremities.  Decreased appetite.     Physical Exam   Temp: 98.2  F (36.8  C)  Temp src: Oral BP: 126/66 (sitting EOB) Pulse: 98   Resp: 20 SpO2: 94 % O2 Device: None (Room air)    Vital Signs with Ranges  Temp:  [98.1  F (36.7  C)-99.1  F (37.3  C)] 98.2  F (36.8  C)  Pulse:  [] 98  Resp:  [16-20] 20  BP: (126-150)/(66-86) 126/66  SpO2:  [90 %-95 %] 94 %  224 lbs 4.8 oz    Constitutional: Awake, alert, cooperative, no apparent distress. ECOG PS 2  GI: Soft, there is abdominal wall edema, abdomen is distended, firm.  There is mild diffuse tenderness.   lThere is bilateral lower extremity edema    Data   Treatment plan reviewed with patient

## 2021-09-20 NOTE — PROGRESS NOTES
[facility]    Hospitalist Progress Note             Date of Admission:  9/15/2021                   Day of hospitalization: 5    Assessment and Plan:   Andre Serrano is a 65 year old male who was sent from [facility] emergency room as a direct admission because they did not have any beds.     Patient has a past medical history significant for metastatic prostate cancer with bone metastasis (follows up with [facility]), constipation, dyslipidemia, sleep apnea.     Patient has been dealing with progressively worsening generalized abdominal pain, abdominal distention and constipation.  His oral intake has been very poor due to the symptoms.  He also gives a history of shortness of breath but not orthopnea.  He has been trying to deal with his constipation for the last many weeks with the help of his primary care physician but with little success.  He has tried fairly high dose of Senokot without any relief.  Milk of magnesia usually works for him a little bit.     He saw his oncologist yesterday and explained to him his symptoms of abdominal pain, distention and constipation.  He was supposed to get an IV infusion at that time but it was not given.  His oncologist recommended getting a CT scan which was done today.  The CT scan was done at an outside imaging facility and we do not have access to either the report or the images.  Because patient was not feeling quite well he finally decided to go to the emergency room at [facility].     Lab work done in the ER showed hyponatremia of 124 and hypercalcemia.  Creatinine was at baseline.  CT PE study was done which was negative.  The ER physician managed to talk to the radiologist who mentioned massive retroperitoneal lymphadenopathy as well as in the mesentery causing a ureteral obstruction.  We do not have the full radiology report.  Due to these findings as well as metabolic abnormalities, medical team admission was  recommended.  Because they did not have any beds patient was transferred to our facility.  He was also given 1 g of ceftriaxone due to pyuria.  Patient has a chronic Cooper catheter in place which was changed on Monday in urology office.     Metastatic prostate cancer to lymph nodes and bone: Follows with [facility]. PSA 1.4 9/14/21. Diagnosis March 2020. OSH CT scan showed progression with massive enlargement of intra-abdominal lymph nodes  -Oncology consulted - concern for neuroendocrine differentiation vs lymphoma  -Palliative care consulted - provide additional ongoing support and symptom mangement  -IR consulted for lymph node biopsy 9/17/21 - pathology pending   -Defer medical management of cancer to Onc - possible change in treatment to chemotherapy.      Complicated urinary tract infection with chronic indwelling cooper catheter, citrobacter freundii: Patient empirically started on Ceftriaxone. Cooper exchanged 9/13/21. Ceftriaxone 9/15-9/18. Started on oral ciprofloxacin 500 mg BID to complete 10 day course of abx for complicated UTI     Chronic urinary retention s/p chronic cooper; bilateral hydronephrosis secondary to lymphadenopathy due to ureteral obstruction: Patient follows with Urology as an outpatient. Cooper catheter exchanged 9/13/21  -Urology consulted - appreciate assistance - recommended bilateral ureteral stent placement/nephrostomies with primary urologist (Dr. Kolb); His renal function seems to be worsening thus urology has been re consulted to evaluate need for bilateral nephrostomy tubes, plan for stent/nephrostomies tubes today     Acute kidney injury  - suspect related to his bilateral hydronephrosis, urology has been consulted  - stop IVF today as hyponatremia worse    Severe hypercalcemia: Calcium 11.8. Ionized calcium 6.1. No clear symptoms of hypercalcemia. Known metastatic cancer. Calcium levels improve with IVF, lasix x1, and Zometa once.   -Heme/onc following - appreciate  "assistance  -Improved calcium levels - monitor     Hypophosphatemia: Phosphorus 1.6. Replacement protocol.      Hyponatremia: Peripheral edema present. Initially improved with IVF. IVF stopped 9/17/21. Lasix 40 mg IV once with good urine output but given slight increase in Cr.  started on IVF again yesterday, wth out improvement of sodium, suspect has a component of SIADH    Chronic anemia: Hemoglobin 8.6. Monitor     Constipation due to extensive lymphadenopathy: Continue medications. Patient having BMs but feels like his abdomen is distended. This is likely secondary to lymphadenopathy. Per patient no bowel movement since Tuesday, will provide enemas today, is passing flatus.  - CT abdomen shows mild constipation    Leukocytosis  - worse today, monitor, suspect reactive, possibly related to worsening lymphadenopathy and hydronephrosis.      Insomnia: Trazodone q HS     Non-severe malnutrition: RD consulted; complicates cares    Abdominal distension, pain, and lower extremity edema  - suspect related to his massive lymphadenopathy, monitor  - CT abdomen no evidence of bowel obstruction, mild constipation    FEN: Oral hydration; monitor; regular  Activity: As tolerated  DVT Prophylaxis: Enoxaparin (Lovenox) SQ  Family update: Girl friend and nephew at bedside updated     Code Status: Full Code                    Hannah Lopez MD  Text Page (7am - 6pm, M-F)               Subjective   Chief Complaint: Abdominal distension  Similar to yesterday patient feels abdominal pain is worse abdominal distention is worse as well.  He denies any nausea or vomiting or any chest pain or shortness of breath.  History otherwise minimal complaints today other than his abdominal pain, 8/10 , sharp, constant, non radiating and distention.        Objective   /75 (BP Location: Right arm)   Pulse 100   Temp 98.8  F (37.1  C) (Oral)   Resp 20   Ht 1.753 m (5' 9\")   Wt 101.7 kg (224 lb 4.8 oz)   SpO2 94%   BMI 33.12 kg/m     "   Physical Exam  General: Pt in NAD, normal appearance  HEENT: OP clear MMM, no JVD  Lungs: Clear to Auscultation Bilateral, normal breathing  without accessory muscle usage, no wheezing, rhonchi or crackles  Cardiac: +S1, S2, RRR, no MRG, 2 edema bilateral lower extremity  Abdominal: normal bowel sounds, +epigastric tenderness + distended  Skin: warm, dry, normal turgor, no rash  Psyche: A& O x3, appropriate affect             Intake/Output Summary (Last 24 hours) at 9/19/2021 1156  Last data filed at 9/18/2021 2228  Gross per 24 hour   Intake --   Output 1185 ml   Net -1185 ml           Labs and Imaging Results:      Recent Labs   Lab 09/20/21  0722 09/19/21  0712   WBC 18.7* 15.7*   HGB 9.8* 9.0*    405        Recent Labs   Lab 09/20/21  0722 09/19/21  0712   * 125*   CO2 21 25   BUN 25 20        Recent Labs   Lab 09/17/21  0652   INR 1.02      No results for input(s): CKMB in the last 168 hours.    Invalid input(s): TROPONINT     Recent Labs   Lab 09/19/21  0712 09/18/21  0758   ALBUMIN 2.3* 2.1*   * 48*   ALT 20 14   ALKPHOS 191* 147        Micro:     Radio:  CT Abdomen Pelvis w/o Contrast   Final Result   IMPRESSION:    1.  Probable mild constipation. Mild nonspecific dilation of mid small   bowel loops.   2.  Severe abdominal, retroperitoneal, and pelvic lymphadenopathy.   3.  Enlarging bilateral pleural effusions.   4.  Marked bilateral hydronephrosis related to obstruction of the   ureters by retroperitoneal lymphadenopathy.   5.  Trace ascites.   6.  Significant skeletal metastases.      TIFFANIE MORA MD            SYSTEM ID:  [id number]      XR Abdomen 1 View   Final Result   IMPRESSION: Supine views of the abdomen and pelvis were obtained. Multiple gas-filled nondilated small bowel loops in the central abdomen, otherwise nonobstructive bowel gas pattern. There is U-shaped wire-like object projected over the right upper    quadrant, indeterminate, could be external, which can be  confirmed by repeating the x-ray with removing any object overlying the patient's abdomen. Multiple radiodensities project over the pelvic bones, likely represent sclerotic osseous metastases,    better seen on 09/15/2021 exam.          CT Abdomen Retroperitoneal Biopsy   Preliminary Result   IMPRESSION: Successful CT-guided biopsy of retroperitoneal soft   tissue. Sample sent to the lab for evaluation.      XR Surgery TREE L/T 5 Min Fluoro w Stills    (Results Pending)           Medications:      Scheduled Meds:      ciprofloxacin  500 mg Oral Q12H GEN     [Held by provider] enoxaparin ANTICOAGULANT  40 mg Subcutaneous Q24H     gabapentin  100 mg Oral BID     polyethylene glycol  17 g Oral BID     predniSONE  5 mg Oral BID w/meals     sodium chloride (PF)  3 mL Intracatheter Q8H     traZODone  50 mg Oral At Bedtime     Continuous Infusions:      PRN Meds:  acetaminophen **OR** acetaminophen, albuterol, bisacodyl, HOLD MEDICATION, lidocaine 4%, lidocaine (buffered or not buffered), magnesium hydroxide, melatonin, naloxone **OR** naloxone **OR** naloxone **OR** naloxone, ondansetron **OR** ondansetron, oxyCODONE, senna-docusate, simethicone, sodium chloride (PF)

## 2021-09-20 NOTE — ANESTHESIA POSTPROCEDURE EVALUATION
Patient: Andre Serrano    Procedure(s):  CYSTOURETEROSCOPY, WITH RETROGRADE PYELOGRAM AND STENT INSERTION    Diagnosis:Acute kidney injury (ANITA) with acute tubular necrosis (ATN) (H) [N17.0]  Hydronephrosis, unspecified hydronephrosis type [N13.30]  Diagnosis Additional Information: No value filed.    Anesthesia Type:  General    Note:     Postop Pain Control: Uneventful            Sign Out: Well controlled pain   PONV: No   Neuro/Psych: Uneventful            Sign Out: Acceptable/Baseline neuro status   Airway/Respiratory: Uneventful            Sign Out: Acceptable/Baseline resp. status   CV/Hemodynamics: Uneventful            Sign Out: Acceptable CV status; No obvious hypovolemia; No obvious fluid overload   Other NRE: NONE   DID A NON-ROUTINE EVENT OCCUR? No           Last vitals:  Vitals Value Taken Time   /75 09/20/21 1436   Temp 97.4  F (36.3  C) 09/20/21 1436   Pulse 90 09/20/21 1440   Resp 19 09/20/21 1440   SpO2 99 % 09/20/21 1440   Vitals shown include unvalidated device data.    Electronically Signed By: Phil Little MD  September 20, 2021  2:41 PM

## 2021-09-21 NOTE — PROGRESS NOTES
Nephrology Progress Note  09/21/2021           ASSESSMENT AND RECOMMENDATIONS:   1 Metastatic prostate cancer - bone, LN, Lungs  2. Hypercalcemia - 2/2 #1 , s/p IV ZOmeta, IVF. Miah Ca ~10.5   3 B/l obstruction uropathy with massive hydro - d/t # 1.  Status post bilateral stenting on 9/20  4 Acute renal failure  - 2/2 #3. Noted prior Rosamaria episodes as well with incomplete recovery . Cr 1.3 at baseline. Upto 2  5 Anemia in malignancy - Hgb down to 8.5.  6 Vol overload - with aggressive IVF during this hosp as well as ROSAMARIA   7 Hyponatremia - with ROSAMARIA, poor free water clearance.      Plan -   -Encouraged to see increase urine output, improving sodium.  Will concerning that creatinine still up and higher than yesterday.  Hope to see continued increase in urine output and better creatinine tomorrow.  If creatinine continues to trend up, urology to consider percutaneous nephrostomy tube.  -Target sodium of 134-135 by tomorrow morning.  Monitor for postobstructive diuresis.  - Hold off further IVF. Lasix IV 40 mg PRN for any s/o resp distress; otherwise hold off as pt is making decent amount of urine and may have postobstructive diuresis.  - Mx of cancer/ anemia per oncology team.       José Cochran MD  [facility] - Nephrology   [phone]      Interval History :   Seen / examined.   No acute issues overnight.  Status post stenting yesterday.  Urine output has picked up significantly.  2.5 L urine yesterday with 2 pound weight loss but creatinine still trending up.  Feels bloated.  Shortness of breath is better but still shallow.  Sodium is improving    Review of Systems:   A 4 point review of systems was negative except as noted above.  Notably: fair appetite. no nausea or vomiting or diarrhea.  no confusion,  no fever or chills    Physical Exam:   I/O last 3 completed shifts:  In: 1179 [P.O.:729; I.V.:450]  Out: 2575 [Urine:2575]    GENERAL APPEARANCE: alert and no distress  EYES:  no scleral icterus, pupils  equal  PULM: lungs clear to auscultation, equal air movement, no cyanosis or clubbing  CV: regular rhythm, normal rate, no rub    no   -edema  ++   GI: soft, non tender,   NEURO: mentation intact and speech normal, no asterixis   Pride + , pink-tinged urine    Labs:   All labs reviewed by me  Electrolytes/Renal - Recent Labs   Lab Test 09/21/21  0828 09/21/21  0649 09/20/21  0722 09/19/21  0712 09/18/21  0438 09/17/21  0652 09/16/21  0644 09/16/21  0644 05/06/20  0645 05/05/20  1536   *  --  124* 125*   < > 124*   < > 126*   < > 134   POTASSIUM 4.7  --  4.6 4.6   < > 4.2   < > 4.1   < > 4.4   CHLORIDE 95  --  94 93*   < > 96   < > 95   < > 106   CO2 23  --  21 25   < > 22   < > 23   < > 17*   BUN 32*  --  25 20   < > 17   < > 16   < > 74*   CR 2.06*  --  1.77* 1.36*   < > 1.11   < > 1.15   < > 4.06*   *  --  90 97   < > 99   < > 85   < > 111*   JAXSON 9.3  --  9.1 9.8   < > 10.4*   < > 10.5*   < > 7.1*   MAG  --   --   --   --   --  2.1  --  2.1  --  2.6*   PHOS  --  3.6 2.9 2.1*   < > 1.4*   < > 1.6*   < >  --     < > = values in this interval not displayed.       CBC -   Recent Labs   Lab Test 09/21/21  0828 09/20/21  0722 09/19/21  0712   WBC 21.2* 18.7* 15.7*   HGB 8.5* 9.8* 9.0*    387 405       LFTs -   Recent Labs   Lab Test 09/19/21  0712 09/18/21  0758 09/17/21  0652   ALKPHOS 191* 147 164*   BILITOTAL 0.4 0.6 0.2   ALT 20 14 17   * 48* 48*   PROTTOTAL 6.9 6.4* 6.6*   ALBUMIN 2.3* 2.1* 2.1*       Iron Panel - No lab results found.      Current Medications:    ceFAZolin  2 g Intravenous See Admin Instructions     ciprofloxacin  500 mg Oral Q12H [facility]     [Held by provider] enoxaparin ANTICOAGULANT  40 mg Subcutaneous Q24H     gabapentin  100 mg Oral BID     polyethylene glycol  17 g Oral BID     predniSONE  5 mg Oral BID w/meals     sodium chloride (PF)  3 mL Intracatheter Q8H     sodium chloride (PF)  3 mL Intracatheter Q8H     traZODone  50 mg Oral At Bedtime       lactated ringers        José Cochran MD

## 2021-09-21 NOTE — PROGRESS NOTES
"    [facility]  Palliative Care Progress Note  Text Page     Assessment & Plan   Andre Serrano is a 65 year old male who was admitted on 9/15/2021.   Consulted by Dr. Pineda to assist with symptom management, goals of care, and development of plan of care.     Recommendations:  1. Goals of Care- Full Code-restorative cares  Hospitalization goals discussed Discussed his overall health care goals. Discussed and reviewed his understand of a health care directive and agent. He stated he was going to update this. Offered to have someone help with this while in the hospital. He stated he would like that. Discussed pursuing chemotherapies as directed by oncology. He stated \"at this time that is my plan unless they tell me I'm dying from this\" Discussed hospitalizations and TCU, both of which he reports aligns with his goals.   Decisional Capacity- Intact. Patient does not have an advance directive. Per  informed consent policy next of kin should be involved if patient becomes unable.  -JULIET is mother who lives in [place]- she 92, he did not have contact information today  POLST will need completed should his goals change     2. Pain, abdominal pain   Patient's opioid use in past 24 hours: 0 = 0mg Daily Morphine Equivalent  [place] Board of Pharmacy Data Base Reviewed:    YES; As expected, no concern for misuse/abuse of controlled medications based on this report.  -Added gabapentin 100 mg BID, if still having pain and tolerating gabapentin would increase to 200 mg BID tomorrow     3. Weakness  Appreciate the input of therapies for discharge recommendations, patient lives alone with limited support     4. Decreased appetite, nausea  -encourage small frequent meals  -Zofran 4 mg every 6 hours PRN     5. Spiritual Care  Oriented to Spiritual Health as part of Palliative Care team.  Spiritual Background: declined     6. Care Planning  Appreciate Care of [facility] [place] for discharge " planning as able.    Medical Decision Making and Goals of Care:  Discussed on September 21, 2021 with Debbi AZAR CNP: see discussion in goals of care section    Debbi AZAR CNP  Pain Management and Palliative Care  [facility]  Pgr: [phone]    Time Spent on this Encounter   Total unit/floor time 25 minutes, time consisted of the following, examination of the patient, reviewing the record and completing documentation. >50% of time spent in counseling and coordination of care, Bedside Nurse Angelica and Hospitalist Dr Lopez.  Time spend counseling with patient consisted of the following topics, goals of care, education about diagnosis and symptom management.      Review of Systems    CONSTITUTIONAL: NEGATIVE for fever, chills, change in weight  ENT/MOUTH: NEGATIVE for ear, mouth and throat problems  RESP: NEGATIVE for significant cough or SOB  CV: NEGATIVE for chest pain, palpitations or peripheral edema    Physical Exam   Temp:  [97.4  F (36.3  C)-99.7  F (37.6  C)] 98.2  F (36.8  C)  Pulse:  [] 94  Resp:  [17-29] 18  BP: (105-142)/(70-84) 122/75  SpO2:  [92 %-99 %] 95 %  222 lbs 0 oz  Exam:  GENERAL APPEARANCE:  Alert, cooperative  RESP:  respiratory effort and palpation of chest normal, no respiratory distress  CV:  Palpation and auscultation of heart done , regular rate and rhythm, no murmur, rub, or gallop  ABDOMEN:  distended  PSYCH:  oriented X 3, affect and mood normal    Medications     lactated ringers         ceFAZolin  2 g Intravenous See Admin Instructions     ciprofloxacin  500 mg Oral Q12H [facility]     [Held by provider] enoxaparin ANTICOAGULANT  40 mg Subcutaneous Q24H     gabapentin  100 mg Oral BID     polyethylene glycol  17 g Oral BID     predniSONE  5 mg Oral BID w/meals     sodium chloride (PF)  3 mL Intracatheter Q8H     sodium chloride (PF)  3 mL Intracatheter Q8H     traZODone  50 mg Oral At Bedtime       Data   Results for orders placed or performed  during the hospital encounter of 09/15/21 (from the past 24 hour(s))   Phosphorus   Result Value Ref Range    Phosphorus 3.6 2.5 - 4.5 mg/dL   CBC with platelets   Result Value Ref Range    WBC Count 21.2 (H) 4.0 - 11.0 10e3/uL    RBC Count 2.83 (L) 4.40 - 5.90 10e6/uL    Hemoglobin 8.5 (L) 13.3 - 17.7 g/dL    Hematocrit 26.6 (L) 40.0 - 53.0 %    MCV 94 78 - 100 fL    MCH 30.0 26.5 - 33.0 pg    MCHC 32.0 31.5 - 36.5 g/dL    RDW 13.9 10.0 - 15.0 %    Platelet Count 421 150 - 450 10e3/uL   Basic metabolic panel   Result Value Ref Range    Sodium 126 (L) 133 - 144 mmol/L    Potassium 4.7 3.4 - 5.3 mmol/L    Chloride 95 94 - 109 mmol/L    Carbon Dioxide (CO2) 23 20 - 32 mmol/L    Anion Gap 8 3 - 14 mmol/L    Urea Nitrogen 32 (H) 7 - 30 mg/dL    Creatinine 2.06 (H) 0.66 - 1.25 mg/dL    Calcium 9.3 8.5 - 10.1 mg/dL    Glucose 103 (H) 70 - 99 mg/dL    GFR Estimate 32 (L) >60 mL/min/1.73m2   WBC and differential    Narrative    The following orders were created for panel order WBC and differential.  Procedure                               Abnormality         Status                     ---------                               -----------         ------                     WBC and Differential[[id number]]                             In process                   Please view results for these tests on the individual orders.   Lab Blood Morphology Pathologist Review    Narrative    The following orders were created for panel order Lab Blood Morphology Pathologist Review.  Procedure                               Abnormality         Status                     ---------                               -----------         ------                     Bld morphology pathology...[[id number]]                                                 CBC with platelets and d...[[id number]]                      In process                 Reticulocyte count[[id number]]           Abnormal            Final result               Morphology Tracking[[id number]]                               In process                   Please view results for these tests on the individual orders.   Reticulocyte count   Result Value Ref Range    % Reticulocyte 2.1 (H) 0.5 - 2.0 %    Absolute Reticulocyte 0.060 0.025 - 0.095 10e6/uL   CBC with platelets differential *Canceled*    Narrative    The following orders were created for panel order CBC with platelets differential.  Procedure                               Abnormality         Status                     ---------                               -----------         ------                       Please view results for these tests on the individual orders.

## 2021-09-21 NOTE — PLAN OF CARE
Pertinent assessments:A&O. Neuros intact. RA, denies SOB. Up Ax1 with walker. C/o abdominal pain and tightness, relieved with oxycodone.  Pride with yellow urine. Edema noted in lower legs +3.     Major Shift Events: Uneventful.     Treatment Plan: Lab monitoring, pain management, emotional support, encourage ambulation, Cipro.

## 2021-09-21 NOTE — ACP (ADVANCE CARE PLANNING)
SPIRITUAL HEALTH SERVICES Progress Note   RH Advance Directive Education    Responded to a consult order for Advance Care Planning (ACP) education for Andre SANCHEZ Nayjessica.      Negrito was visiting with his friends Tani and Marianna.  Provided ACP materials.    Plan: Will follow up on 9/23/2021 for ACP education.    Doroteo Castrejon M.Div., [facility]  Staff   Phone [phone]

## 2021-09-21 NOTE — PLAN OF CARE
End of Shift Summary  For vital signs and complete assessments, please see documentation flowsheets.     Pertinent assessments: Lung sounds have expiratory wheezes. Pt. Denies SOB. Pt. On 1L of oxygen. Edema noted in lower legs +2. Abdomen is distended.   Major Shift Events: pt. Returned from surgery  Treatment Plan: Continue pain management, emotional support, and encouraging ambulating

## 2021-09-21 NOTE — PLAN OF CARE
"Pertinent assessments: VSS, on capno. A/O. Denies SOB. Up Ax1 with walker. Denies pain. Pride with yellow urine. Edema noted in lower legs +3. Abdomen is distended, reports improvement after stent placement.  Major Shift Events: Cipro now 2x/day. Two visitors in room during afternoon, pt reports they are both \"friends\".  Treatment Plan: Continue pain management, emotional support, and encouraging ambulating.   Bedside Nurse: Celine Feng RN   "

## 2021-09-21 NOTE — PROGRESS NOTES
[facility]    Hematology / Oncology f/u     Date of Admission:  9/15/2021    Assessment & Plan   Andre Serrano is a 65 year old male who was admitted on 9/15/2021. I was asked to see the patient for history of metastatic prostate cancer.     Assessment:  Plan:  1.  Metastatic prostate cancer with second metastasis to lymph nodes and bones:  -Initial diagnosis in March 2020: Baseline PSA > 700:   -Current treatment is ADT plus abiraterone  -PSA from 9/14/2021 was 1.4  -However CT scan findings show clear progression with massive enlargement of intra-abdominal lymphadenopathy.   - Discussed with Dr. Nguyen, the discordant findings between PSA and CT scan is worrisome for either neuroendocrine differentiation.  Differential diagnosis include possibility of a different histology such as lymphoma.  ---Patient completed retroperitoneal lymph node biopsy on 9/17/2021 results pending.   -Called pathology today, biopsy results still pending.   Plan   -Await pathology results to determine next steps in treatment.   -Likely would require change in treatment to chemotherapy.    2.  Bilateral hydronephrosis/ ANITA   -Patient was seen by urology.  -Worsening creatinine over the weekend.  Had bilateral ureteral stents placed yesterday.   Creatinine today 2.06.   -Nephrology consulted yesterday    Plan   -Appreciate input from nephrology.      3.  Hypercalcemia  - IV hydration  -Had 1 dose of Zometa on 9/16/2021  -Calcium is 9.3 today.   Plan   -IV rehydration, to continue  -However, adjust IV fluids as per nephrology recommendations.     4.  Anemia  -Likely secondary to underlying metastatic prostate cancer    Plan   -We will transfuse with hemoglobin less than 7.0     5.  Constipation  -Likely secondary to hypercalcemia.  -Hypercalcemia corrected.  -Had a bowel movement with enema    Plan   -Continue with aggressive bowel regimen  -Please give milk of magnesia, Senokot daily till has good bowel movements  -Enema as  needed.       Christo Jasso MD   [facility]  Office:  [phone]    Code Status    Full Code    Reason for Consult   Reason for consult: History of metastatic prostate cancer    Primary Care Physician   Chacho Galan    Chief Complaint     Abdominal pain.       History of Present Illness    Andre Serrano is a 65 year old male who presents with abdominal pain.  Patient has past medical history significant for metastatic prostate cancer, he follows with Dr. Dariel Nguyen.  He had previous history of elevated PSA.  Diagnosis was confirmed in March 2020 when he presented with rising PSA as well as metastatic intra-abdominal lymphadenopathy, and bone metastasis.   Lymph node biopsy from 2/23/2020 confirmed metastatic adenocarcinoma.  He was initially started on ADT, and abiraterone was added in May 2020 which is his current treatment.  He has had a good PSA response going down from an initial value of 713 in March 2020 to less than 0.5 in March 2021.   Patient reported that approximately 3 months ago he started experiencing new symptoms of abdominal pain.  He describes this as distention.  He had difficulty with bowels experiencing more constipation.  Symptoms were progressive.  He met with Dr. Nguyen recently, laboratory studies show a PSA of 1.4 however his calcium was elevated and because of his abdominal symptoms a CT scan of abdomen pelvis was requested which patient completed on 9/14/2021.  The CT scan showed clear evidence of progression with extensive worsening of intra-abdominal lymphadenopathy new lung metastasis.     CT scan abdomen/pelvis dose at [place] [facility] : Large conglomerate adenopathy is identified diroughout the retroperitoneum with regions appearing fibrotic. For example, large adenopadiy at die retroperitoneum encasing the abdominal aorta and R7C in transverse plane is 13.2 X 6.1 cm, series 3 image 39. The crariiocaudal extent is approximately 15.6 cm on coronal series 4 image  33. This  also encases portions of the renal vessels. The adenopathy contacts and surrounds the mid ureters bilaterally. This adenopadiy also extends into the upper and bilateral pelvis. An example at the left pelvic sidewall posterior to die external iliac vessels is 2.2 x 1.5 cm. An example on die right side adjacent to the external iliac vessels is 3.5 x 2.5 cm. There are other examples. There are multiple enlarged lymph nodes also seen widiiri the mesentery that appears contiguous with die retroperitoneal adenopathy, [facility] image 105.    Patient was supposed to go back to see Dr. Nguyen however he ended up in the ER due to worsening abdominal pain there were no beds in [facility] and he was transferred over to [facility].       Oncologic chronology    Mr. Andre Serrano is a 65-year-old pleasant man with a history of elevated PSA in 09/2019. He had multiple  urinary symptoms including hesitancy, frequency and decrease in urine flow for a while, but worsened  significantly over few days. He was supposed to have a followup September, but lost to followup and he  came to the outpatient office with increasing abdominal pain and left flank pain. He had constipation a few days,  but he was not eating much and was found to have creatinine of 9 and was admitted to the hospital on 03/20/2020.  A Pride catheter was placed and felt better. CT scan of abdomen without contrast was done and it showed moderate  bilateral hydroureteronephrosis with the hydroureter extending to the distal aspect without evidence of urolithiasis. There is moderate asymmetric left  periureteral and perinepliric stranding. Mild nodular enlargement of the prostate and dense in the base of the  urinary bladder with regular layering hyperdensity, inseparable from the nodular protrusion of the prostate. There  is lymphadenopathy, diffuse retroperitoneal and bilateral iliac chain, left periaortic lymph nodes at the level  of the  left kidney 2.1 x 2.8 cm. Left external iliac lymph node 1.9 x 3.1 cm. Multiple lymph nodes adjacent to the  inferior mesenteric vasculature measuring 1.4 x 1.6 cm. Numerous bilateral internal iliac lymphadenopathy.  Multiple faint sclerotic lesions in the visualized spine and pelvis, most prominent in the subtrochanteric right  proximal femur, intertrochanteric left proximal femur consistent with metastatic disease.    Biopsy of the pelvic lymph node showed adenocarcinoma consistent with prostate cancer.  Bone scan was done on March 23 which showed widespread metastatic disease including humeri femurs sacrum and ribs in addition to majority of the spine.    He was given first LHRH analog Firmagon at urology office on March 31, 2020. Abiraterone prednisone regimen was started in May 2020 after approval of the regimen.    PSA in July was down to 2.2 normal. Decreased further in February to 0.5 and 0.4 in April 2021.    Interval history  9/17: Patient continues to remain very uncomfortable with abdominal bloating, has not had a good bowel movement for many weeks.  Becomes nauseous when she tries to eat.     9/20: Continues to be very uncomfortable, with abdominal bloating distention.  Has gained approximately 6 pounds in water weight since yesterday.  Continues to be very swollen in his lower extremities.  Have difficulty breathing nauseous.     9/21: Had bilateral ureteral stent placed yesterday, feels significantly better today.  Continues to have abdominal distention.     Past Medical History   I have reviewed this patient's medical history and updated it with pertinent information if needed.   Past Medical History:   Diagnosis Date     Cancer (H)      Heavy alcohol use      Hypercholesteremia      Lower urinary tract symptoms (LUTS)      Sleep apnea     uses cpap machine at home       Past Surgical History   I have reviewed this patient's surgical history and updated it with pertinent information if  needed.  Past Surgical History:   Procedure Laterality Date     APPENDECTOMY  1972     HERNIORRHAPHY INGUINAL INFANT         Prior to Admission Medications   Prior to Admission Medications   Prescriptions Last Dose Informant Patient Reported? Taking?   Psyllium 58.12 % PACK 9/15/2021 at Unknown time Self Yes Yes   Sig: Take 1 packet by mouth 3 times daily   SENEXON-S 8.6-50 MG tablet 9/15/2021 at Unknown time  No Yes   Sig: TAKE TWO TABLETS BY MOUTH TWICE A DAY   abiraterone (ZYTIGA) 250 MG tablet 9/15/2021 at 10:30 am  Yes Yes   Sig: Take 1,000 mg by mouth daily   leuprolide (ELIGARD) 45 MG kit More than a month at 5 months ago  Yes Yes   Si mg every 6 months    ondansetron (ZOFRAN-ODT) 4 MG ODT tab Past Month at Unknown time  Yes Yes   Sig: Take 4 mg by mouth daily as needed    polyethylene glycol (MIRALAX) 17 g packet 9/15/2021 at am  Yes Yes   Sig: Take 1 packet by mouth daily   predniSONE (DELTASONE) 5 MG tablet 9/15/2021 at Unknown time  Yes Yes   Sig: Take 5 mg by mouth 2 times daily    traZODone (DESYREL) 50 MG tablet 9/15/2021 at PM  No Yes   Sig: TAKE 1-2 TABLETS AT BEDTIME      Facility-Administered Medications: None     Allergies   No Known Allergies    Social History   I have reviewed this patient's social history and updated it with pertinent information if needed. Andre Serrano  reports that he is a non-smoker but has been exposed to tobacco smoke. He has never used smokeless tobacco. He reports current alcohol use of about 16.7 standard drinks of alcohol per week. He reports current drug use. Drug: Marijuana.    Family History   I have reviewed this patient's family history and updated it with pertinent information if needed.   Family History   Problem Relation Age of Onset     Prostate Cancer Father      Brain Cancer Father      Uterine Cancer Mother      Coronary Artery Disease Maternal Grandfather      Diabetes No family hx of        Review of Systems   Review of system positive for  abdominal pain constipation swelling in lower extremities.  Decreased appetite.     Physical Exam   Temp: 98.2  F (36.8  C) Temp src: Oral BP: 122/75 Pulse: 94   Resp: 18 SpO2: 95 % O2 Device: Nasal cannula Oxygen Delivery: 2 LPM  Vital Signs with Ranges  Temp:  [97.4  F (36.3  C)-99.7  F (37.6  C)] 98.2  F (36.8  C)  Pulse:  [] 94  Resp:  [17-29] 18  BP: (105-142)/(70-84) 122/75  SpO2:  [92 %-99 %] 95 %  222 lbs 0 oz    Constitutional: Awake, alert, cooperative, no apparent distress. ECOG PS 2  GI: Soft, there is abdominal wall edema, abdomen is distended, firm.  There is mild diffuse tenderness.   lThere is bilateral lower extremity edema    Data   Treatment plan reviewed with patient

## 2021-09-21 NOTE — PLAN OF CARE
End of Shift Summary  For vital signs and complete assessments, please see documentation flowsheets.     Pertinent assessments: VSS, on capno. A/O. Denies SOB. Up Ax1 with walker. Denies pain. Pride with pink tinged urine. Edema noted in lower legs +2. Abdomen is distended, reports improvement after stent placement.  Major Shift Events: uneventful  Treatment Plan: Continue pain management, emotional support, and encouraging ambulating

## 2021-09-21 NOTE — PROGRESS NOTES
[facility]rology Progress Note          Assessment and Plan:     Assessment:    POD 1 Cystoscopy, bilateral retrograde pyelograms, interpretation of fluoroscopic images, placement of 7 x 24 double-J ureteral stent    Metastatic prostate cancer    Hydronephrosis     Hyponatremia      Plan:   -Continue with indwelling Pride catheter (chronic) and indwelling ureteral stents.  -Continue to monitor creatinine.  If not improving, will need to consider possible nephrostomy tubes.  -We discussed possible side effects with an indwelling ureteral stent such as urgency and frequency of urination, dysuria, hematuria, symptoms of urine reflux, and some achiness in the side. Indwelling ureteral stents need to be exchanged every three months or removed by three months. Patient should arrange this with this primary urologist Dr. Paris.   -Discussed the possible need for nephrostomy tube placement if he is unable to tolerate stents or in the case of stent failure.  These also need to be exchanged every 3 months.    Leidy Rojas PA-C   [facility]  [phone]               Interval History:     Doing well.  Patient feels bloated and has edema.  Feels better after stent placement.  Denies any concerns with stent.  Chronic Pride catheter draining peach colored urine.  Afebrile with no tachycardia.  Creatinine pending this morning.Denies N/V/F/C/SOB/CP.              Review of Systems:     The 5 point Review of Systems is negative other than noted in the HPI             Medications:     Current Facility-Administered Medications Ordered in Epic   Medication Dose Route Frequency Last Rate Last Admin     acetaminophen (TYLENOL) tablet 650 mg  650 mg Oral Q6H PRN   650 mg at 09/17/21 0906    Or     acetaminophen (TYLENOL) Suppository 650 mg  650 mg Rectal Q6H PRN         albuterol (PROAIR HFA/PROVENTIL HFA/VENTOLIN HFA) 108 (90 Base) MCG/ACT inhaler 2 puff  2 puff Inhalation 4x Daily PRN         bisacodyl  (DULCOLAX) Suppository 10 mg  10 mg Rectal Daily PRN         ceFAZolin Sodium (ANCEF) injection 2 g  2 g Intravenous See Admin Instructions         ciprofloxacin (CIPRO) tablet 500 mg  500 mg Oral Q12H GEN   500 mg at 09/21/21 0806     [Held by provider] enoxaparin ANTICOAGULANT (LOVENOX) injection 40 mg  40 mg Subcutaneous Q24H   40 mg at 09/19/21 0759     gabapentin (NEURONTIN) capsule 100 mg  100 mg Oral BID   100 mg at 09/21/21 0806     lactated ringers infusion   Intravenous Continuous   New Bag at 09/20/21 1330     lidocaine (LMX4) cream   Topical Q1H PRN         lidocaine (LMX4) cream   Topical Q1H PRN         lidocaine 1 % 0.1-1 mL  0.1-1 mL Other Q1H PRN         lidocaine 1 % 0.1-1 mL  0.1-1 mL Other Q1H PRN         magnesium hydroxide (MILK OF MAGNESIA) suspension 15-30 mL  15-30 mL Oral Daily PRN   30 mL at 09/19/21 0843     melatonin tablet 3 mg  3 mg Oral At Bedtime PRN         naloxone (NARCAN) injection 0.2 mg  0.2 mg Intravenous Q2 Min PRN        Or     naloxone (NARCAN) injection 0.4 mg  0.4 mg Intravenous Q2 Min PRN        Or     naloxone (NARCAN) injection 0.2 mg  0.2 mg Intramuscular Q2 Min PRN        Or     naloxone (NARCAN) injection 0.4 mg  0.4 mg Intramuscular Q2 Min PRN         ondansetron (ZOFRAN-ODT) ODT tab 4 mg  4 mg Oral Q6H PRN        Or     ondansetron (ZOFRAN) injection 4 mg  4 mg Intravenous Q6H PRN   4 mg at 09/18/21 1817     oxyCODONE (ROXICODONE) tablet 5-10 mg  5-10 mg Oral Q4H PRN   10 mg at 09/20/21 1804     polyethylene glycol (MIRALAX) Packet 17 g  17 g Oral BID   17 g at 09/21/21 0806     predniSONE (DELTASONE) tablet 5 mg  5 mg Oral BID w/meals   5 mg at 09/21/21 0806     senna-docusate (SENOKOT-S/PERICOLACE) 8.6-50 MG per tablet 2 tablet  2 tablet Oral At Bedtime PRN         simethicone (MYLICON) chewable tablet 160 mg  160 mg Oral 4x Daily PRN         sodium chloride (PF) 0.9% PF flush 3 mL  3 mL Intracatheter Q8H   3 mL at 09/20/21 2142     sodium chloride (PF) 0.9% PF  flush 3 mL  3 mL Intracatheter q1 min prn         sodium chloride (PF) 0.9% PF flush 3 mL  3 mL Intracatheter Q8H   3 mL at 09/21/21 0806     sodium chloride (PF) 0.9% PF flush 3 mL  3 mL Intracatheter q1 min prn         traZODone (DESYREL) tablet 50 mg  50 mg Oral At Bedtime   50 mg at 09/20/21 5685     No current [facility]-ordered outpatient medications on file.                  Physical Exam:   Vitals were reviewed  Patient Vitals for the past 8 hrs:   BP Temp Temp src Pulse Resp SpO2 Weight   09/21/21 0724 122/75 98.2  F (36.8  C) Oral 94 18 95 % --   09/21/21 0633 -- -- -- -- -- 92 % --   09/21/21 0602 -- -- -- -- -- -- 100.7 kg (222 lb)     GEN: NAD, lying in bed  EYES: EOMI  MOUTH: MMM  NECK: Supple  RESP: Unlabored breathing  CARDIAC: 2-3+ bilateral lower extremity edema  ABD: distended  NEURO: AAO  URO: Pride in place draining peach urine.           Data:     Lab Results   Component Value Date    NTBNPI 902 (H) 09/15/2021     Lab Results   Component Value Date    WBC 21.2 (H) 09/21/2021    WBC 18.7 (H) 09/20/2021    WBC 15.7 (H) 09/19/2021    HGB 8.5 (L) 09/21/2021    HGB 9.8 (L) 09/20/2021    HGB 9.0 (L) 09/19/2021    HCT 26.6 (L) 09/21/2021    HCT 30.5 (L) 09/20/2021    HCT 28.5 (L) 09/19/2021    MCV 94 09/21/2021    MCV 96 09/20/2021    MCV 94 09/19/2021     09/21/2021     09/20/2021     09/19/2021     Lab Results   Component Value Date    INR 1.02 09/17/2021    INR 1.13 03/23/2020

## 2021-09-21 NOTE — PROGRESS NOTES
[facility]    Hospitalist Progress Note             Date of Admission:  9/15/2021                   Day of hospitalization: 6    Assessment and Plan:   Andre Serrano is a 65 year old male who was sent from [facility] emergency room as a direct admission because they did not have any beds.     Patient has a past medical history significant for metastatic prostate cancer with bone metastasis (follows up [facility]), constipation, dyslipidemia, sleep apnea.     Patient has been dealing with progressively worsening generalized abdominal pain, abdominal distention and constipation.  His oral intake has been very poor due to the symptoms.  He also gives a history of shortness of breath but not orthopnea.  He has been trying to deal with his constipation for the last many weeks with the help of his primary care physician but with little success.  He has tried fairly high dose of Senokot without any relief.  Milk of magnesia usually works for him a little bit.     He saw his oncologist yesterday and explained to him his symptoms of abdominal pain, distention and constipation.  He was supposed to get an IV infusion at that time but it was not given.  His oncologist recommended getting a CT scan which was done today.  The CT scan was done at an outside imaging facility and we do not have access to either the report or the images.  Because patient was not feeling quite well he finally decided to go to the emergency room at [facility].     Lab work done in the ER showed hyponatremia of 124 and hypercalcemia.  Creatinine was at baseline.  CT PE study was done which was negative.  The ER physician managed to talk to the radiologist who mentioned massive retroperitoneal lymphadenopathy as well as in the mesentery causing a ureteral obstruction.  We do not have the full radiology report.  Due to these findings as well as metabolic abnormalities, medical team admission was  recommended.  Because they did not have any beds patient was transferred to our facility.  He was also given 1 g of ceftriaxone due to pyuria.  Patient has a chronic Cooper catheter in place which was changed on Monday in urology office.     Metastatic prostate cancer to lymph nodes and bone: Follows with [facility]. PSA 1.4 9/14/21. Diagnosis March 2020. OSH CT scan showed progression with massive enlargement of intra-abdominal lymph nodes  -Oncology consulted - concern for neuroendocrine differentiation vs lymphoma  -Palliative care consulted - provide additional ongoing support and symptom mangement  -IR consulted for lymph node biopsy 9/17/21 - pathology pending   -Defer medical management of cancer to Onc - possible change in treatment to chemotherapy.      Complicated urinary tract infection with chronic indwelling cooper catheter, citrobacter freundii: Patient empirically started on Ceftriaxone. Cooper exchanged 9/13/21. Ceftriaxone 9/15-9/18. Started on oral ciprofloxacin 500 mg BID (9/18) will continue until obstruction issues improved     Chronic urinary retention s/p chronic cooper; bilateral hydronephrosis secondary to lymphadenopathy due to ureteral obstruction: Patient follows with Urology as an outpatient. Cooper catheter exchanged 9/13/21  -Urology consulted - appreciate assistance   - he is status post stenting 9/20, will monitor renal function closely, may need nephrostomy tubes if UOP and renal function worsens    Acute kidney injury  - suspect related to his bilateral hydronephrosis, urology has been consulted  - IVF stopped  - may need eventual diuresis. We will monitor his renal function closely need stabilization of renal function prior to initiating diuresis currently clinically stable    Severe hypercalcemia: Calcium 11.8. Ionized calcium 6.1. No clear symptoms of hypercalcemia. Known metastatic cancer. Calcium levels improve with IVF, lasix x1, and Zometa once.   -Heme/onc following -  appreciate assistance  -Improved calcium levels - monitor     Hypophosphatemia: Phosphorus 1.6. Replacement protocol.      Hyponatremia: Peripheral edema present. Initially improved with IVF. IVF stopped 9/17/21. Lasix 40 mg IV once with good urine output but given slight increase in Cr.  Multifactorial possibly related to his obstruction resulting in SIADH. We will monitor closely with postobstructive diuresis    Chronic anemia: Hemoglobin 8.6. Monitor     Constipation due to extensive lymphadenopathy: Continue medications. Patient having BMs but feels like his abdomen is distended. This is likely secondary to lymphadenopathy. Per patient no bowel movement since Tuesday, will provide enemas today, is passing flatus.  - CT abdomen shows mild constipation    Leukocytosis  - worse today, monitor, suspect reactive, possibly related to worsening lymphadenopathy and hydronephrosis. Patient is also on steroid therapy  -Continue current antibiotic regimen of ciprofloxacin, differential ordered     Insomnia: Trazodone q HS     Non-severe malnutrition: RD consulted; complicates cares    Abdominal distension, pain, and lower extremity edema  - suspect related to his massive lymphadenopathy, monitor  - CT abdomen no evidence of bowel obstruction, mild constipation  -May consider diuresis pending renal function    FEN: Oral hydration; monitor; regular  Activity: As tolerated  DVT Prophylaxis: Enoxaparin (Lovenox) SQ  Family update: Girl friend and nephew at bedside updated     Code Status: Full Code                    Hannah Lopez MD  Text Page (7am - 6pm, M-F)               Subjective   Chief Complaint: Abdominal distension  Subjective: States he feels better today abdominal pain improved he has dyspnea on exertion which is not worsening. States his lower extremity edema is about the same as yesterday not worsening. Otherwise no fevers chills chest pain nausea or vomiting tolerating his diet.        Objective   /75 (BP  "Location: Right arm)   Pulse 94   Temp 98.2  F (36.8  C) (Oral)   Resp 18   Ht 1.753 m (5' 9\")   Wt 100.7 kg (222 lb)   SpO2 95%   BMI 32.78 kg/m       Physical Exam  General: Pt in NAD, normal appearance  HEENT: OP clear MMM, no JVD  Lungs: Clear to Auscultation Bilateral, normal breathing  without accessory muscle usage, no wheezing, rhonchi or crackles  Cardiac: +S1, S2, RRR, no MRG, 2 edema bilateral lower extremity  Abdominal: normal bowel sounds, nontender + distended  Skin: warm, dry, normal turgor, no rash  Psyche: A& O x3, appropriate affect             Intake/Output Summary (Last 24 hours) at 9/19/2021 1156  Last data filed at 9/18/2021 2228  Gross per 24 hour   Intake --   Output 1185 ml   Net -1185 ml           Labs and Imaging Results:      Recent Labs   Lab 09/21/21  0828 09/20/21  0722   WBC 21.2* 18.7*   HGB 8.5* 9.8*    387        Recent Labs   Lab 09/21/21  0828 09/20/21  0722   * 124*   CO2 23 21   BUN 32* 25        Recent Labs   Lab 09/17/21  0652   INR 1.02      No results for input(s): CKMB in the last 168 hours.    Invalid input(s): TROPONINT     Recent Labs   Lab 09/19/21  0712 09/18/21  0758   ALBUMIN 2.3* 2.1*   * 48*   ALT 20 14   ALKPHOS 191* 147        Micro:     Radio:  XR Surgery TREE L/T 5 Min Fluoro w Stills   Final Result      CT Abdomen Pelvis w/o Contrast   Final Result   IMPRESSION:    1.  Probable mild constipation. Mild nonspecific dilation of mid small   bowel loops.   2.  Severe abdominal, retroperitoneal, and pelvic lymphadenopathy.   3.  Enlarging bilateral pleural effusions.   4.  Marked bilateral hydronephrosis related to obstruction of the   ureters by retroperitoneal lymphadenopathy.   5.  Trace ascites.   6.  Significant skeletal metastases.      TIFFANIE MORA MD            SYSTEM ID:  [id number]      XR Abdomen 1 View   Final Result   IMPRESSION: Supine views of the abdomen and pelvis were obtained. Multiple gas-filled nondilated small " bowel loops in the central abdomen, otherwise nonobstructive bowel gas pattern. There is U-shaped wire-like object projected over the right upper    quadrant, indeterminate, could be external, which can be confirmed by repeating the x-ray with removing any object overlying the patient's abdomen. Multiple radiodensities project over the pelvic bones, likely represent sclerotic osseous metastases,    better seen on 09/15/2021 exam.          CT Abdomen Retroperitoneal Biopsy   Final Result   IMPRESSION: Successful CT-guided biopsy of retroperitoneal soft   tissue. Sample sent to the lab for evaluation.      ZAK VILLATORO MD            SYSTEM ID:  [id number]              Medications:      Scheduled Meds:      ceFAZolin  2 g Intravenous See Admin Instructions     ciprofloxacin  500 mg Oral Q12H GEN     [Held by provider] enoxaparin ANTICOAGULANT  40 mg Subcutaneous Q24H     gabapentin  100 mg Oral BID     polyethylene glycol  17 g Oral BID     predniSONE  5 mg Oral BID w/meals     sodium chloride (PF)  3 mL Intracatheter Q8H     sodium chloride (PF)  3 mL Intracatheter Q8H     traZODone  50 mg Oral At Bedtime     Continuous Infusions:      lactated ringers       PRN Meds:  acetaminophen **OR** acetaminophen, albuterol, bisacodyl, lidocaine 4%, lidocaine 4%, lidocaine (buffered or not buffered), lidocaine (buffered or not buffered), magnesium hydroxide, melatonin, naloxone **OR** naloxone **OR** naloxone **OR** naloxone, ondansetron **OR** ondansetron, oxyCODONE, senna-docusate, simethicone, sodium chloride (PF), sodium chloride (PF)

## 2021-09-21 NOTE — CONSULTS
Care Management Initial Consult    General Information  Assessment completed with: Patient,    Type of CM/SW Visit: Initial Assessment    Primary Care Provider verified and updated as needed: Yes   Readmission within the last 30 days: no previous admission in last 30 days      Reason for Consult: care coordination/care conference, discharge planning      Communication Assessment  Patient's communication style: spoken language (English or Bilingual)    Hearing Difficulty or Deaf: no   Wear Glasses or Blind: no    Cognitive  Cognitive/Neuro/Behavioral: WDL  Level of Consciousness: alert  Arousal Level: opens eyes spontaneously  Orientation: oriented x 4  Mood/Behavior: calm, cooperative  Best Language: 0 - No aphasia  Speech: clear, logical    Living Environment:   People in home: alone     Current living Arrangements: apartment      Able to return to prior arrangements: yes       Family/Social Support:  Care provided by: self  Provides care for: no one     Other (specify) (friends)          Description of Support System: Supportive         Current Resources:   Patient receiving home care services: No     Community Resources: None  Equipment currently used at home: none  Supplies currently used at home: None    Employment/Financial:  Employment Status: retired        Financial Concerns: No concerns identified           Lifestyle & Psychosocial Needs:  Social Determinants of Health     Tobacco Use: Medium Risk     Smoking Tobacco Use: Passive Smoke Exposure - Never Smoker     Smokeless Tobacco Use: Never Used   Alcohol Use:      Frequency of Alcohol Consumption:      Average Number of Drinks:      Frequency of Binge Drinking:    Financial Resource Strain:      Difficulty of Paying Living Expenses:    Food Insecurity:      Worried About Running Out of Food in the Last Year:      Ran Out of Food in the Last Year:    Transportation Needs:      Lack of Transportation (Medical):      Lack of Transportation (Non-Medical):     Physical Activity:      Days of Exercise per Week:      Minutes of Exercise per Session:    Stress:      Feeling of Stress :    Social Connections:      Frequency of Communication with Friends and Family:      Frequency of Social Gatherings with Friends and Family:      Attends Christian Services:      Active Member of Clubs or Organizations:      Attends Club or Organization Meetings:      Marital Status:    Intimate Partner Violence:      Fear of Current or Ex-Partner:      Emotionally Abused:      Physically Abused:      Sexually Abused:    Depression: Not at risk     PHQ-2 Score: 0   Housing Stability:      Unable to Pay for Housing in the Last Year:      Number of Places Lived in the Last Year:      Unstable Housing in the Last Year:        Functional Status:  Prior to admission patient needed assistance:    no             Additional Information:  Pt lives alone.  He has no DME at home.  He is well supported by a network of friends.  He follows with [facility] for his metastatic prostate cancer.  He is awaiting biopsy results.  Therapy is recommending TCU.  Pt is agreeable to this.  Referrals sent out to [facility], [place], [facility] and [facility].  Pt is aware that he can't be on any chemo or radiation during his TCU stay.  He would not like anything to delay his treatment IF needed/recommended.  He is aware if that is recommended, he wouldn't go to TCU.  He would like a walker issued at discharge . His friend can provide transport.  Will follow along.    Pt/family was given the Medicare Compare List for SNF, with associated star ratings to assist with choices for referrals/discharge planning Yes  Education was given to pt/family that star ratings are updated/maintained by Medicare and can be reviewed by visiting www.medicare.gov Yes      ADDENDUM: 12:08 [facility] doesn't take [employer] insurance.      Care Management Note    Pt follows with [facility].  Will follow along.      Maria D David RN BSN Case  Manager  Inpatient Care Coordination  [facility]  [phone]      Sybil David RN

## 2021-09-21 NOTE — CONSULTS
CLINICAL NUTRITION SERVICES - REASSESSMENT NOTE    Recommendations Ordered by Registered Dietitian (RD):   Continue diet as ordered --. Encouraged 4-6 small meals throughout the day (pt aware there are no restrictions to what he can order)   Discontinued magic cup and ordered ensure enlive trial    Malnutrition:   % Weight Loss:  Up to 10% in 6 months (non-severe malnutrition)  % Intake:  </= 50% for >/= 1 month (severe malnutrition) --> improving?  Subcutaneous Fat Loss:  Orbital region moderate depletion and Thoracic region mild depletion   Muscle Loss:  Temporal region mild depletion and Clavicle bone region mild depletion --> difficult to assess LEs given extent of edema   Fluid Retention:  mild-moderate     Malnutrition Diagnosis: Non-Severe malnutrition (at least)  In Context of:  Acute on Chronic illness or disease     EVALUATION OF PROGRESS TOWARD GOALS   Diet: Regular Diet + Magic Cup      Intake/Tolerance: upon review of the flowsheets, pt is consuming bites-50% of meals ordered. Ordering 1-2 meals per day. Pt reports a poor appetite, dislikes the magic cup however has found foods that he does enjoy on the menu. Largest barrier to PO intake is early satiety and fullness given enlarged lymph nodes. Pt open to ensure enlive trial. Encouraged pt to consume 4-6 small meals per day with an emphasis on protein.     ASSESSED NUTRITION NEEDS:  Dosing Weight 187 lbs (85 kg) - per pt reported dry wt  Estimated Energy Needs: 4849-1614 kcals (25-30 Kcal/Kg)  Justification: maintenance vs repletion   Estimated Protein Needs:  grams protein (1-1.2 g pro/Kg)  Justification: preservation of lean body mass and repletion   Estimated Fluid Needs: per MD      NEW FINDINGS:   - urology following --> stents placed on 9/20  - hem/onc following   - palliative following   - nephrology following   - Retroperitoneal lymphnode biopsy completed on 9/17  - weight: difficult to assess weight status given fluid shifts/edema  (mild-moderate)  Vitals:    09/16/21 0032 09/17/21 1117 09/18/21 0715 09/19/21 0545   Weight: 93.1 kg (205 lb 3.2 oz) 97.8 kg (215 lb 11.2 oz) 97.2 kg (214 lb 4.8 oz) 99.1 kg (218 lb 6.4 oz)    09/20/21 0547 09/20/21 1257 09/21/21 0602   Weight: 101.7 kg (224 lb 4.8 oz) 101.6 kg (224 lb) 100.7 kg (222 lb)     - labs:  Labs:  Electrolytes  Potassium (mmol/L)   Date Value   09/21/2021 4.7   09/20/2021 4.6   09/19/2021 4.6   06/28/2021 4.4   05/07/2020 3.8   05/06/2020 3.9     Phosphorus (mg/dL)   Date Value   09/21/2021 3.6   09/20/2021 2.9   09/19/2021 2.1 (L)   09/18/2021 2.2 (L)   09/18/2021 2.2 (L)   05/06/2020 2.7    Blood Glucose  Glucose (mg/dL)   Date Value   09/21/2021 103 (H)   09/20/2021 90   09/19/2021 97   09/18/2021 94   09/17/2021 99   06/28/2021 102 (H)   05/07/2020 158 (H)   05/06/2020 109 (H)   05/06/2020 96   05/05/2020 111 (H)    Inflammatory Markers  WBC (10e9/L)   Date Value   05/06/2020 11.1 (H)   05/05/2020 11.2 (H)   03/23/2020 15.3 (H)     WBC Count (10e3/uL)   Date Value   09/21/2021 21.2 (H)   09/20/2021 18.7 (H)   09/19/2021 15.7 (H)     WBC x10/cmm (x10/cmm)   Date Value   06/28/2021 10.7     Albumin (g/dL)   Date Value   09/19/2021 2.3 (L)   09/18/2021 2.1 (L)   09/17/2021 2.1 (L)   06/28/2021 4.5   05/06/2020 2.7 (L)   05/05/2020 3.0 (L)      Magnesium (mg/dL)   Date Value   09/17/2021 2.1   09/16/2021 2.1   05/05/2020 2.6 (H)     Sodium (mmol/L)   Date Value   09/21/2021 126 (L)   09/20/2021 124 (L)   09/19/2021 125 (L)   06/28/2021 133 (L)   05/07/2020 138   05/06/2020 137    Renal  Urea Nitrogen (mg/dL)   Date Value   09/21/2021 32 (H)   09/20/2021 25   09/19/2021 20   06/28/2021 19   05/07/2020 32 (H)   05/06/2020 44 (H)     BUN/Creatinine Ratio (no units)   Date Value   06/28/2021 17     Creatinine (mg/dL)   Date Value   09/21/2021 2.06 (H)   09/20/2021 1.77 (H)   09/19/2021 1.36 (H)   06/28/2021 1.12   05/07/2020 1.53 (H)   05/06/2020 1.82 (H)     Additional  Triglycerides (mg/dL)    Date Value   03/25/2021 102   09/10/2019 143   06/25/2015 206 (H)     Ketones Urine (mg/dL)   Date Value   09/15/2021 Negative   05/06/2020 Negative        - medications:    ceFAZolin  2 g Intravenous See Admin Instructions     ciprofloxacin  500 mg Oral Q12H GEN     [Held by provider] enoxaparin ANTICOAGULANT  40 mg Subcutaneous Q24H     gabapentin  100 mg Oral BID     polyethylene glycol  17 g Oral BID     predniSONE  5 mg Oral BID w/meals     sodium chloride (PF)  3 mL Intracatheter Q8H     sodium chloride (PF)  3 mL Intracatheter Q8H     traZODone  50 mg Oral At Bedtime        lactated ringers        acetaminophen **OR** acetaminophen, albuterol, bisacodyl, lidocaine 4%, lidocaine 4%, lidocaine (buffered or not buffered), lidocaine (buffered or not buffered), magnesium hydroxide, melatonin, naloxone **OR** naloxone **OR** naloxone **OR** naloxone, ondansetron **OR** ondansetron, oxyCODONE, senna-docusate, simethicone, sodium chloride (PF), sodium chloride (PF)     Previous Goals:   Patient will consume at least 50% meals TID and 100% of 1 supplement/day   Evaluation: Not met    Previous Nutrition Diagnosis:   Inadequate oral intake related to decreased appetite and taste change with medications and metastatic disease as evidenced by intakes </=50% for >1 month, wt loss up to 10% in 6 months and e/o fat and muscle wasting meeting criteria for malnutrition   Evaluation: No change    MALNUTRITION  % Weight Loss:  Up to 10% in 6 months (non-severe malnutrition)  % Intake:  </= 50% for >/= 1 month (severe malnutrition) --> improving?  Subcutaneous Fat Loss:  Orbital region moderate depletion and Thoracic region mild depletion   Muscle Loss:  Temporal region mild depletion and Clavicle bone region mild depletion --> difficult to assess LEs given extent of edema   Fluid Retention:  mild-moderate     Malnutrition Diagnosis: Non-Severe malnutrition (at least)  In Context of:  Acute on Chronic illness or  disease    CURRENT NUTRITION DIAGNOSIS  Inadequate oral intake related to decreased appetite, early satiety with abdominal fullness, taste change with medications and metastatic disease as evidenced by intakes </=50% for >1 month, wt loss up to 10% in 6 months and e/o fat and muscle wasting meeting criteria for malnutrition     INTERVENTIONS  Recommendations / Nutrition Prescription  Continue diet as ordered --. Encouraged 4-6 small meals throughout the day (pt aware there are no restrictions to what he can order)   Discontinued magic cup and ordered ensure enlive trial     Implementation  Medical Food Supplement: as above  Collaboration and Referral of Nutrition care: discussed pt during interdisciplinary rounds this morning     Goals  Patient will consume at least 50% meals TID and 100% of 1 supplement/day     MONITORING AND EVALUATION:  Progress towards goals will be monitored and evaluated per protocol and Practice Guidelines    Olivia Mondragon, DANNY, LD  Clinical Dietitian

## 2021-09-21 NOTE — CONSULTS
Nephrology Initial Consult  September 20, 2021      Andre Serrano MRN:[id number] YOB: 1955  Date of Admission:9/15/2021  Primary care provider: Chacho Galan  Requesting physician: Lc Haji MD    ASSESSMENT AND RECOMMENDATIONS:   1 Metastatic prostate cancer - bone, LN, Lungs  2. Hypercalcemia - 2/2 #1 , s/p IV ZOmeta, IVF. Miah Ca ~10.5   3 B/l obstruction uropathy with massive hydro - d/t # 1  4 Acute renal failure  - 2/2 #3. Noted prior Rosamaria episodes as well with incomplete recovery . Cr 1.3 at baseline. Upto 1.7   5 Anemia in malignancy - Hgb stable , 9.8   6 Vol overload - with aggressive IVF during this hosp as well as ROSAMARIA   7 Hyponatremia - with ROSAMARIA, poor free water clearance.     Plan -   - Stenting today   - Monitor for post obstructive diuresis. Expect sodium to improve with resolution of obstruction and ROSAMARIA. Monitor for post obstructive diuresis and over rapid correction of sodium. Target of 130-132 by tomorrow AM labs  - Mx of anemia / cancer per oncology  - Okay to give Lasix 40 mg IV if any s/o resp distress; otherwise, hold off as he will likely diurese on his own after relief of obstruction.     Thank you for the consult. Will continue to follow along with you .          José Cochran MD  [facility] - Nephrology   [phone]        REASON FOR CONSULT: ROSAMARIA    HISTORY OF PRESENT ILLNESS:  Andre Serrano is a 66 year old with hx metastatic prostate cancer ( bone mets, intraabd lymphadenopathy, lung mets) with progression despite chemotherapy , now admitted with generalized abd pain, constipation , poor oral intake and found to have ROSAMARIA and hypercalcemia on presentation.     Hypercalcemia treated with IV ZOmeta ( 9/16) and iVF ( 11.8->>>9.1)  CT PE neg but CT abd shows large conglomerate lymphadenopathy encasing abd aorta and portions of renal vessels. CT scan on 9/15 showed b.l moderate hydroneph with b/l upper ureteral obstruction related to massive RP LN.  CT scan done today showed marked b.l hydro.   Cr had risen to 1.7 today from usual baseline of 1.1-1.3 despite IVF. Plan is for attempt to stent by urology later today ( b/l stents have been placed by Dr Virgen by the time of filing of this note)     Pt reports he feel significantly bloated and reports more than 10-15 lbs of fluid weight gain in recent days. Wt up by atleast 9 kg per hosp weight since 9/16, Feels mildly short of breath. No orthpnea, chest pain.            PAST MEDICAL HISTORY:  Reviewed with patient on 09/20/2021  and is as listed in HPI.       MEDICATIONS:  PTA Meds  Prior to Admission medications    Medication Sig Last Dose Taking? Auth Provider   abiraterone (ZYTIGA) 250 MG tablet Take 1,000 mg by mouth daily 9/15/2021 at 10:30 am Yes Unknown, Entered By History   leuprolide (ELIGARD) 45 MG kit 45 mg every 6 months  More than a month at 5 months ago Yes Reported, Patient   ondansetron (ZOFRAN-ODT) 4 MG ODT tab Take 4 mg by mouth daily as needed  Past Month at Unknown time Yes Reported, Patient   polyethylene glycol (MIRALAX) 17 g packet Take 1 packet by mouth daily 9/15/2021 at am Yes Reported, Patient   predniSONE (DELTASONE) 5 MG tablet Take 5 mg by mouth 2 times daily  9/15/2021 at Unknown time Yes Reported, Patient   Psyllium 58.12 % PACK Take 1 packet by mouth 3 times daily 9/15/2021 at Unknown time Yes Unknown, Entered By History   SENEXON-S 8.6-50 MG tablet TAKE TWO TABLETS BY MOUTH TWICE A DAY 9/15/2021 at Unknown time Yes Chacho Galan MD   traZODone (DESYREL) 50 MG tablet TAKE 1-2 TABLETS AT BEDTIME 9/15/2021 at PM Yes Chacho Galan MD      Current Meds    ceFAZolin  2 g Intravenous See Admin Instructions     ciprofloxacin  500 mg Oral Q12H GEN     [Held by provider] enoxaparin ANTICOAGULANT  40 mg Subcutaneous Q24H     gabapentin  100 mg Oral BID     polyethylene glycol  17 g Oral BID     predniSONE  5 mg Oral BID w/meals     sodium chloride (PF)  3 mL Intracatheter Q8H  "    sodium chloride (PF)  3 mL Intracatheter Q8H     traZODone  50 mg Oral At Bedtime     Infusion Meds    lactated ringers         ALLERGIES:    No Known Allergies    REVIEW OF SYSTEMS:  A comprehensive of systems was negative except as noted above.    SOCIAL HISTORY:   Reviewed with patient on 2021     FAMILY MEDICAL HISTORY:   Family History   Problem Relation Age of Onset     Prostate Cancer Father      Brain Cancer Father      Uterine Cancer Mother      Coronary Artery Disease Maternal Grandfather      Diabetes No family hx of      Reviewed with patient on 2021     PHYSICAL EXAM:   Temp  Av.6  F (37  C)  Min: 97.2  F (36.2  C)  Max: 99.7  F (37.6  C)      Pulse  Av.4  Min: 76  Max: 109 Resp  Av.7  Min: 10  Max: 29  SpO2  Av.6 %  Min: 90 %  Max: 100 %       /71 (BP Location: Right arm)   Pulse 76   Temp 98.4  F (36.9  C) (Oral)   Resp 20   Ht 1.753 m (5' 9\")   Wt 101.6 kg (224 lb)   SpO2 95%   BMI 33.08 kg/m     Date 21 07 - 21 0659   Shift 1453-5109 7295-7020 4696-2417 24 Hour Total   INTAKE   P.O. 240 89  329   I.V. 400 50  450   Shift Total(mL/kg) 640(6.3) 139(1.37)  779(7.67)   OUTPUT   Urine  800  800   Shift Total(mL/kg)  800(7.87)  800(7.87)   Weight (kg) 101.6 101.6 101.6 101.6      Admit Weight: 93.1 kg (205 lb 3.2 oz)     GENERAL APPEARANCE: no distress,  awake  EYES: no scleral icterus, pupils equal  HENT: NC/AT,  mouth  without ulcers or lesions  Lymphatics: no cervical or supraclavicular LAD  Endo: no moon facies, no goiter  Pulmonary: diminished at bases  CV: regular rhythm, normal rate, no rub   - JVP -   - Edema +  GI: soft, nontender,   MS: no evidence of inflammation in joints  : + cooper  SKIN: no rash, warm, dry, no cyanosis  NEURO: face symmetric, no asterixis     LABS:   CMP  Recent Labs   Lab 21  0722 21  0712 21  1559 21  0758 21  0438 21  0652 21  0652 21  0644 21  0644 " 09/16/21  0003 09/16/21  0003 09/15/21  1428 09/15/21  1428   * 125*  --  126*  --   --  124*   < > 126*   < > 125*   < > 124*   POTASSIUM 4.6 4.6  --  4.1  --   --  4.2   < > 4.1   < > 4.3   < > 4.2   CHLORIDE 94 93*  --  95  --   --  96   < > 95   < > 95   < > 92*   CO2 21 25  --  23  --   --  22   < > 23   < > 27   < > 25   ANIONGAP 9 7  --  8  --   --  6   < > 8   < > 3   < > 7   GLC 90 97  --  94  --   --  99   < > 85   < > 87   < > 94   BUN 25 20  --  18  --   --  17   < > 16   < > 18   < > 18   CR 1.77* 1.36*  --  1.24  --   --  1.11   < > 1.15   < > 1.23   < > 1.30*   GFRESTIMATED 39* 53*  --  60*  --   --  69   < > 66   < > 61   < > 57*   JAXSON 9.1 9.8  --  9.9  --   --  10.4*   < > 10.5*   < > 11.4*   < > 11.8*   MAG  --   --   --   --   --   --  2.1  --  2.1  --   --   --   --    PHOS 2.9 2.1* 2.2*  --  2.2*   < > 1.4*   < > 1.6*   < > 1.7*  --   --    PROTTOTAL  --  6.9  --  6.4*  --   --  6.6*  --   --   --   --   --  7.2   ALBUMIN  --  2.3*  --  2.1*  --   --  2.1*  --   --   --  2.3*   < > 2.4*   BILITOTAL  --  0.4  --  0.6  --   --  0.2  --   --   --   --   --  0.5   ALKPHOS  --  191*  --  147  --   --  164*  --   --   --   --   --  164*   AST  --  102*  --  48*  --   --  48*  --   --   --   --   --  55*   ALT  --  20  --  14  --   --  17  --   --   --   --   --  18    < > = values in this interval not displayed.     CBC  Recent Labs   Lab 09/20/21  0722 09/19/21  0712 09/18/21  0758 09/17/21  0652   HGB 9.8* 9.0* 8.8* 9.1*   WBC 18.7* 15.7* 14.9* 16.4*   RBC 3.17* 3.03* 2.94* 2.95*   HCT 30.5* 28.5* 27.6* 27.8*   MCV 96 94 94 94   MCH 30.9 29.7 29.9 30.8   MCHC 32.1 31.6 31.9 32.7   RDW 13.8 13.7 13.5 13.3    405 390 353     INR  Recent Labs   Lab 09/17/21  0652   INR 1.02     ABGNo lab results found in last 7 days.   URINE STUDIES  Recent Labs   Lab Test 09/15/21  1433 05/06/20  1059 03/20/20  1755 03/20/20  1453 09/10/19  0000 09/10/19  0000   COLOR Light Yellow Light Yellow Light  Yellow Yellow   < > Yellow   APPEARANCE Clear Slightly Cloudy Clear  --   --   --    URINEGLC Negative Negative Negative  --   --   --    URINEBILI Negative Negative Negative  --   --   --    URINEKETONE Negative Negative Negative  --   --   --    SG 1.027 1.008 1.006 1.005   < > 1.015   UBLD Trace* Small* Moderate*  --   --   --    URINEPH 5.5 5.5 5.5 6.0   < > 5.5   PROTEIN 30 * 30* Negative  --   --   --    UROBILINOGEN  --   --   --  0.2  --  1    NITRITE Positive* Negative Negative Neg   < > Neg   LEUKEST Large* Large* Negative  --   --   --    RBCU 5* 8* 40* 0   < > rare   WBCU 22* 11* 2 0-3   < > 0-3    < > = values in this interval not displayed.       IMAGING:  Personally reviewed the images and findings are as listed in HPI     José Cochran MD

## 2021-09-21 NOTE — CONSULTS
Nephrology Initial Consult  September 20, 2021      Andre Serrano MRN:[id number] YOB: 1955  Date of Admission:9/15/2021  Primary care provider: Chacho Galan  Requesting physician: Lc Haji MD    ASSESSMENT AND RECOMMENDATIONS:   1 Metastatic prostate cancer - bone, LN, Lungs  2. Hypercalcemia - 2/2 #1 , s/p IV ZOmeta, IVF. Miah Ca ~10.5   3 B/l obstruction uropathy with massive hydro - d/t # 1  4 Acute renal failure  - 2/2 #3. Noted prior Rosamaria episodes as well with incomplete recovery . Cr 1.3 at baseline. Upto 1.7   5 Anemia in malignancy - Hgb stable , 9.8   6 Vol overload - with aggressive IVF during this hosp as well as ROSAMARIA   7 Hyponatremia - with ROSAMARIA, poor free water clearance.     Plan -   - Stenting today   - Monitor for post obstructive diuresis. Expect sodium to improve with resolution of obstruction and ROSAMARIA. Monitor for post obstructive diuresis and over rapid correction of sodium. Target of 130-32 by tomorrow AM  - Hold off further IVF. Lasix IV 40 mg PRN for any s/o resp distress; otherwise hold off as pt will likely diurese on his own.   - Mx of cancer/ anemia per oncology team.     Thank you for the consult. Will continue to follow along with you .        José Cochran MD  [facility] - Nephrology   [phone]        REASON FOR CONSULT: ROSAMARIA    HISTORY OF PRESENT ILLNESS:  Andre Serrano is a 66 year old with hx metastatic prostate cancer ( bone mets, intraabd lymphadenopathy, lung mets) with progression despite chemotherapy , now admitted with generalized abd pain, constipation , poor oral intake and found to have ROSAMARIA and hypercalcemia on presentation.     Hypercalcemia treated with IV ZOmeta ( 9/16) and iVF ( 11.8->>>9.1)  CT PE neg but CT abd shows large conglomerate lymphadenopathy encasing abd aorta and portions of renal vessels. CT scan on 9/15 showed b.l moderate hydroneph with b/l upper ureteral obstruction related to massive RP LN. CT scan done  today showed marked b.l hydro.   Cr had risen to 1.7 today from usual baseline of 1.1-1.3 despite IVF. Plan is for attempt to stent by urology later today ( b/l stents have been placed by Dr Virgen by the time of filing of this note)     Pt reports he feel significantly bloated and reports more than 10-15 lbs of fluid weight gain in recent days. Wt up by atleast 9 kg per hosp weight since 9/16, Feels mildly short of breath. No orthpnea, chest pain.            PAST MEDICAL HISTORY:  Reviewed with patient on 09/20/2021  and is as listed in HPI.       MEDICATIONS:  PTA Meds  Prior to Admission medications    Medication Sig Last Dose Taking? Auth Provider   abiraterone (ZYTIGA) 250 MG tablet Take 1,000 mg by mouth daily 9/15/2021 at 10:30 am Yes Unknown, Entered By History   leuprolide (ELIGARD) 45 MG kit 45 mg every 6 months  More than a month at 5 months ago Yes Reported, Patient   ondansetron (ZOFRAN-ODT) 4 MG ODT tab Take 4 mg by mouth daily as needed  Past Month at Unknown time Yes Reported, Patient   polyethylene glycol (MIRALAX) 17 g packet Take 1 packet by mouth daily 9/15/2021 at am Yes Reported, Patient   predniSONE (DELTASONE) 5 MG tablet Take 5 mg by mouth 2 times daily  9/15/2021 at Unknown time Yes Reported, Patient   Psyllium 58.12 % PACK Take 1 packet by mouth 3 times daily 9/15/2021 at Unknown time Yes Unknown, Entered By History   SENEXON-S 8.6-50 MG tablet TAKE TWO TABLETS BY MOUTH TWICE A DAY 9/15/2021 at Unknown time Yes Chacho Galan MD   traZODone (DESYREL) 50 MG tablet TAKE 1-2 TABLETS AT BEDTIME 9/15/2021 at PM Yes Chacho Galan MD      Current Meds    ceFAZolin  2 g Intravenous See Admin Instructions     ciprofloxacin  500 mg Oral Q12H GEN     [Held by provider] enoxaparin ANTICOAGULANT  40 mg Subcutaneous Q24H     gabapentin  100 mg Oral BID     polyethylene glycol  17 g Oral BID     predniSONE  5 mg Oral BID w/meals     sodium chloride (PF)  3 mL Intracatheter Q8H     sodium  "chloride (PF)  3 mL Intracatheter Q8H     traZODone  50 mg Oral At Bedtime     Infusion Meds    lactated ringers         ALLERGIES:    No Known Allergies    REVIEW OF SYSTEMS:  A comprehensive of systems was negative except as noted above.    SOCIAL HISTORY:   Reviewed with patient on 2021     FAMILY MEDICAL HISTORY:   Family History   Problem Relation Age of Onset     Prostate Cancer Father      Brain Cancer Father      Uterine Cancer Mother      Coronary Artery Disease Maternal Grandfather      Diabetes No family hx of      Reviewed with patient on 2021     PHYSICAL EXAM:   Temp  Av.6  F (37  C)  Min: 97.2  F (36.2  C)  Max: 99.7  F (37.6  C)      Pulse  Av.4  Min: 76  Max: 109 Resp  Av.7  Min: 10  Max: 29  SpO2  Av.6 %  Min: 90 %  Max: 100 %       /71 (BP Location: Right arm)   Pulse 76   Temp 98.4  F (36.9  C) (Oral)   Resp 20   Ht 1.753 m (5' 9\")   Wt 101.6 kg (224 lb)   SpO2 95%   BMI 33.08 kg/m     Date 21 07 - 21 0659   Shift 8173-0200 7190-1062 4100-6085 24 Hour Total   INTAKE   P.O. 240 89  329   I.V. 400 50  450   Shift Total(mL/kg) 640(6.3) 139(1.37)  779(7.67)   OUTPUT   Urine  800  800   Shift Total(mL/kg)  800(7.87)  800(7.87)   Weight (kg) 101.6 101.6 101.6 101.6      Admit Weight: 93.1 kg (205 lb 3.2 oz)     GENERAL APPEARANCE: no distress, awake  EYES: no scleral icterus, pupils equal  HENT: NC/AT,  mouth  without ulcers or lesions  Lymphatics: no cervical or supraclavicular LAD  Endo: no moon facies, no goiter  Pulmonary: diminished breath  sounds bilaterally at bases    CV: regular rhythm, normal rate, no rub   - JVP -   - Edema +, pitting b/l  GI: soft, nontender,   MS: no evidence of inflammation in joints  SKIN: no rash, warm, dry, no cyanosis  NEURO: face symmetric, no asterixis     LABS:   CMP  Recent Labs   Lab 21  0792 21  0712 21  1559 21  0758 21  0438 21  0652 21  0652 21  0644 " 09/16/21  0644 09/16/21  0003 09/16/21  0003 09/15/21  1428 09/15/21  1428   * 125*  --  126*  --   --  124*   < > 126*   < > 125*   < > 124*   POTASSIUM 4.6 4.6  --  4.1  --   --  4.2   < > 4.1   < > 4.3   < > 4.2   CHLORIDE 94 93*  --  95  --   --  96   < > 95   < > 95   < > 92*   CO2 21 25  --  23  --   --  22   < > 23   < > 27   < > 25   ANIONGAP 9 7  --  8  --   --  6   < > 8   < > 3   < > 7   GLC 90 97  --  94  --   --  99   < > 85   < > 87   < > 94   BUN 25 20  --  18  --   --  17   < > 16   < > 18   < > 18   CR 1.77* 1.36*  --  1.24  --   --  1.11   < > 1.15   < > 1.23   < > 1.30*   GFRESTIMATED 39* 53*  --  60*  --   --  69   < > 66   < > 61   < > 57*   JAXSON 9.1 9.8  --  9.9  --   --  10.4*   < > 10.5*   < > 11.4*   < > 11.8*   MAG  --   --   --   --   --   --  2.1  --  2.1  --   --   --   --    PHOS 2.9 2.1* 2.2*  --  2.2*   < > 1.4*   < > 1.6*   < > 1.7*  --   --    PROTTOTAL  --  6.9  --  6.4*  --   --  6.6*  --   --   --   --   --  7.2   ALBUMIN  --  2.3*  --  2.1*  --   --  2.1*  --   --   --  2.3*   < > 2.4*   BILITOTAL  --  0.4  --  0.6  --   --  0.2  --   --   --   --   --  0.5   ALKPHOS  --  191*  --  147  --   --  164*  --   --   --   --   --  164*   AST  --  102*  --  48*  --   --  48*  --   --   --   --   --  55*   ALT  --  20  --  14  --   --  17  --   --   --   --   --  18    < > = values in this interval not displayed.     CBC  Recent Labs   Lab 09/20/21  0722 09/19/21  0712 09/18/21  0758 09/17/21  0652   HGB 9.8* 9.0* 8.8* 9.1*   WBC 18.7* 15.7* 14.9* 16.4*   RBC 3.17* 3.03* 2.94* 2.95*   HCT 30.5* 28.5* 27.6* 27.8*   MCV 96 94 94 94   MCH 30.9 29.7 29.9 30.8   MCHC 32.1 31.6 31.9 32.7   RDW 13.8 13.7 13.5 13.3    405 390 353     INR  Recent Labs   Lab 09/17/21  0652   INR 1.02     ABGNo lab results found in last 7 days.   URINE STUDIES  Recent Labs   Lab Test 09/15/21  1433 05/06/20  1059 03/20/20  1755 03/20/20  1453 09/10/19  0000 09/10/19  0000   COLOR Light Yellow Light  Yellow Light Yellow Yellow   < > Yellow   APPEARANCE Clear Slightly Cloudy Clear  --   --   --    URINEGLC Negative Negative Negative  --   --   --    URINEBILI Negative Negative Negative  --   --   --    URINEKETONE Negative Negative Negative  --   --   --    SG 1.027 1.008 1.006 1.005   < > 1.015   UBLD Trace* Small* Moderate*  --   --   --    URINEPH 5.5 5.5 5.5 6.0   < > 5.5   PROTEIN 30 * 30* Negative  --   --   --    UROBILINOGEN  --   --   --  0.2  --  1    NITRITE Positive* Negative Negative Neg   < > Neg   LEUKEST Large* Large* Negative  --   --   --    RBCU 5* 8* 40* 0   < > rare   WBCU 22* 11* 2 0-3   < > 0-3    < > = values in this interval not displayed.         IMAGING:  Personally reviewed the images and findings are as listed in HPI     José Cochran MD

## 2021-09-21 NOTE — PROVIDER NOTIFICATION
DATE:  9/20/2021   TIME OF RECEIPT FROM LAB:  2043  LAB TEST:  Blood culture from [facility], drawn at 0815 this AM.  LAB VALUE:  Gram + cocci in clusters  RESULTS GIVEN WITH READ-BACK TO (PROVIDER):  Admitting hospitalist pager  TIME LAB VALUE REPORTED TO PROVIDER:   2044

## 2021-09-22 PROBLEM — C7A.1: Status: ACTIVE | Noted: 2021-01-01

## 2021-09-22 NOTE — DISCHARGE INSTRUCTIONS
Your home care referral was sent to [facility]  If you haven't heard from them within the next 24-48 hours,  Please call them at [phone]

## 2021-09-22 NOTE — PROGRESS NOTES
[facility]    Hematology / Oncology f/u     Date of Admission:  9/15/2021    Assessment & Plan   Andre Serrano is a 65 year old male who was admitted on 9/15/2021. I was asked to see the patient for history of metastatic prostate cancer.     Assessment:  Plan:  1.  Metastatic prostate cancer with second metastasis to lymph nodes and bones:  -Initial diagnosis in March 2020: Baseline PSA > 700:   -Current treatment is ADT plus abiraterone  -PSA from 9/14/2021 was 1.4  -However CT scan findings show clear progression with massive enlargement of intra-abdominal lymphadenopathy.   - Discussed with Dr. Nguyen, the discordant findings between PSA and CT scan is worrisome for either neuroendocrine differentiation.  Differential diagnosis include possibility of a different histology such as lymphoma.  ---Patient completed retroperitoneal lymph node biopsy on 9/17/2021 results pending.   -Called pathology, biopsy results still pending.     Plan   -Await pathology results to determine next steps in treatment.   -Likely would require change in treatment to chemotherapy.  -Biopsy results were available later this morning: The biopsy was positive for malignancy metastatic poorly differentiated carcinoma with extensive neuroendocrine differentiation.  -Results were discussed with patient, and explained that metastatic poorly differentiated carcinoma with neuroendocrine differentiation has overall poor prognosis.   - We discussed treatment options: Poorly differentiated neuroendocrine tumors are treated similar to small cell lung cancer with chemotherapy regimen such as carboplatin etoposide or folfox.  I gave him information sheet about carboplatin etoposide.  I explained side effects include severe cytopenias, renal dysfunction, neuropathy.  There is a risk of complications such as tumor lysis.   We also discussed the option of transitioning to supportive care only.   -Following discussion patient would like to  proceed with chemotherapy.   -As patient is unlikely to be discharged home in the next few days and aggressive nature of his malignancy will start treatment while in hospital.  Patient will follow up with Dr. Nguyen to continue treatment as outpatient.  - Will start on allopurinol for tumor lysis prophylaxis.   -If patient remains in hospital after chemotherapy will give Neupogen for neutropenia.     2.  Bilateral hydronephrosis/ ANITA   -Patient was seen by urology.  -Worsening creatinine over the weekend.  Had bilateral ureteral stents placed 9/20.   Creatinine today 1.76. Improved from yesterday.    -Nephrology following.     Plan   -Appreciate input from nephrology.  - Monitor Input /output      3.  Hypercalcemia  - IV hydration  -Had 1 dose of Zometa on 9/16/2021  -Calcium is 8.9 today.     Plan   -IV rehydration, to continue  -However, adjust IV fluids as per nephrology recommendations.     4.  Anemia  -Likely secondary to underlying metastatic prostate cancer    Plan   -We will transfuse with hemoglobin less than 7.0     5.  Constipation  -Likely secondary to hypercalcemia.  -Hypercalcemia corrected.  -Had a bowel movement with enema    Plan   -Continue with aggressive bowel regimen  -Please give milk of magnesia, Senokot daily till has good bowel movements  -Enema as needed.       Christo Jasso MD   [facility]  Office:  [phone]    Code Status    Full Code    Reason for Consult   Reason for consult: History of metastatic prostate cancer    Primary Care Physician   Chacho Galan    Chief Complaint     Abdominal pain.       History of Present Illness    Andre Serrano is a 65 year old male who presents with abdominal pain.  Patient has past medical history significant for metastatic prostate cancer, he follows with Dr. Dariel Nguyen.  He had previous history of elevated PSA.  Diagnosis was confirmed in March 2020 when he presented with rising PSA as well as metastatic intra-abdominal  lymphadenopathy, and bone metastasis.   Lymph node biopsy from 2/23/2020 confirmed metastatic adenocarcinoma.  He was initially started on ADT, and abiraterone was added in May 2020 which is his current treatment.  He has had a good PSA response going down from an initial value of 713 in March 2020 to less than 0.5 in March 2021.   Patient reported that approximately 3 months ago he started experiencing new symptoms of abdominal pain.  He describes this as distention.  He had difficulty with bowels experiencing more constipation.  Symptoms were progressive.  He met with Dr. Nguyen recently, laboratory studies show a PSA of 1.4 however his calcium was elevated and because of his abdominal symptoms a CT scan of abdomen pelvis was requested which patient completed on 9/14/2021.  The CT scan showed clear evidence of progression with extensive worsening of intra-abdominal lymphadenopathy new lung metastasis.     CT scan abdomen/pelvis dose at [facility] : Large conglomerate adenopathy is identified diroughout the retroperitoneum with regions appearing fibrotic. For example, large adenopadiy at die retroperitoneum encasing the abdominal aorta and R7C in transverse plane is 13.2 X 6.1 cm, series 3 image 39. The crariiocaudal extent is approximately 15.6 cm on coronal series 4 image 33. This  also encases portions of the renal vessels. The adenopathy contacts and surrounds the mid ureters bilaterally. This adenopadiy also extends into the upper and bilateral pelvis. An example at the left pelvic sidewall posterior to die external iliac vessels is 2.2 x 1.5 cm. An example on die right side adjacent to the external iliac vessels is 3.5 x 2.5 cm. There are other examples. There are multiple enlarged lymph nodes also seen widiiri the mesentery that appears contiguous with die retroperitoneal adenopathy, for mstarice series 3 image 105.    Patient was supposed to go back to see Dr. Nguyen however he ended up in the  ER due to worsening abdominal pain there were no beds in [facility] and he was transferred over to [facility].       Oncologic chronology    Mr. Andre Serrano is a 65-year-old pleasant man with a history of elevated PSA in 09/2019. He had multiple  urinary symptoms including hesitancy, frequency and decrease in urine flow for a while, but worsened  significantly over few days. He was supposed to have a followup September, but lost to followup and he  came to the outpatient office with increasing abdominal pain and left flank pain. He had constipation a few days,  but he was not eating much and was found to have creatinine of 9 and was admitted to the hospital on 03/20/2020.  A Pride catheter was placed and felt better. CT scan of abdomen without contrast was done and it showed moderate  bilateral hydroureteronephrosis with the hydroureter extending to the distal aspect without evidence of urolithiasis. There is moderate asymmetric left  periureteral and perinepliric stranding. Mild nodular enlargement of the prostate and dense in the base of the  urinary bladder with regular layering hyperdensity, inseparable from the nodular protrusion of the prostate. There  is lymphadenopathy, diffuse retroperitoneal and bilateral iliac chain, left periaortic lymph nodes at the level of the  left kidney 2.1 x 2.8 cm. Left external iliac lymph node 1.9 x 3.1 cm. Multiple lymph nodes adjacent to the  inferior mesenteric vasculature measuring 1.4 x 1.6 cm. Numerous bilateral internal iliac lymphadenopathy.  Multiple faint sclerotic lesions in the visualized spine and pelvis, most prominent in the subtrochanteric right  proximal femur, intertrochanteric left proximal femur consistent with metastatic disease.    Biopsy of the pelvic lymph node showed adenocarcinoma consistent with prostate cancer.  Bone scan was done on March 23 which showed widespread metastatic disease including humeri femurs sacrum and ribs in  addition to majority of the spine.    He was given first LHRH analog Firmagon at urology office on March 31, 2020. Abiraterone prednisone regimen was started in May 2020 after approval of the regimen.    PSA in July was down to 2.2 normal. Decreased further in February to 0.5 and 0.4 in April 2021.    Interval history  9/17: Patient continues to remain very uncomfortable with abdominal bloating, has not had a good bowel movement for many weeks.  Becomes nauseous when she tries to eat.     9/20: Continues to be very uncomfortable, with abdominal bloating distention.  Has gained approximately 6 pounds in water weight since yesterday.  Continues to be very swollen in his lower extremities.  Have difficulty breathing nauseous.     9/21: Had bilateral ureteral stent placed yesterday, feels significantly better today.  Continues to have abdominal distention.     9/22: Continues to have abdominal distention, swelling in legs.  No fever.  No significant change since yesterday.     Past Medical History   I have reviewed this patient's medical history and updated it with pertinent information if needed.   Past Medical History:   Diagnosis Date     Cancer (H)      Heavy alcohol use      Hypercholesteremia      Lower urinary tract symptoms (LUTS)      Sleep apnea     uses cpap machine at home       Past Surgical History   I have reviewed this patient's surgical history and updated it with pertinent information if needed.  Past Surgical History:   Procedure Laterality Date     APPENDECTOMY  1972     COMBINED CYSTOSCOPY, RETROGRADES, URETEROSCOPY, INSERT STENT Bilateral 9/20/2021    Procedure: Cystoscopy, bilateral retrograde pyelograms, interpretation of fluoroscopic images, placement of 7 x 24 double-J ureteral stent;  Surgeon: Roberto Virgen MD;  Location: [facility] OR     HERNIORRHAPHY INGUINAL INFANT         Prior to Admission Medications   Prior to Admission Medications   Prescriptions Last Dose Informant Patient  Reported? Taking?   Psyllium 58.12 % PACK 9/15/2021 at Unknown time Self Yes Yes   Sig: Take 1 packet by mouth 3 times daily   SENEXON-S 8.6-50 MG tablet 9/15/2021 at Unknown time  No Yes   Sig: TAKE TWO TABLETS BY MOUTH TWICE A DAY   abiraterone (ZYTIGA) 250 MG tablet 9/15/2021 at 10:30 am  Yes Yes   Sig: Take 1,000 mg by mouth daily   leuprolide (ELIGARD) 45 MG kit More than a month at 5 months ago  Yes Yes   Si mg every 6 months    ondansetron (ZOFRAN-ODT) 4 MG ODT tab Past Month at Unknown time  Yes Yes   Sig: Take 4 mg by mouth daily as needed    polyethylene glycol (MIRALAX) 17 g packet 9/15/2021 at am  Yes Yes   Sig: Take 1 packet by mouth daily   predniSONE (DELTASONE) 5 MG tablet 9/15/2021 at Unknown time  Yes Yes   Sig: Take 5 mg by mouth 2 times daily    traZODone (DESYREL) 50 MG tablet 9/15/2021 at PM  No Yes   Sig: TAKE 1-2 TABLETS AT BEDTIME      Facility-Administered Medications: None     Allergies   No Known Allergies    Social History   I have reviewed this patient's social history and updated it with pertinent information if needed. Andre Serrano  reports that he is a non-smoker but has been exposed to tobacco smoke. He has never used smokeless tobacco. He reports current alcohol use of about 16.7 standard drinks of alcohol per week. He reports current drug use. Drug: Marijuana.    Family History   I have reviewed this patient's family history and updated it with pertinent information if needed.   Family History   Problem Relation Age of Onset     Prostate Cancer Father      Brain Cancer Father      Uterine Cancer Mother      Coronary Artery Disease Maternal Grandfather      Diabetes No family hx of        Review of Systems   Review of system positive for abdominal pain constipation swelling in lower extremities.  Decreased appetite.     Physical Exam   Temp: 98.1  F (36.7  C) Temp src: Oral BP: 111/78 Pulse: 93   Resp: 20 SpO2: 93 % O2 Device: None (Room air)    Vital Signs with  Ranges  Temp:  [98.1  F (36.7  C)-99  F (37.2  C)] 98.1  F (36.7  C)  Pulse:  [88-96] 93  Resp:  [18-24] 20  BP: (109-115)/(72-80) 111/78  SpO2:  [92 %-95 %] 93 %  219 lbs 4.8 oz    Constitutional: Awake, alert, cooperative, no apparent distress. ECOG PS 2  GI: Soft, there is abdominal wall edema, abdomen is distended, firm.  There is mild diffuse tenderness.   lThere is bilateral lower extremity edema    Data   Treatment plan reviewed with patient:

## 2021-09-22 NOTE — PROGRESS NOTES
Care Management Follow Up    Length of Stay (days): 7    Expected Discharge Date: 09/23/2021     Concerns to be Addressed:   TCU     Patient plan of care discussed at interdisciplinary rounds: Yes    Anticipated Discharge Disposition: Transitional Care     Anticipated Discharge Services:  PT/OT  Anticipated Discharge DME: [facility]    Patient/family educated on Medicare website which has current facility and service quality ratings:yes    Education Provided on the Discharge Plan:  pt  Patient/Family in Agreement with the Plan:  pt    Referrals Placed by CM/SW:  TCU    Additional Information:  Pt has been accepted at [facility] for TCU.  They will follow along.  Pt said Pt's friend can provide transport when medically stable.    ADDENDUM 1442. Pt will need chemo for 3 days and then neulasta as outpatient, which TCU can't give.  [facility] TCU bed given up.  Pt agreeable to have friends stay with him and [facility] RN/PT/OT . Referral sent.      Maria D David RN BSN   Inpatient Care Coordination  [facility]  [phone]        Sybil David, RN

## 2021-09-22 NOTE — PROGRESS NOTES
Nephrology Progress Note  09/22/2021           ASSESSMENT AND RECOMMENDATIONS:   1 Metastatic prostate cancer - bone, LN, Lungs  2. Hypercalcemia - 2/2 #1 , s/p IV ZOmeta, IVF. Miah Ca ~10.5   3 B/l obstruction uropathy with massive hydro - d/t # 1.  Status post bilateral stenting on 9/20  4 Acute renal failure  - 2/2 #3. Noted prior Rosamaria episodes as well with incomplete recovery . Cr 1.3 at baseline. Upto 2 -> trending down post stenting  5 Anemia in malignancy - Hgb down to 7.9  6 Vol overload - with aggressive IVF during this hosp as well as ROSAMARIA   7 Hyponatremia - with ROSAMARIA, poor free water clearance.  Improving     Plan -   -Modest diuresis with 2 pound weight loss in 24 hours which is acceptable.  - Hold off further IVF. Lasix IV 40 mg PRN for any s/o resp distress; otherwise hold off as pt is making decent amount of urine and may have postobstructive diuresis.  - Mx of cancer/ anemia per oncology team.       José Cochran MD  [facility] - Nephrology   [phone]      Interval History :   Seen / examined.   No acute issues overnight.    Still feels bloated but weight gradually coming down.  Down by 2 pounds today.  Creatinine trending down.  Sodium improved to 129.   Shortness of breath is better but still shallow.      Review of Systems:   A 4 point review of systems was negative except as noted above.  Notably: fair appetite. no nausea or vomiting or diarrhea.  no confusion,  no fever or chills    Physical Exam:   I/O last 3 completed shifts:  In: 500 [P.O.:500]  Out: 2040 [Urine:2040]    GENERAL APPEARANCE: alert and no distress  EYES:  no scleral icterus, pupils equal  PULM: lungs clear to auscultation, equal air movement, no cyanosis or clubbing  CV: regular rhythm, normal rate, no rub    no   -edema  ++   GI: soft, non tender,   NEURO: mentation intact and speech normal, no asterixis   Pride +     Labs:   All labs reviewed by me  Electrolytes/Renal -   Recent Labs   Lab Test 09/22/21  6164  09/21/21  0828 09/21/21  0649 09/20/21  0722 09/18/21  0438 09/17/21  0652 09/16/21  0644 09/16/21  0644 05/06/20  0645 05/05/20  1536   * 126*  --  124*   < > 124*   < > 126*   < > 134   POTASSIUM 4.4 4.7  --  4.6   < > 4.2   < > 4.1   < > 4.4   CHLORIDE 98 95  --  94   < > 96   < > 95   < > 106   CO2 23 23  --  21   < > 22   < > 23   < > 17*   BUN 35* 32*  --  25   < > 17   < > 16   < > 74*   CR 1.76* 2.06*  --  1.77*   < > 1.11   < > 1.15   < > 4.06*   GLC 93 103*  --  90   < > 99   < > 85   < > 111*   JAXSON 8.9 9.3  --  9.1   < > 10.4*   < > 10.5*   < > 7.1*   MAG  --   --   --   --   --  2.1  --  2.1  --  2.6*   PHOS 2.4*  --  3.6 2.9   < > 1.4*   < > 1.6*   < >  --     < > = values in this interval not displayed.       CBC -   Recent Labs   Lab Test 09/22/21  0746 09/21/21  0828 09/20/21  0722   WBC 17.5* 21.2*  21.2* 18.7*   HGB 7.9* 8.5* 9.8*    421 387       LFTs -   Recent Labs   Lab Test 09/19/21  0712 09/18/21  0758 09/17/21  0652   ALKPHOS 191* 147 164*   BILITOTAL 0.4 0.6 0.2   ALT 20 14 17   * 48* 48*   PROTTOTAL 6.9 6.4* 6.6*   ALBUMIN 2.3* 2.1* 2.1*       Iron Panel - No lab results found.      Current Medications:    ciprofloxacin  500 mg Oral Q12H [facility]     [Held by provider] enoxaparin ANTICOAGULANT  40 mg Subcutaneous Q24H     gabapentin  100 mg Oral BID     polyethylene glycol  17 g Oral BID     potassium & sodium phosphates  1 packet Oral TID     predniSONE  5 mg Oral BID w/meals     sodium chloride (PF)  3 mL Intracatheter Q8H     sodium chloride (PF)  3 mL Intracatheter Q8H     traZODone  50 mg Oral At Bedtime       José Cochran MD

## 2021-09-22 NOTE — PLAN OF CARE
1900-2300H    Assessments: VSS. A&Ox4. Neuros intact. Transfers with A1 with gait belt and walker, ambulated in hallway 2x, slight dyspnea on exertion noted, on room air. Up Ax1 with walker. Abdominal discomfort with some distention and quite taut, relieved with oxycodone. Refused simethicone. Pride patent, drained clear urine, 450ml. 3+ BLE edema. No respiratory distress. No n/v/diarrhea    Treatment Plan: Cipro, pain management, monitor renal function, Follow retroperitoneal lymph node biopsy, Heme/Onc, Uro, Neph, Palliative consulted    Bedside Nurse: Alonzo Childers RN

## 2021-09-22 NOTE — PROGRESS NOTES
[facility]    Hospitalist Progress Note             Date of Admission:  9/15/2021                   Day of hospitalization: 7    Assessment and Plan:   Andre Serrano is a 65 year old male who was sent from [facility] emergency room as a direct admission because they did not have any beds.     Patient has a past medical history significant for metastatic prostate cancer with bone metastasis (follows up with [facility]), constipation, dyslipidemia, sleep apnea.     Patient has been dealing with progressively worsening generalized abdominal pain, abdominal distention and constipation.  His oral intake has been very poor due to the symptoms.  He also gives a history of shortness of breath but not orthopnea.  He has been trying to deal with his constipation for the last many weeks with the help of his primary care physician but with little success.  He has tried fairly high dose of Senokot without any relief.  Milk of magnesia usually works for him a little bit.     He saw his oncologist prior to admission and explained to him his symptoms of abdominal pain, distention and constipation.  He was supposed to get an IV infusion at that time but it was not given.  His oncologist recommended getting a CT scan which was done today.  The CT scan was done at an outside imaging facility and we do not have access to either the report or the images.  Because patient was not feeling quite well he finally decided to go to the emergency room at [facility].     Lab work done in the ER showed hyponatremia of 124 and hypercalcemia.  Creatinine was at baseline.  CT PE study was done which was negative.  The ER physician managed to talk to the radiologist who mentioned massive retroperitoneal lymphadenopathy as well as in the mesentery causing a ureteral obstruction.  We do not have the full radiology report.  Due to these findings as well as metabolic abnormalities, medical team  admission was recommended.  Because they did not have any beds patient was transferred to our facility.  He was also given 1 g of ceftriaxone due to pyuria.  Patient has a chronic Cooper catheter in place which was changed on Monday in urology office.     Metastatic prostate cancer to lymph nodes and bone: Follows with [facility]. PSA 1.4 9/14/21. Diagnosis March 2020. OSH CT scan showed progression with massive enlargement of intra-abdominal lymph nodes  -Oncology consulted - concern for neuroendocrine differentiation vs lymphoma  -Palliative care consulted - provide additional ongoing support and symptom mangement  -IR consulted for lymph node biopsy 9/17/21 - pathology poorly differentiated high-grade neuroendocrine tumor per oncology to start chemotherapy tomorrow  -Defer medical management of cancer to Onc - possible change in treatment to chemotherapy.      Poorly differentiated high-grade neuroendocrine tumor  -Poor prognosis to start chemotherapy tomorrow    Complicated urinary tract infection with chronic indwelling cooper catheter, citrobacter freundii: Patient empirically started on Ceftriaxone. Cooper exchanged 9/13/21. Ceftriaxone 9/15-9/18. Started on oral ciprofloxacin 500 mg BID (9/18) will continue until obstruction issues improved     Chronic urinary retention s/p chronic cooper; bilateral hydronephrosis secondary to lymphadenopathy due to ureteral obstruction: Patient follows with Urology as an outpatient. Cooper catheter exchanged 9/13/21  -Urology consulted - appreciate assistance   - he is status post stenting 9/20, will monitor renal function closely, may need nephrostomy tubes if UOP and renal function worsens    Acute kidney injury  - suspect related to his bilateral hydronephrosis, urology has been consulted  - IVF stopped  - may need eventual diuresis. We will monitor his renal function closely need stabilization of renal function prior to initiating diuresis currently clinically stable  -  renal function improving    Severe hypercalcemia: Calcium 11.8. Ionized calcium 6.1. No clear symptoms of hypercalcemia. Known metastatic cancer. Calcium levels improve with IVF, lasix x1, and Zometa once.   -Heme/onc following - appreciate assistance  -Improved calcium levels - monitor     Hypophosphatemia: Phosphorus 1.6. Replacement protocol.      Hyponatremia: Peripheral edema present. Initially improved with IVF. IVF stopped 9/17/21. Lasix 40 mg IV once with good urine output but given slight increase in Cr.  Multifactorial possibly related to his obstruction resulting in SIADH. We will monitor closely with postobstructive diuresis   - improving    Chronic anemia: Hemoglobin 7.9. Monitor     Constipation due to extensive lymphadenopathy: Continue medications. Patient having BMs but feels like his abdomen is distended. This is likely secondary to lymphadenopathy.  - CT abdomen shows mild constipation  - require enemas intermittently    Leukocytosis  - Improved, suspect reactive, possibly related to worsening lymphadenopathy and hydronephrosis. Patient is also on steroid therapy  -Continue current antibiotic regimen of ciprofloxacin     Insomnia: Trazodone q HS     Non-severe malnutrition: RD consulted; complicates cares    Abdominal distension, pain, and lower extremity edema  - suspect related to his massive lymphadenopathy, monitor  - CT abdomen no evidence of bowel obstruction, mild constipation  -May consider diuresis pending renal function    FEN: Oral hydration; monitor; regular  Activity: As tolerated  DVT Prophylaxis: Enoxaparin (Lovenox) SQ  Family update: Girl friend and nephew at bedside updated     Code Status: Full Code                    Hannah Lopez MD  Text Page (7am - 6pm, M-F)               Subjective   Chief Complaint: Abdominal distension  Subjective: States he feels ok, abdomen feels more distended today. Discussed about chemotherapy and tcu. Patient agreeable to what is needed. Biopsy  "results back, oncology to discuss, goals, chemotherapy with patient        Objective   /78 (BP Location: Right arm)   Pulse 93   Temp 98.1  F (36.7  C) (Oral)   Resp 20   Ht 1.753 m (5' 9\")   Wt 99.5 kg (219 lb 4.8 oz)   SpO2 93%   BMI 32.38 kg/m       Physical Exam  General: Pt in NAD, normal appearance  HEENT: OP clear MMM, no JVD  Lungs: Clear to Auscultation Bilateral, normal breathing  without accessory muscle usage, no wheezing, rhonchi or crackles  Cardiac: +S1, S2, RRR, no MRG, 2 edema bilateral lower extremity  Abdominal: normal bowel sounds, nontender + distended  Skin: warm, dry, normal turgor, no rash  Psyche: A& O x3, appropriate affect             Intake/Output Summary (Last 24 hours) at 9/19/2021 1156  Last data filed at 9/18/2021 2228  Gross per 24 hour   Intake --   Output 1185 ml   Net -1185 ml           Labs and Imaging Results:      Recent Labs   Lab 09/22/21  0746 09/21/21  0828   WBC 17.5* 21.2*  21.2*   HGB 7.9* 8.5*    421        Recent Labs   Lab 09/22/21  0746 09/21/21  0828   * 126*   CO2 23 23   BUN 35* 32*        Recent Labs   Lab 09/17/21  0652   INR 1.02      No results for input(s): CKMB in the last 168 hours.    Invalid input(s): TROPONINT     Recent Labs   Lab 09/19/21  0712 09/18/21  0758   ALBUMIN 2.3* 2.1*   * 48*   ALT 20 14   ALKPHOS 191* 147        Micro:     Radio:  XR Surgery TREE L/T 5 Min Fluoro w Stills   Final Result      CT Abdomen Pelvis w/o Contrast   Final Result   IMPRESSION:    1.  Probable mild constipation. Mild nonspecific dilation of mid small   bowel loops.   2.  Severe abdominal, retroperitoneal, and pelvic lymphadenopathy.   3.  Enlarging bilateral pleural effusions.   4.  Marked bilateral hydronephrosis related to obstruction of the   ureters by retroperitoneal lymphadenopathy.   5.  Trace ascites.   6.  Significant skeletal metastases.      TIFFANIE MORA MD            SYSTEM ID:  [id number]      XR Abdomen 1 View   Final " Result   IMPRESSION: Supine views of the abdomen and pelvis were obtained. Multiple gas-filled nondilated small bowel loops in the central abdomen, otherwise nonobstructive bowel gas pattern. There is U-shaped wire-like object projected over the right upper    quadrant, indeterminate, could be external, which can be confirmed by repeating the x-ray with removing any object overlying the patient's abdomen. Multiple radiodensities project over the pelvic bones, likely represent sclerotic osseous metastases,    better seen on 09/15/2021 exam.          CT Abdomen Retroperitoneal Biopsy   Final Result   IMPRESSION: Successful CT-guided biopsy of retroperitoneal soft   tissue. Sample sent to the lab for evaluation.      ZAK VILLATORO MD            SYSTEM ID:  [id number]              Medications:      Scheduled Meds:      ciprofloxacin  500 mg Oral Q12H GEN     [Held by provider] enoxaparin ANTICOAGULANT  40 mg Subcutaneous Q24H     gabapentin  100 mg Oral BID     polyethylene glycol  17 g Oral BID     potassium & sodium phosphates  1 packet Oral TID     predniSONE  5 mg Oral BID w/meals     sodium chloride (PF)  3 mL Intracatheter Q8H     sodium chloride (PF)  3 mL Intracatheter Q8H     traZODone  50 mg Oral At Bedtime     Continuous Infusions:      PRN Meds:  acetaminophen **OR** acetaminophen, albuterol, bisacodyl, calcium carbonate, lidocaine 4%, lidocaine (buffered or not buffered), magnesium hydroxide, melatonin, naloxone **OR** naloxone **OR** naloxone **OR** naloxone, ondansetron **OR** ondansetron, oxyCODONE, senna-docusate, simethicone, sodium chloride (PF), sodium chloride (PF)

## 2021-09-22 NOTE — PLAN OF CARE
Assessments: VSS on RA. A&Ox4. Neuros intact. Up Ax1 with walker. C/o YADAV. No SOB. Continues to have abdominal fullness/discomfort, abdomen appears softer, still distended. C/o heartburn, given tums. C/o 7/10 back pain, given oxycodone. Pride patent with ronal urine.+2,+3 BLE edema.     Treatment Plan: Cipro, pain management, monitor renal function, Follow retroperitoneal lymph node biopsy, Heme/Onc, Uro, Neph, Palliative

## 2021-09-22 NOTE — PROGRESS NOTES
[facility] Urology Progress Note          Assessment and Plan:     Assessment:    POD 2 Cystoscopy, bilateral retrograde pyelograms, interpretation of fluoroscopic images, placement of 7 x 24 double-J ureteral stent    Metastatic prostate cancer    Hydronephrosis     Hyponatremia      Plan:   -Continue with indwelling Pride catheter (chronic) and indwelling ureteral stents.  -Continue to monitor creatinine.  If not improving, will need to consider possible nephrostomy tubes.  -We discussed possible side effects with an indwelling ureteral stent such as urgency and frequency of urination, dysuria, hematuria, symptoms of urine reflux, and some achiness in the side. Indwelling ureteral stents need to be exchanged every three months or removed by three months. Patient should arrange this with this primary urologist Dr. Paris.   -Discussed the possible need for nephrostomy tube placement if he is unable to tolerate stents or in the case of stent failure.  These also need to be exchanged every 3 months.    Leidy Rojas PA-C   [facility]  [phone]               Interval History:     Doing okay.  Had some back pain and difficulty sleeping.  Patient feels bloated and has edema, which are uncomfortable.  Tmax 99.  Slight tachypnea.  Creatinine improved to 1.76 eGFR 39 (2.06 eGFR 32 (1.77 eGFR 39)).  Pride: clear yellow urine.  Urine output 2040 yesterday.  Denies N/V/F/C/SOB/CP.              Review of Systems:     The 5 point Review of Systems is negative other than noted in the HPI             Medications:     Current Facility-Administered Medications Ordered in Epic   Medication Dose Route Frequency Last Rate Last Admin     acetaminophen (TYLENOL) tablet 650 mg  650 mg Oral Q6H PRN   650 mg at 09/17/21 0906    Or     acetaminophen (TYLENOL) Suppository 650 mg  650 mg Rectal Q6H PRN         albuterol (PROAIR HFA/PROVENTIL HFA/VENTOLIN HFA) 108 (90 Base) MCG/ACT inhaler 2 puff  2 puff  Inhalation 4x Daily PRN         bisacodyl (DULCOLAX) Suppository 10 mg  10 mg Rectal Daily PRN         calcium carbonate (TUMS) chewable tablet 500 mg  500 mg Oral Daily PRN   500 mg at 09/22/21 0007     ciprofloxacin (CIPRO) tablet 500 mg  500 mg Oral Q12H GEN   500 mg at 09/22/21 0815     [Held by provider] enoxaparin ANTICOAGULANT (LOVENOX) injection 40 mg  40 mg Subcutaneous Q24H   40 mg at 09/19/21 0759     gabapentin (NEURONTIN) capsule 100 mg  100 mg Oral BID   100 mg at 09/22/21 0815     lidocaine (LMX4) cream   Topical Q1H PRN         lidocaine 1 % 0.1-1 mL  0.1-1 mL Other Q1H PRN         magnesium hydroxide (MILK OF MAGNESIA) suspension 15-30 mL  15-30 mL Oral Daily PRN   30 mL at 09/19/21 0843     melatonin tablet 3 mg  3 mg Oral At Bedtime PRN         naloxone (NARCAN) injection 0.2 mg  0.2 mg Intravenous Q2 Min PRN        Or     naloxone (NARCAN) injection 0.4 mg  0.4 mg Intravenous Q2 Min PRN        Or     naloxone (NARCAN) injection 0.2 mg  0.2 mg Intramuscular Q2 Min PRN        Or     naloxone (NARCAN) injection 0.4 mg  0.4 mg Intramuscular Q2 Min PRN         ondansetron (ZOFRAN-ODT) ODT tab 4 mg  4 mg Oral Q6H PRN        Or     ondansetron (ZOFRAN) injection 4 mg  4 mg Intravenous Q6H PRN   4 mg at 09/18/21 1817     oxyCODONE (ROXICODONE) tablet 5-10 mg  5-10 mg Oral Q4H PRN   10 mg at 09/22/21 0510     polyethylene glycol (MIRALAX) Packet 17 g  17 g Oral BID   17 g at 09/22/21 0815     potassium & sodium phosphates (NEUTRA-PHOS) Packet 1 packet  1 packet Oral TID         predniSONE (DELTASONE) tablet 5 mg  5 mg Oral BID w/meals   5 mg at 09/22/21 0815     senna-docusate (SENOKOT-S/PERICOLACE) 8.6-50 MG per tablet 2 tablet  2 tablet Oral At Bedtime PRN         simethicone (MYLICON) chewable tablet 160 mg  160 mg Oral 4x Daily PRN         sodium chloride (PF) 0.9% PF flush 3 mL  3 mL Intracatheter Q8H   3 mL at 09/21/21 1313     sodium chloride (PF) 0.9% PF flush 3 mL  3 mL Intracatheter q1 min prn          sodium chloride (PF) 0.9% PF flush 3 mL  3 mL Intracatheter Q8H   3 mL at 09/22/21 0815     sodium chloride (PF) 0.9% PF flush 3 mL  3 mL Intracatheter q1 min prn         traZODone (DESYREL) tablet 50 mg  50 mg Oral At Bedtime   50 mg at 09/21/21 2305     No current [facility]-ordered outpatient medications on file.                  Physical Exam:   Vitals were reviewed  Patient Vitals for the past 8 hrs:   BP Temp Temp src Pulse Resp SpO2 Weight   09/22/21 0810 -- -- -- -- -- -- 99.5 kg (219 lb 4.8 oz)   09/22/21 0800 111/78 98.1  F (36.7  C) Oral 93 20 93 % --     GEN: NAD, sitting in chair  EYES: EOMI  MOUTH: MMM  NECK: Supple  RESP: Unlabored breathing  CARDIAC: 2-3+ bilateral lower extremity edema  ABD: distended  NEURO: AAO  URO: Pride in place draining clear yellow urine.           Data:     Lab Results   Component Value Date    NTBNPI 902 (H) 09/15/2021     Lab Results   Component Value Date    WBC 17.5 (H) 09/22/2021    WBC 21.2 (H) 09/21/2021    WBC 21.2 (H) 09/21/2021    HGB 7.9 (L) 09/22/2021    HGB 8.5 (L) 09/21/2021    HGB 9.8 (L) 09/20/2021    HCT 24.8 (L) 09/22/2021    HCT 26.6 (L) 09/21/2021    HCT 30.5 (L) 09/20/2021    MCV 97 09/22/2021    MCV 94 09/21/2021    MCV 96 09/20/2021     09/22/2021     09/21/2021     09/20/2021     Lab Results   Component Value Date    INR 1.02 09/17/2021    INR 1.13 03/23/2020     Biopsy results pending.

## 2021-09-23 NOTE — PROGRESS NOTES
Care Management Follow Up    Length of Stay (days): 8    Expected Discharge Date: 09/27/2021     Concerns to be Addressed: home care       Patient plan of care discussed at interdisciplinary rounds: Yes    Anticipated Discharge Disposition: home care      Anticipated Discharge Services:  RN/PT/OT  Anticipated Discharge DME: [facility]    Patient/family educated on Medicare website which has current facility and service quality ratings:  yes  Education Provided on the Discharge Plan:  pt  Patient/Family in Agreement with the Plan: pt     Referrals Placed by CM/SW:  Home care    Additional Information:  Pt has been accepted by [facility] RN/PT/OT  . They can start service around 9/29.      Maria D David RN BSN   Inpatient Care Coordination  [facility]  [phone]        Sybil David, RN

## 2021-09-23 NOTE — PROGRESS NOTES
[facility]    Hospitalist Progress Note             Date of Admission:  9/15/2021                   Day of hospitalization: 8    Assessment and Plan:   Andre Serrano is a 65 year old male who was sent from [facility] emergency room as a direct admission because they did not have any beds.     Patient has a past medical history significant for metastatic prostate cancer with bone metastasis (follows up [facility]), constipation, dyslipidemia, sleep apnea.     Patient has been dealing with progressively worsening generalized abdominal pain, abdominal distention and constipation.  His oral intake has been very poor due to the symptoms.  He also gives a history of shortness of breath but not orthopnea.  He has been trying to deal with his constipation for the last many weeks with the help of his primary care physician but with little success.  He has tried fairly high dose of Senokot without any relief.  Milk of magnesia usually works for him a little bit.     He saw his oncologist prior to admission and explained to him his symptoms of abdominal pain, distention and constipation.  He was supposed to get an IV infusion at that time but it was not given.  His oncologist recommended getting a CT scan which was done today.  The CT scan was done at an outside imaging facility and we do not have access to either the report or the images.  Because patient was not feeling quite well he finally decided to go to the emergency room at [facility].     Lab work done in the ER showed hyponatremia of 124 and hypercalcemia.  Creatinine was at baseline.  CT PE study was done which was negative.  The ER physician managed to talk to the radiologist who mentioned massive retroperitoneal lymphadenopathy as well as in the mesentery causing a ureteral obstruction.  We do not have the full radiology report.  Due to these findings as well as metabolic abnormalities, medical team  admission was recommended.  Because they did not have any beds patient was transferred to our facility.  He was also given 1 g of ceftriaxone due to pyuria.  Patient has a chronic Cooper catheter in place which was changed on Monday in urology office.     In the hospital patient was empirically treated with ceftriaxone for his UTI, cultures came back Citrobacter and Enterobacter resistant and ceftriaxone sepsis antibiotics were switched to ciprofloxacin. He had a biopsy performed for his lymph nodes. There was a concern that his prostate cancer has progressed to poorly differentiated tumor . During the hospital his renal function continued to deteriorate urology was reconsulted patient had bilateral stent placed by urology 9/20. Urology and oncology has been following patient his renal function continues to improve. His pathology came back high-grade neuroendocrine tumor and to start chemotherapy 9/22, will need to monitor for tumor lysis syndrome he has been empirically started on allopurinol.     Metastatic prostate cancer to lymph nodes and bone: Follows with [facility]. PSA 1.4 9/14/21. Diagnosis March 2020. OSH CT scan showed progression with massive enlargement of intra-abdominal lymph nodes  -Oncology consulted - concern for neuroendocrine differentiation vs lymphoma  -Palliative care consulted - provide additional ongoing support and symptom mangement  -IR consulted for lymph node biopsy 9/17/21 - pathology poorly differentiated high-grade neuroendocrine tumor per oncology to start chemotherapy tomorrow    Poorly differentiated high-grade neuroendocrine tumor  -Poor prognosis to start chemotherapy today. At risk for tumor lysis syndrome will need to monitor closely has been started on allopurinol.    Complicated urinary tract infection with chronic indwelling cooper catheter, citrobacter freundii: Patient empirically started on Ceftriaxone. Cooper exchanged 9/13/21. Ceftriaxone 9/15-9/18. Started on oral  ciprofloxacin 500 mg BID (9/18) will need 10 days total of therapy     Chronic urinary retention s/p chronic cooper; bilateral hydronephrosis secondary to lymphadenopathy due to ureteral obstruction: Patient follows with Urology as an outpatient. Cooper catheter exchanged 9/13/21  -Urology consulted - appreciate assistance   - he is status post stenting 9/20, will monitor renal function closely, may need nephrostomy tubes if UOP and renal function worsens    Acute kidney injury  - suspect related to his bilateral hydronephrosis, urology has been consulted  - IVF stopped  - may need eventual diuresis. We will monitor his renal function closely need stabilization of renal function prior to initiating diuresis currently clinically stable  - renal function improving    Severe hypercalcemia: Calcium 11.8. Ionized calcium 6.1. No clear symptoms of hypercalcemia. Known metastatic cancer. Calcium levels improve with IVF, lasix x1, and Zometa once.   -Heme/onc following - appreciate assistance  -Improved calcium levels - monitor     Hypophosphatemia: Phosphorus 1.6. Replacement protocol.      Hyponatremia: Peripheral edema present. Initially improved with IVF. IVF stopped 9/17/21. Lasix 40 mg IV once with good urine output but given slight increase in Cr.  Multifactorial possibly related to his obstruction resulting in SIADH. We will monitor closely with postobstructive diuresis   - improving    Chronic anemia: Hemoglobin 7.9. Monitor     Constipation due to extensive lymphadenopathy: Continue medications. Patient having BMs but feels like his abdomen is distended. This is likely secondary to lymphadenopathy.  - CT abdomen shows mild constipation  - require enemas intermittently, ordered Fleet enema today    Leukocytosis  - Improved, suspect reactive, possibly related to worsening lymphadenopathy and hydronephrosis. Patient is also on steroid therapy  -Continue current antibiotic regimen of ciprofloxacin     Insomnia:  "Trazodone q HS     Non-severe malnutrition: RD consulted; complicates cares    Abdominal distension, pain, and lower extremity edema  - suspect related to his massive lymphadenopathy, monitor  - CT abdomen no evidence of bowel obstruction, mild constipation  -May consider diuresis pending renal function    FEN: Oral hydration; monitor; regular  Activity: As tolerated  DVT Prophylaxis: Enoxaparin (Lovenox) SQ  Family update: Girl friend and nephew at bedside updated     Code Status: DNR/DNI                    Hannah Lopez MD  Text Page (7am - 6pm, M-F)               Subjective   Chief Complaint: Abdominal distension  Subjective: States he feels ok, abdomen feels more distended today. Cussed with patient about goals of care as he is to start chemotherapy today . He seems to have fairly good understanding of goals of care and prognosis is going to be filling out his advanced directives today and wishes to change his code to DNR/DNI today.        Objective   /59 (BP Location: Right arm)   Pulse 93   Temp 98.8  F (37.1  C) (Oral)   Resp 20   Ht 1.753 m (5' 9.02\")   Wt 99.6 kg (219 lb 9.6 oz)   SpO2 94%   BMI 32.41 kg/m       Physical Exam  General: Pt in NAD, normal appearance  HEENT: OP clear MMM, no JVD  Lungs: Clear to Auscultation Bilateral, normal breathing  without accessory muscle usage, no wheezing, rhonchi or crackles  Cardiac: +S1, S2, RRR, no MRG, 2 edema bilateral lower extremity  Abdominal: normal bowel sounds, nontender + distended  Skin: warm, dry, normal turgor, no rash  Psyche: A& O x3, appropriate affect             Intake/Output Summary (Last 24 hours) at 9/19/2021 1156  Last data filed at 9/18/2021 2228  Gross per 24 hour   Intake --   Output 1185 ml   Net -1185 ml           Labs and Imaging Results:      Recent Labs   Lab 09/23/21  0707 09/22/21  0746   WBC 19.0*  19.0* 17.5*   HGB 8.1* 7.9*    376        Recent Labs   Lab 09/23/21  0808 09/23/21  0707   * 130*   CO2 24 26 "   BUN 31* 32*        Recent Labs   Lab 09/17/21  0652   INR 1.02      No results for input(s): CKMB in the last 168 hours.    Invalid input(s): TROPONINT     Recent Labs   Lab 09/23/21  0808 09/19/21  0712   ALBUMIN 1.9* 2.3*   AST 89* 102*   ALT 17 20   ALKPHOS 169* 191*        Micro:     Radio:  XR Surgery TREE L/T 5 Min Fluoro w Stills   Final Result      CT Abdomen Pelvis w/o Contrast   Final Result   IMPRESSION:    1.  Probable mild constipation. Mild nonspecific dilation of mid small   bowel loops.   2.  Severe abdominal, retroperitoneal, and pelvic lymphadenopathy.   3.  Enlarging bilateral pleural effusions.   4.  Marked bilateral hydronephrosis related to obstruction of the   ureters by retroperitoneal lymphadenopathy.   5.  Trace ascites.   6.  Significant skeletal metastases.      TIFFANIE MORA MD            SYSTEM ID:  [id number]      XR Abdomen 1 View   Final Result   IMPRESSION: Supine views of the abdomen and pelvis were obtained. Multiple gas-filled nondilated small bowel loops in the central abdomen, otherwise nonobstructive bowel gas pattern. There is U-shaped wire-like object projected over the right upper    quadrant, indeterminate, could be external, which can be confirmed by repeating the x-ray with removing any object overlying the patient's abdomen. Multiple radiodensities project over the pelvic bones, likely represent sclerotic osseous metastases,    better seen on 09/15/2021 exam.          CT Abdomen Retroperitoneal Biopsy   Final Result   IMPRESSION: Successful CT-guided biopsy of retroperitoneal soft   tissue. Sample sent to the lab for evaluation.      ZAK VILLATORO MD            SYSTEM ID:  [id number]              Medications:      Scheduled Meds:      allopurinol  300 mg Oral Daily     CARBOplatin  385 mg Intravenous Once     ciprofloxacin  500 mg Oral Q12H [facility]     [START ON 9/24/2021] dexamethasone  8 mg Oral Daily     enoxaparin ANTICOAGULANT  40 mg Subcutaneous Q24H     etoposide   75 mg/m2 (Treatment Plan Recorded) Intravenous Q24H     [START ON 9/26/2021] filgrastim  480 mcg Subcutaneous Daily     gabapentin  100 mg Oral BID     polyethylene glycol  17 g Oral BID     predniSONE  5 mg Oral BID w/meals     sodium chloride (PF)  3 mL Intracatheter Q8H     sodium chloride (PF)  3 mL Intracatheter Q8H     sodium phosphate  1 enema Rectal Once     traZODone  50 mg Oral At Bedtime     Continuous Infusions:      sodium chloride 75 mL/hr at 09/23/21 0901     PRN Meds:  sodium chloride 0.9%, acetaminophen **OR** acetaminophen, albuterol, albuterol, albuterol, bisacodyl, calcium carbonate, diphenhydrAMINE, EPINEPHrine, famotidine, lidocaine 4%, lidocaine (buffered or not buffered), LORazepam, LORazepam, magnesium hydroxide, melatonin, naloxone **OR** naloxone **OR** naloxone **OR** naloxone, ondansetron **OR** ondansetron, oxyCODONE, prochlorperazine, prochlorperazine, senna-docusate, simethicone, sodium chloride (PF)

## 2021-09-23 NOTE — PROGRESS NOTES
"SPIRITUAL HEALTH SERVICES Progress Note  [facility] S[facility] 5    Saw pt Negrito for Advance Care Planning (ACP) education and for an emotional support check-in.  Negrito's SO Rosita and best friend Jonathon were present, and Negrito was reading birthday cards.  He welcomed a  visit.  Negrito reported that he is starting a new, aggressive chemo regimen and shared \"today's a good day.\"  He talked about how he met Rosita and Jonathon and reminisced about good times, especially at their cabins near [place] (Negrito and Jonathon have cabins next door to each other).  Negrito plans to have his sister come up from [place] and help him at home once he discharges.  Provided emotional support through reflective listening and affirmation of his support network.    Provided ACP education.  Negrito reported that he will likely name his sister, Rosita, and Jonathon as his healthcare agents.  He endorsed completing a healthcare directive in conversation with Rosita and Jonathon.    Plan: Will follow up tomorrow to assist pt in notarizing his healthcare directive, if he has completed one.    Doroteo Castrejon M.Div., [facility]  Staff   Phone [phone]    "

## 2021-09-23 NOTE — PLAN OF CARE
Assessments: VSS on RA. A&Ox4. Neuros intact. Up Ax1 with walker. C/o YADAV. No SOB. Continues to have abdominal fullness/discomfort, abdomen appears softer, still distended. Tap water enema give, 1 soft BM. Pride patent with ronal urine.+2,+3 BLE edema.    Major shift events: Pathology results in. MD updated pt on results. Discussed options with pt.    Treatment Plan: Cipro, pain management, monitor renal function, Heme/Onc, Uro, Neph, Palliative. Likely starting chemotherapy tomorrow    Bedside Nurse: Neftaly Reinoso RN

## 2021-09-23 NOTE — PROGRESS NOTES
[facility]    Hematology / Oncology f/u     Date of Admission:  9/15/2021    Assessment & Plan   Andre Serrano is a 65 year old male who was admitted on 9/15/2021. I was asked to see the patient for history of metastatic prostate cancer.     Assessment:  Plan:  1.  Metastatic prostate cancer with second metastasis to lymph nodes and bones:  -Initial diagnosis in March 2020: Baseline PSA > 700:   -Current treatment was ADT plus abiraterone  -PSA from 9/14/2021 was 1.4  -However CT scan findings show clear progression with massive enlargement of intra-abdominal lymphadenopathy.   - Discussed with Dr. Nguyen, the discordant findings between PSA and CT scan is worrisome for  neuroendocrine differentiation.     ---Patient completed retroperitoneal lymph node biopsy on 9/17/2021 The biopsy was positive for malignancy metastatic poorly differentiated carcinoma with extensive neuroendocrine differentiation.    Plan   -Results were discussed with patient, I explained that metastatic poorly differentiated carcinoma with neuroendocrine differentiation has overall poor prognosis.  In various cities patients with aggressive neuroendocrine carcinoma have overall survival ranging from 2 months to 6 to 7 months.  Response rates to chemotherapy are in the range of 45%  - We discussed treatment options: Poorly differentiated neuroendocrine tumors are treated similar to small cell lung cancer with chemotherapy regimen such as carboplatin etoposide or folfox. I gave him information sheet about carboplatin etoposide.  I explained side effects include severe cytopenias, renal dysfunction, neuropathy.  There is a risk of complications such as tumor lysis.  Plan was also discussed with Dr. Ngueyn.   - We also discussed the option of transitioning to supportive care only.   -Following discussion patient would like to proceed with chemotherapy.   -As patient is unlikely to be discharged home in the next few days and  aggressive nature of his malignancy will start treatment while in hospital.  Patient will follow up with Dr. Nguyen to continue treatment as outpatient.  -Patient was started on allopurinol for tumor lysis prophylaxis on /922  -Start chemotherapy with carboplatin and etoposide (AUC of 5, etoposide dose reduced to 75 mg/m  due to renal function) cycle 1 Day 1 9/23    -If patient remains in hospital after chemotherapy will give Neupogen for neutropenia.   -We will start on normal saline at 75 mg/h.  Monitor input output.  -tumor lysis labs daily.     2.  Bilateral hydronephrosis/ ANITA   -Patient was seen by urology.  -Worsening creatinine over the weekend.  Had bilateral ureteral stents placed 9/20.   Creatinine today 1.6. Improved from yesterday.    -Nephrology following.     Plan   -Appreciate input from nephrology.  - Monitor Input /output      3.  Hypercalcemia  - IV hydration  -Had 1 dose of Zometa on 9/16/2021  -Calcium is 8.9 today.     Plan   -IV rehydration, to continue  -However, adjust IV fluids as per nephrology recommendations.     4.  Anemia  -Likely secondary to underlying metastatic prostate cancer    Plan   -We will transfuse with hemoglobin less than 7.0     5.  Constipation  -Likely secondary to hypercalcemia.  -Hypercalcemia corrected.  -Had a bowel movement with enema    Plan   -Continue with aggressive bowel regimen  -Please give milk of magnesia, Senokot daily till has good bowel movements  -Enema as needed.       Christo Jasso MD   [facility]  Office:  [phone]    Code Status    Full Code    Reason for Consult   Reason for consult: History of metastatic prostate cancer    Primary Care Physician   Chacho Galan    Chief Complaint     Abdominal pain.       History of Present Illness    Andre Serrano is a 65 year old male who presents with abdominal pain.  Patient has past medical history significant for metastatic prostate cancer, he follows with Dr. Dariel Nguyen.  He had  previous history of elevated PSA.  Diagnosis was confirmed in March 2020 when he presented with rising PSA as well as metastatic intra-abdominal lymphadenopathy, and bone metastasis.   Lymph node biopsy from 2/23/2020 confirmed metastatic adenocarcinoma.  He was initially started on ADT, and abiraterone was added in May 2020 which is his current treatment.  He has had a good PSA response going down from an initial value of 713 in March 2020 to less than 0.5 in March 2021.   Patient reported that approximately 3 months ago he started experiencing new symptoms of abdominal pain.  He describes this as distention.  He had difficulty with bowels experiencing more constipation.  Symptoms were progressive.  He met with Dr. Nguyen recently, laboratory studies show a PSA of 1.4 however his calcium was elevated and because of his abdominal symptoms a CT scan of abdomen pelvis was requested which patient completed on 9/14/2021.  The CT scan showed clear evidence of progression with extensive worsening of intra-abdominal lymphadenopathy new lung metastasis.     CT scan abdomen/pelvis dose at [place] : Large conglomerate adenopathy is identified diroughout the retroperitoneum with regions appearing fibrotic. For example, large adenopadiy at die retroperitoneum encasing the abdominal aorta and R7C in transverse plane is 13.2 X 6.1 cm, series 3 image 39. The crariiocaudal extent is approximately 15.6 cm on coronal series 4 image 33. This  also encases portions of the renal vessels. The adenopathy contacts and surrounds the mid ureters bilaterally. This adenopadiy also extends into the upper and bilateral pelvis. An example at the left pelvic sidewall posterior to die external iliac vessels is 2.2 x 1.5 cm. An example on die right side adjacent to the external iliac vessels is 3.5 x 2.5 cm. There are other examples. There are multiple enlarged lymph nodes also seen widiiri the mesentery that appears contiguous with die  retroperitoneal adenopathy, [facility] series [facility] image 105.    Patient was supposed to go back to see Dr. Nguyen however he ended up in the ER due to worsening abdominal pain there were no beds in [facility] and he was transferred over to [facility].       Oncologic chronology    Mr. Andre Serrano is a 65-year-old pleasant man with a history of elevated PSA in 09/2019. He had multiple  urinary symptoms including hesitancy, frequency and decrease in urine flow for a while, but worsened  significantly over few days. He was supposed to have a followup September, but lost to followup and he  came to the outpatient office with increasing abdominal pain and left flank pain. He had constipation a few days,  but he was not eating much and was found to have creatinine of 9 and was admitted to the hospital on 03/20/2020.  A Pride catheter was placed and felt better. CT scan of abdomen without contrast was done and it showed moderate  bilateral hydroureteronephrosis with the hydroureter extending to the distal aspect without evidence of urolithiasis. There is moderate asymmetric left  periureteral and perinepliric stranding. Mild nodular enlargement of the prostate and dense in the base of the  urinary bladder with regular layering hyperdensity, inseparable from the nodular protrusion of the prostate. There  is lymphadenopathy, diffuse retroperitoneal and bilateral iliac chain, left periaortic lymph nodes at the level of the  left kidney 2.1 x 2.8 cm. Left external iliac lymph node 1.9 x 3.1 cm. Multiple lymph nodes adjacent to the  inferior mesenteric vasculature measuring 1.4 x 1.6 cm. Numerous bilateral internal iliac lymphadenopathy.  Multiple faint sclerotic lesions in the visualized spine and pelvis, most prominent in the subtrochanteric right  proximal femur, intertrochanteric left proximal femur consistent with metastatic disease.    Biopsy of the pelvic lymph node showed adenocarcinoma consistent  with prostate cancer.  Bone scan was done on March 23 which showed widespread metastatic disease including humeri femurs sacrum and ribs in addition to majority of the spine.    He was given first L[facility] analog Firmagon at urology office on March 31, 2020. Abiraterone prednisone regimen was started in May 2020 after approval of the regimen.    PSA in July was down to 2.2 normal. Decreased further in February to 0.5 and 0.4 in April 2021.    Interval history  9/17: Patient continues to remain very uncomfortable with abdominal bloating, has not had a good bowel movement for many weeks.  Becomes nauseous when she tries to eat.     9/20: Continues to be very uncomfortable, with abdominal bloating distention.  Has gained approximately 6 pounds in water weight since yesterday.  Continues to be very swollen in his lower extremities.  Have difficulty breathing nauseous.     9/21: Had bilateral ureteral stent placed yesterday, feels significantly better today.  Continues to have abdominal distention.     9/22: Continues to have abdominal distention, swelling in legs.  No fever.  No significant change since yesterday.   9/23: Patient reported some improvement in his abdominal distention.  Has not had a bowel movement yesterday.  No fever.     Past Medical History   I have reviewed this patient's medical history and updated it with pertinent information if needed.   Past Medical History:   Diagnosis Date     Cancer (H)      Heavy alcohol use      Hypercholesteremia      Lower urinary tract symptoms (LUTS)      Sleep apnea     uses cpap machine at home       Past Surgical History   I have reviewed this patient's surgical history and updated it with pertinent information if needed.  Past Surgical History:   Procedure Laterality Date     APPENDECTOMY  1972     COMBINED CYSTOSCOPY, RETROGRADES, URETEROSCOPY, INSERT STENT Bilateral 9/20/2021    Procedure: Cystoscopy, bilateral retrograde pyelograms, interpretation of fluoroscopic  images, placement of 7 x 24 double-J ureteral stent;  Surgeon: Roberto Virgen MD;  Location: [facility] OR     HERNIORRHAPHY INGUINAL INFANT         Prior to Admission Medications   Prior to Admission Medications   Prescriptions Last Dose Informant Patient Reported? Taking?   Psyllium 58.12 % PACK 9/15/2021 at Unknown time Self Yes Yes   Sig: Take 1 packet by mouth 3 times daily   SENEXON-S 8.6-50 MG tablet 9/15/2021 at Unknown time  No Yes   Sig: TAKE TWO TABLETS BY MOUTH TWICE A DAY   abiraterone (ZYTIGA) 250 MG tablet 9/15/2021 at 10:30 am  Yes Yes   Sig: Take 1,000 mg by mouth daily   leuprolide (ELIGARD) 45 MG kit More than a month at 5 months ago  Yes Yes   Si mg every 6 months    ondansetron (ZOFRAN-ODT) 4 MG ODT tab Past Month at Unknown time  Yes Yes   Sig: Take 4 mg by mouth daily as needed    polyethylene glycol (MIRALAX) 17 g packet 9/15/2021 at am  Yes Yes   Sig: Take 1 packet by mouth daily   predniSONE (DELTASONE) 5 MG tablet 9/15/2021 at Unknown time  Yes Yes   Sig: Take 5 mg by mouth 2 times daily    traZODone (DESYREL) 50 MG tablet 9/15/2021 at PM  No Yes   Sig: TAKE 1-2 TABLETS AT BEDTIME      Facility-Administered Medications: None     Allergies   No Known Allergies    Social History   I have reviewed this patient's social history and updated it with pertinent information if needed. Andre SANCHEZ Zach  reports that he is a non-smoker but has been exposed to tobacco smoke. He has never used smokeless tobacco. He reports current alcohol use of about 16.7 standard drinks of alcohol per week. He reports current drug use. Drug: Marijuana.    Family History   I have reviewed this patient's family history and updated it with pertinent information if needed.   Family History   Problem Relation Age of Onset     Prostate Cancer Father      Brain Cancer Father      Uterine Cancer Mother      Coronary Artery Disease Maternal Grandfather      Diabetes No family hx of        Review of Systems   Review of  system positive for abdominal pain constipation swelling in lower extremities.  Decreased appetite.     Physical Exam   Temp: 98.8  F (37.1  C) Temp src: Oral BP: 111/59 Pulse: 93   Resp: 20 SpO2: 94 % O2 Device: None (Room air)    Vital Signs with Ranges  Temp:  [98.6  F (37  C)-99.1  F (37.3  C)] 98.8  F (37.1  C)  Pulse:  [92-93] 93  Resp:  [20] 20  BP: (111-126)/(59-76) 111/59  SpO2:  [91 %-96 %] 94 %  219 lbs 9.6 oz    Constitutional: Awake, alert, cooperative, no apparent distress. ECOG PS 2  GI: Soft, there is abdominal wall edema, abdomen is distended, firm.  There is mild diffuse tenderness.   lThere is bilateral lower extremity edema    Data   Treatment plan reviewed with patient:

## 2021-09-23 NOTE — PLAN OF CARE
Assessments: A&Ox4. VSS on RA. Neuros intact. C/o YADAV, denies SOB and N/V. Continues to have abdominal fullness/discomfort. Abdomen still distended. No BM overnight, passing flatus. Pt rated pain 7/10, prn oxy given. Pride patent with ronal urine. +3 BLE edema. Upx1 with walker and gait belt.     Major shift events: None.     Treatment Plan: Cipro, pain management, monitor renal function, Heme/Onc, Uro, Neph, Palliative. Likely starting chemotherapy today    Camille Galdamez, RN

## 2021-09-24 NOTE — PROGRESS NOTES
[facility]    Hematology / Oncology f/u     Date of Admission:  9/15/2021    Assessment & Plan   Andre Serrano is a 65 year old male who was admitted on 9/15/2021. I was asked to see the patient for history of metastatic prostate cancer.     Assessment:  Plan:  1.  Metastatic prostate cancer with second metastasis to lymph nodes and bones:  -Initial diagnosis in March 2020: Baseline PSA > 700:   -Current treatment was ADT plus abiraterone  -PSA from 9/14/2021 was 1.4  -However CT scan findings show clear progression with massive enlargement of intra-abdominal lymphadenopathy.   - Discussed with Dr. Nguyen, the discordant findings between PSA and CT scan is worrisome for  neuroendocrine differentiation.     ---Patient completed retroperitoneal lymph node biopsy on 9/17/2021 The biopsy was positive for malignancy metastatic poorly differentiated carcinoma with extensive neuroendocrine differentiation.  -Overall patient tolerated first day of chemotherapy well.  No signs of tumor lysis renal function continues to improve.     Plan   -Results were discussed with patient, I explained that metastatic poorly differentiated carcinoma with neuroendocrine differentiation has overall poor prognosis.  In various cities patients with aggressive neuroendocrine carcinoma have overall survival ranging from 2 months to 6 to 7 months.  Response rates to chemotherapy are in the range of 45%  - We discussed treatment options: Poorly differentiated neuroendocrine tumors are treated similar to small cell lung cancer with chemotherapy regimen such as carboplatin etoposide or folfox. I gave him information sheet about carboplatin etoposide.  I explained side effects include severe cytopenias, renal dysfunction, neuropathy.  There is a risk of complications such as tumor lysis.  Plan was also discussed with Dr. Nguyen.   - We also discussed the option of transitioning to supportive care only.   -Following discussion  patient would like to proceed with chemotherapy.   -As patient is unlikely to be discharged home in the next few days and aggressive nature of his malignancy will start treatment while in hospital.  Patient will follow up with Dr. Nguyen to continue treatment as outpatient.  -Patient was started on allopurinol for tumor lysis prophylaxis on /922  -Started chemotherapy with carboplatin and etoposide (AUC of 5, etoposide dose reduced to 75 mg/m  due to renal function) cycle 1 Day 1 9/23.   -Proceed with cycle 1 day 2 today we will continue IV fluids, strict input output monitoring monitoring for tumor lysis:     -If patient remains in hospital after chemotherapy will give Neupogen for neutropenia.   -tumor lysis labs daily.     2.  Bilateral hydronephrosis/ ANITA   -Patient was seen by urology.  -Worsening creatinine over the weekend.  Had bilateral ureteral stents placed 9/20.   Creatinine today 1.6. Improved from yesterday.    -Nephrology following.     Plan   -Appreciate input from nephrology.  - Monitor Input /output      3.  Hypercalcemia  - IV hydration  -Had 1 dose of Zometa on 9/16/2021  -Calcium is 8.9 today.     Plan   -IV rehydration, to continue  -However, adjust IV fluids as per nephrology recommendations.     4.  Anemia  -Likely secondary to underlying metastatic prostate cancer    Plan   -We will transfuse with hemoglobin less than 7.0     5.  Constipation  -Likely secondary to hypercalcemia.  -Hypercalcemia corrected.  -Had a bowel movement with enema    Plan   -Continue with aggressive bowel regimen  -Please give milk of magnesia, Senokot daily till has good bowel movements  -Enema as needed.       Christo Jasso MD   [facility]  Office:  [phone]    Code Status    No CPR- Do NOT Intubate    Reason for Consult   Reason for consult: History of metastatic prostate cancer    Primary Care Physician   Chacho Galan    Chief Complaint     Abdominal pain.       History of Present Illness    Andre  DANIEL Serrano is a 65 year old male who presents with abdominal pain.  Patient has past medical history significant for metastatic prostate cancer, he follows with Dr. Dariel Nguyen.  He had previous history of elevated PSA.  Diagnosis was confirmed in March 2020 when he presented with rising PSA as well as metastatic intra-abdominal lymphadenopathy, and bone metastasis.   Lymph node biopsy from 2/23/2020 confirmed metastatic adenocarcinoma.  He was initially started on ADT, and abiraterone was added in May 2020 which is his current treatment.  He has had a good PSA response going down from an initial value of 713 in March 2020 to less than 0.5 in March 2021.   Patient reported that approximately 3 months ago he started experiencing new symptoms of abdominal pain.  He describes this as distention.  He had difficulty with bowels experiencing more constipation.  Symptoms were progressive.  He met with Dr. Nguyen recently, laboratory studies show a PSA of 1.4 however his calcium was elevated and because of his abdominal symptoms a CT scan of abdomen pelvis was requested which patient completed on 9/14/2021.  The CT scan showed clear evidence of progression with extensive worsening of intra-abdominal lymphadenopathy new lung metastasis.     CT scan abdomen/pelvis dose at [facility] : Large conglomerate adenopathy is identified diroughout the retroperitoneum with regions appearing fibrotic. For example, large adenopadiy at die retroperitoneum encasing the abdominal aorta and R7C in transverse plane is 13.2 X 6.1 cm, series 3 image 39. The crariiocaudal extent is approximately 15.6 cm on coronal series 4 image 33. This  also encases portions of the renal vessels. The adenopathy contacts and surrounds the mid ureters bilaterally. This adenopadiy also extends into the upper and bilateral pelvis. An example at the left pelvic sidewall posterior to die external iliac vessels is 2.2 x 1.5 cm. An example on die right  side adjacent to the external iliac vessels is 3.5 x 2.5 cm. There are other examples. There are multiple enlarged lymph nodes also seen widiiri the mesentery that appears contiguous with die retroperitoneal adenopathy, [facility] series [facility] image 105.    Patient was supposed to go back to see Dr. Nguyen however he ended up in the ER due to worsening abdominal pain there were no beds in [facility] and he was transferred over to [facility].       Oncologic chronology    Mr. Andre Serrano is a 65-year-old pleasant man with a history of elevated PSA in 09/2019. He had multiple  urinary symptoms including hesitancy, frequency and decrease in urine flow for a while, but worsened  significantly over few days. He was supposed to have a followup September, but lost to followup and he  came to the outpatient office with increasing abdominal pain and left flank pain. He had constipation a few days,  but he was not eating much and was found to have creatinine of 9 and was admitted to the hospital on 03/20/2020.  A Pride catheter was placed and felt better. CT scan of abdomen without contrast was done and it showed moderate  bilateral hydroureteronephrosis with the hydroureter extending to the distal aspect without evidence of urolithiasis. There is moderate asymmetric left  periureteral and perinepliric stranding. Mild nodular enlargement of the prostate and dense in the base of the  urinary bladder with regular layering hyperdensity, inseparable from the nodular protrusion of the prostate. There  is lymphadenopathy, diffuse retroperitoneal and bilateral iliac chain, left periaortic lymph nodes at the level of the  left kidney 2.1 x 2.8 cm. Left external iliac lymph node 1.9 x 3.1 cm. Multiple lymph nodes adjacent to the  inferior mesenteric vasculature measuring 1.4 x 1.6 cm. Numerous bilateral internal iliac lymphadenopathy.  Multiple faint sclerotic lesions in the visualized spine and pelvis, most prominent  in the subtrochanteric right  proximal femur, intertrochanteric left proximal femur consistent with metastatic disease.    Biopsy of the pelvic lymph node showed adenocarcinoma consistent with prostate cancer.  Bone scan was done on March 23 which showed widespread metastatic disease including humeri femurs sacrum and ribs in addition to majority of the spine.    He was given first LHRH analog Firmagon at urology office on March 31, 2020. Abiraterone prednisone regimen was started in May 2020 after approval of the regimen.    PSA in July was down to 2.2 normal. Decreased further in February to 0.5 and 0.4 in April 2021.    Interval history  9/17: Patient continues to remain very uncomfortable with abdominal bloating, has not had a good bowel movement for many weeks.  Becomes nauseous when she tries to eat.     9/20: Continues to be very uncomfortable, with abdominal bloating distention.  Has gained approximately 6 pounds in water weight since yesterday.  Continues to be very swollen in his lower extremities.  Have difficulty breathing nauseous.     9/21: Had bilateral ureteral stent placed yesterday, feels significantly better today.  Continues to have abdominal distention.     9/22: Continues to have abdominal distention, swelling in legs.  No fever.  No significant change since yesterday.   9/23: Patient reported some improvement in his abdominal distention.  Has not had a bowel movement yesterday.  No fever.   9/24: Patient reported that the first day of chemotherapy was tolerated fairly well, he denies nausea abdominal pain.  Continues to have flatus had small bowel movements abdominal distention seems to be stable.  Feet continues to be swollen.     Past Medical History   I have reviewed this patient's medical history and updated it with pertinent information if needed.   Past Medical History:   Diagnosis Date     Cancer (H)      Heavy alcohol use      Hypercholesteremia      Lower urinary tract symptoms (LUTS)       Sleep apnea     uses cpap machine at home       Past Surgical History   I have reviewed this patient's surgical history and updated it with pertinent information if needed.  Past Surgical History:   Procedure Laterality Date     APPENDECTOMY       COMBINED CYSTOSCOPY, RETROGRADES, URETEROSCOPY, INSERT STENT Bilateral 2021    Procedure: Cystoscopy, bilateral retrograde pyelograms, interpretation of fluoroscopic images, placement of 7 x 24 double-J ureteral stent;  Surgeon: Roberto Virgen MD;  Location: [facility] OR     HERNIORRHAPHY INGUINAL INFANT         Prior to Admission Medications   Prior to Admission Medications   Prescriptions Last Dose Informant Patient Reported? Taking?   Psyllium 58.12 % PACK 9/15/2021 at Unknown time Self Yes Yes   Sig: Take 1 packet by mouth 3 times daily   SENEXON-S 8.6-50 MG tablet 9/15/2021 at Unknown time  No Yes   Sig: TAKE TWO TABLETS BY MOUTH TWICE A DAY   abiraterone (ZYTIGA) 250 MG tablet 9/15/2021 at 10:30 am  Yes Yes   Sig: Take 1,000 mg by mouth daily   leuprolide (ELIGARD) 45 MG kit More than a month at 5 months ago  Yes Yes   Si mg every 6 months    ondansetron (ZOFRAN-ODT) 4 MG ODT tab Past Month at Unknown time  Yes Yes   Sig: Take 4 mg by mouth daily as needed    polyethylene glycol (MIRALAX) 17 g packet 9/15/2021 at am  Yes Yes   Sig: Take 1 packet by mouth daily   predniSONE (DELTASONE) 5 MG tablet 9/15/2021 at Unknown time  Yes Yes   Sig: Take 5 mg by mouth 2 times daily    traZODone (DESYREL) 50 MG tablet 9/15/2021 at PM  No Yes   Sig: TAKE 1-2 TABLETS AT BEDTIME      Facility-Administered Medications: None     Allergies   No Known Allergies    Social History   I have reviewed this patient's social history and updated it with pertinent information if needed. Andre Serrano  reports that he is a non-smoker but has been exposed to tobacco smoke. He has never used smokeless tobacco. He reports current alcohol use of about 16.7 standard drinks of  alcohol per week. He reports current drug use. Drug: Marijuana.    Family History   I have reviewed this patient's family history and updated it with pertinent information if needed.   Family History   Problem Relation Age of Onset     Prostate Cancer Father      Brain Cancer Father      Uterine Cancer Mother      Coronary Artery Disease Maternal Grandfather      Diabetes No family hx of        Review of Systems   Review of system positive for abdominal pain constipation swelling in lower extremities.  Decreased appetite.     Physical Exam   Temp: 98.4  F (36.9  C) Temp src: Oral BP: 119/68 Pulse: 88   Resp: 18 SpO2: 92 % O2 Device: None (Room air)    Vital Signs with Ranges  Temp:  [98  F (36.7  C)-98.4  F (36.9  C)] 98.4  F (36.9  C)  Pulse:  [88-95] 88  Resp:  [16-18] 18  BP: (110-119)/(64-75) 119/68  SpO2:  [92 %-95 %] 92 %  224 lbs 6.4 oz    Constitutional: Awake, alert, cooperative, no apparent distress. ECOG PS 2  -Mucous membranes dry.   GI: Soft, there is abdominal wall edema, abdomen is distended, slightly softer than since yesterday.  there is mild diffuse tenderness.   There is bilateral lower extremity edema    Data   Treatment plan reviewed with patient:

## 2021-09-24 NOTE — ACP (ADVANCE CARE PLANNING)
SPIRITUAL HEALTH SERVICES Progress Note   [facility] Advance Directive    Facilitated notarization of healthcare directive for Andre Serrano.      This author and other chaplains remain available per pt's request.    Doroteo Castrejon M.Div., [facility]  Staff   Phone [phone]

## 2021-09-24 NOTE — PROGRESS NOTES
[facility]    Medicine Progress Note - Hospitalist Service       Date of Admission:  9/15/2021    Assessment & Plan           Andre Serrano is a 65 year old male with a past medical history significant for prostate cancer, sleep apnea, hyperlipidemia, hypertension.  Presented to outside facility with worsening abdominal pain.  Found to have hyponatremia, constipation, and concern for metastatic disease that has now been shown to have extensive neuroendocrine carcinoma.    1.  Metastatic prostate cancer.  -Oncology following.    2.  Poorly differentiated high-grade neuroendocrine carcinoma.  Lymph node biopsied 9/17.  Oncology following.  -Chemotherapy initiated during hospital stay.  -Concern for tumor lysis syndrome.  -Continue IV fluids.  -Allopurinol 300 mg a day.  -Continue dexamethasone.    3.  Complicated urinary tract infection with chronic indwelling Pride catheter.  Urine culture from 9/15 growing Citrobacter and Enterobacter.  Had initially been on IV ceftriaxone.  Transition to oral ciprofloxacin on 9/18.    -Continue ciprofloxacin.    4.  Acute kidney injury on chronic kidney disease stage II.  Creatinine peaked at 2.1 during hospital stay.  Has improved to 1.4 today.  -Continue IV fluids.  -Avoid nephrotoxins as able.  -Recheck metabolic panel tomorrow.    5.  Hyponatremia.  Mild.  Sodium improved to 130 today.  -Continue to monitor metabolic panel daily.    6.  Hypophosphatemia.  At time of presentation.  Now resolved.  -Stop phosphorus replacement protocol.      7.  Hypercalcemia.  At time of presentation.  Has resolved.  Continue IV fluids.    8.  Constipation.  -Continue scheduled and as needed stool softeners.    9.  Significant bilateral lower extremity edema.  -Continue to monitor.  -Currently needs IV fluids due to concern for possible tumor lysis syndrome.  -Nephrology also following.  -I did discuss with him today that he needs to be up and ambulating as  possible.             Diet: Regular Diet Adult  Snacks/Supplements Adult: Ensure Enlive; With Meals    DVT Prophylaxis: Enoxaparin (Lovenox) SQ  Cooper Catheter: PRESENT, indication:  (chronic cooper ), Other (Comment) (chronic - retention)  Central Lines: None  Code Status: No CPR- Do NOT Intubate          Nadir Peralta DO  Hospitalist Service  [facility]  Securely message with the Vocera Web Console (learn more here)  Text page via VideoJax Paging/Directory      Clinically Significant Risk Factors Present on Admission                ______________________________________________________________________    Interval History   Occasional abdominal pain.  Occasional nausea.  Does notice significant lower extremity edema.  Feels that edema is fairly stable from previous.  Denies chest pain, shortness of breath, fevers, chills.    Data reviewed today: I reviewed all medications, new labs and imaging results over the last 24 hours.    Physical Exam   Vital Signs: Temp: 98.4  F (36.9  C) Temp src: Oral BP: 113/69 Pulse: 98   Resp: 16 SpO2: 94 % O2 Device: None (Room air)    Weight: 224 lbs 6.4 oz  Gen:  NAD, A&Ox3.  Eyes:  PERRL, sclera anicteric.  OP:  MMM, no lesions.  Neck:  Supple.  CV:  Regular, no murmurs.  Lung:  CTA b/l, normal effort.  Ab: Mild distention, +BS, soft.  Skin:  Warm, dry to touch.  No rash.  Ext:  2+ pitting edema LE b/l.      Data   Recent Labs   Lab 09/24/21  0706 09/23/21  0808 09/23/21  0707 09/22/21  0746 09/22/21  0746 09/20/21  0722 09/19/21  0712   WBC 22.4*  --  19.0*  19.0*  --  17.5*   < > 15.7*   HGB 8.2*  --  8.1*  --  7.9*   < > 9.0*   MCV 96  --  97  --  97   < > 94     --  348  --  376   < > 405   * 129* 130*   < > 129*   < > 125*   POTASSIUM 4.2 4.3 4.7   < > 4.4   < > 4.6   CHLORIDE 99 97 99   < > 98   < > 93*   CO2 21 24 26   < > 23   < > 25   BUN 34* 31* 32*   < > 35*   < > 20   CR 1.44* 1.61* 1.62*   < > 1.76*   < > 1.36*   ANIONGAP 10 8 5    < > 8   < > 7   JAXSON 8.7 9.2 9.0   < > 8.9   < > 9.8   * 89 93   < > 93   < > 97   ALBUMIN  --  1.9*  --   --   --   --  2.3*   PROTTOTAL  --  6.5*  --   --   --   --  6.9   BILITOTAL  --  0.4  --   --   --   --  0.4   ALKPHOS  --  169*  --   --   --   --  191*   ALT  --  17  --   --   --   --  20   AST  --  89*  --   --   --   --  102*    < > = values in this interval not displayed.

## 2021-09-24 NOTE — PLAN OF CARE
Assessments: A&Ox4. VSS on RA. Neuros intact. Continues to have dyspnea on exertion. Denies SOB, N/V. C/o abdominal fullness and discomfort, also stated his legs are sore as well. PRN oxy given. Passing gas, no BM overnight. +3 bilateral edema in lower extremities. Pt on chemo precautions. Up x1 with walker and belt.     Major shift events: None.     Treatment Plan: Cipro, pain management, monitor renal function, Heme/Onc, Uro, Neph, Palliative. Chemo therapy started yesterday, carboplatin completed, etoposide today and next day. PO decadron.     Camille Galdamez RN

## 2021-09-24 NOTE — PROGRESS NOTES
Nephrology Progress Note  09/24/2021           ASSESSMENT AND RECOMMENDATIONS:   1 Metastatic prostate cancer with neuroendocrine differentiation- bone, LN, Lungs.  Started chemotherapy 9/23  2. Hypercalcemia - 2/2 #1 , s/p IV ZOmeta, IVF. Miah Ca ~10.5   3 B/l obstruction uropathy with massive hydro - d/t # 1.  Status post bilateral stenting on 9/20  4 Acute renal failure  - 2/2 #3. Noted prior Rosamaria episodes as well with incomplete recovery . Cr 1.3 at baseline. Upto 2 -> trending down post stenting  5 Anemia in malignancy - Hgb better at 8.2  6 Vol overload - with aggressive IVF during this hosp as well as ROSAMARIA   7 Hyponatremia - with ROSAMARIA, poor free water clearance.  Improving     Plan -   -Back on IV fluid secondary to starting chemotherapy today and concern for tumor lysis.  Good urine output.  Weight trending up.  Monitor tumor lysis labs daily.  Uric acid and phosphorus normal.    -High risk of ROSAMARIA with initiation of carboplatin based chemotherapy. Monitor daily renal function panel.  - Mx of cancer/ anemia per oncology team.       José Cochran MD  [facility] - Nephrology   [phone]      Interval History :   Seen / examined.   No acute issues overnight.   Tolerated chemo well.  Tumor lysis labs within range.  Good urine output.  Weight trending up with IV fluid.  No shortness of breath.  Creatinine trending down.  Sodium better.        Review of Systems:   A 4 point review of systems was negative except as noted above.  Notably: fair appetite. no nausea or vomiting or diarrhea.  no confusion,  no fever or chills. Constipation +    Physical Exam:   I/O last 3 completed shifts:  In: 1588.75 [P.O.:690; I.V.:898.75]  Out: 1950 [Urine:1950]    GENERAL APPEARANCE: alert and no distress  EYES:  no scleral icterus, pupils equal  PULM: lungs clear to auscultation, equal air movement, no cyanosis or clubbing  CV: regular rhythm, normal rate, no rub    no   -edema  ++   GI: soft, non tender,   NEURO:  mentation intact and speech normal, no asterixis   Pride +     Labs:   All labs reviewed by me  Electrolytes/Renal -   Recent Labs   Lab Test 09/24/21  0706 09/23/21  0808 09/23/21  0707 09/22/21  0746 09/22/21  0746 09/18/21  0438 09/17/21  0652 09/16/21  0644 09/16/21  0644   * 129* 130*   < > 129*   < > 124*   < > 126*   POTASSIUM 4.2 4.3 4.7   < > 4.4   < > 4.2   < > 4.1   CHLORIDE 99 97 99   < > 98   < > 96   < > 95   CO2 21 24 26   < > 23   < > 22   < > 23   BUN 34* 31* 32*   < > 35*   < > 17   < > 16   CR 1.44* 1.61* 1.62*   < > 1.76*   < > 1.11   < > 1.15   * 89 93   < > 93   < > 99   < > 85   JAXSON 8.7 9.2 9.0   < > 8.9   < > 10.4*   < > 10.5*   MAG 1.9  --   --   --   --   --  2.1  --  2.1   PHOS 2.9  --  2.6  --  2.4*   < > 1.4*   < > 1.6*    < > = values in this interval not displayed.       CBC -   Recent Labs   Lab Test 09/24/21  0706 09/23/21  0707 09/22/21  0746   WBC 22.4* 19.0*  19.0* 17.5*   HGB 8.2* 8.1* 7.9*    348 376       LFTs -   Recent Labs   Lab Test 09/23/21  0808 09/19/21  0712 09/18/21  0758   ALKPHOS 169* 191* 147   BILITOTAL 0.4 0.4 0.6   ALT 17 20 14   AST 89* 102* 48*   PROTTOTAL 6.5* 6.9 6.4*   ALBUMIN 1.9* 2.3* 2.1*       Iron Panel - No lab results found.      Current Medications:    allopurinol  300 mg Oral Daily     ciprofloxacin  500 mg Oral Q12H GEN     dexamethasone  8 mg Oral Daily     enoxaparin ANTICOAGULANT  40 mg Subcutaneous Q24H     etoposide  75 mg/m2 (Treatment Plan Recorded) Intravenous Q24H     [START ON 9/26/2021] filgrastim  480 mcg Subcutaneous Daily     gabapentin  100 mg Oral BID     polyethylene glycol  17 g Oral BID     predniSONE  5 mg Oral BID w/meals     sodium chloride (PF)  3 mL Intracatheter Q8H     sodium chloride (PF)  3 mL Intracatheter Q8H     traZODone  50 mg Oral At Bedtime       sodium chloride 75 mL/hr at 09/24/21 0415     José Cochran MD

## 2021-09-24 NOTE — PROGRESS NOTES
[facility] Urology Progress Note          Assessment and Plan:     Assessment:    POD 4 Cystoscopy, bilateral retrograde pyelograms, interpretation of fluoroscopic images, placement of 7 x 24 double-J ureteral stent    Metastatic prostate cancer- poorly differentiated carcinoma with extensive neuroendocrine differentiation    Hydronephrosis     Hyponatremia      Plan:   -Continue with indwelling Pride catheter (chronic) and indwelling ureteral stents.  -Continue to monitor creatinine.  Creatinine continues to improve.  We will plan on holding off on nephrostomy tubes at this time.  However, this may be necessary in the future if stents fail or patient is unable to tolerate indwelling ureteral stents.  -We discussed possible side effects with an indwelling ureteral stent such as urgency and frequency of urination, dysuria, hematuria, symptoms of urine reflux, and some achiness in the side. Indwelling ureteral stents need to be exchanged every three months or removed by three months. Patient should arrange this with this primary urologist Dr. Paris.   -Discussed the possible need for nephrostomy tube placement if he is unable to tolerate stents or in the case of stent failure.  These also need to be exchanged every 3 months.  -We will sign off.  Please contact us with any urological concerns.    Leidy Rojas PA-C   [facility] Urology  [phone]               Interval History:     Patient doing well.  Started a new aggressive chemotherapy regimen given the findings of lymph node biopsy which showed poorly differentiated carcinoma with extensive neuroendocrine differentiation.  Denies N/V/F/C/SOB/CP.  Pride catheter in place draining clear yellow urine.  WBC 22.4 (19.0).  Hemoglobin 8.2.  Creatinine 1.44 with EGFR 50.              Review of Systems:     The 5 point Review of Systems is negative other than noted in the HPI             Medications:     Current Facility-Administered Medications  Ordered in Epic   Medication Dose Route Frequency Last Rate Last Admin     0.9% sodium chloride BOLUS  500 mL Intravenous Once PRN         acetaminophen (TYLENOL) tablet 650 mg  650 mg Oral Q6H PRN   650 mg at 09/17/21 0906    Or     acetaminophen (TYLENOL) Suppository 650 mg  650 mg Rectal Q6H PRN         albuterol (PROAIR HFA/PROVENTIL HFA/VENTOLIN HFA) 108 (90 Base) MCG/ACT inhaler 1-2 puff  1-2 puff Inhalation Once PRN         albuterol (PROAIR HFA/PROVENTIL HFA/VENTOLIN HFA) 108 (90 Base) MCG/ACT inhaler 2 puff  2 puff Inhalation 4x Daily PRN         albuterol (PROVENTIL) neb solution 2.5 mg  2.5 mg Nebulization Once PRN         allopurinol (ZYLOPRIM) tablet 300 mg  300 mg Oral Daily   300 mg at 09/24/21 0923     bisacodyl (DULCOLAX) Suppository 10 mg  10 mg Rectal Daily PRN         calcium carbonate (TUMS) chewable tablet 500 mg  500 mg Oral Daily PRN   500 mg at 09/22/21 2158     ciprofloxacin (CIPRO) tablet 500 mg  500 mg Oral Q12H GEN   500 mg at 09/24/21 0802     dexamethasone (DECADRON) tablet 8 mg  8 mg Oral Daily   8 mg at 09/24/21 0924     diphenhydrAMINE (BENADRYL) injection 50 mg  50 mg Intravenous Once PRN         enoxaparin ANTICOAGULANT (LOVENOX) injection 40 mg  40 mg Subcutaneous Q24H   40 mg at 09/23/21 1500     EPINEPHrine PF (ADRENALIN) injection 0.3 mg  0.3 mg Intramuscular Q5 Min PRN         etoposide (TOPOSAR) 165 mg in sodium chloride 0.9 % 508.25 mL infusion  75 mg/m2 (Treatment Plan Recorded) Intravenous Q24H 340 mL/hr at 09/23/21 1529 165 mg at 09/23/21 1529     famotidine (PEPCID) injection 20 mg  20 mg Intravenous Once PRN         [START ON 9/26/2021] filgrastim (NEUPOGEN) injection 480 mcg  480 mcg Subcutaneous Daily         gabapentin (NEURONTIN) capsule 100 mg  100 mg Oral BID   100 mg at 09/24/21 0924     lidocaine (LMX4) cream   Topical Q1H PRN         lidocaine 1 % 0.1-1 mL  0.1-1 mL Other Q1H PRN         LORazepam (ATIVAN) injection 0.5-1 mg  0.5-1 mg Intravenous Q6H PRN          LORazepam (ATIVAN) tablet 0.5-1 mg  0.5-1 mg Oral Q6H PRN         magnesium hydroxide (MILK OF MAGNESIA) suspension 15-30 mL  15-30 mL Oral Daily PRN   30 mL at 09/19/21 0843     melatonin tablet 3 mg  3 mg Oral At Bedtime PRN         naloxone (NARCAN) injection 0.2 mg  0.2 mg Intravenous Q2 Min PRN        Or     naloxone (NARCAN) injection 0.4 mg  0.4 mg Intravenous Q2 Min PRN        Or     naloxone (NARCAN) injection 0.2 mg  0.2 mg Intramuscular Q2 Min PRN        Or     naloxone (NARCAN) injection 0.4 mg  0.4 mg Intramuscular Q2 Min PRN         ondansetron (ZOFRAN-ODT) ODT tab 4 mg  4 mg Oral Q6H PRN        Or     ondansetron (ZOFRAN) injection 4 mg  4 mg Intravenous Q6H PRN   4 mg at 09/18/21 1817     oxyCODONE (ROXICODONE) tablet 5-10 mg  5-10 mg Oral Q4H PRN   10 mg at 09/24/21 0634     polyethylene glycol (MIRALAX) Packet 17 g  17 g Oral BID   17 g at 09/24/21 0923     predniSONE (DELTASONE) tablet 5 mg  5 mg Oral BID w/meals   5 mg at 09/24/21 0923     prochlorperazine (COMPAZINE) injection 5 mg  5 mg Intravenous Q6H PRN         prochlorperazine (COMPAZINE) tablet 5 mg  5 mg Oral Q6H PRN         senna-docusate (SENOKOT-S/PERICOLACE) 8.6-50 MG per tablet 2 tablet  2 tablet Oral At Bedtime PRN         simethicone (MYLICON) chewable tablet 160 mg  160 mg Oral 4x Daily PRN         sodium chloride (PF) 0.9% PF flush 3 mL  3 mL Intracatheter Q8H   3 mL at 09/23/21 1214     sodium chloride (PF) 0.9% PF flush 3 mL  3 mL Intracatheter Q8H   3 mL at 09/24/21 0923     sodium chloride (PF) 0.9% PF flush 3 mL  3 mL Intracatheter q1 min prn   3 mL at 09/23/21 1042     sodium chloride 0.9% infusion   Intravenous Continuous 75 mL/hr at 09/24/21 0415 New Bag at 09/24/21 0415     traZODone (DESYREL) tablet 50 mg  50 mg Oral At Bedtime   50 mg at 09/23/21 2223     No current [facility]-ordered outpatient medications on file.                  Physical Exam:   Vitals were reviewed  Patient Vitals for the past 8 hrs:   BP Temp Temp  src Pulse Resp SpO2 Weight   09/24/21 0740 119/68 98.4  F (36.9  C) Oral 88 18 92 % --   09/24/21 0719 -- -- -- -- -- -- 101.8 kg (224 lb 6.4 oz)     GEN: NAD, sitting in chair  EYES: EOMI  MOUTH: MMM  NECK: Supple  RESP: Unlabored breathing  ABD: distended  NEURO: AAO  URO: Pride in place draining clear yellow urine.  M/S: Ambulating with aid of a walker.           Data:     Lab Results   Component Value Date    NTBNPI 902 (H) 09/15/2021     Lab Results   Component Value Date    WBC 22.4 (H) 09/24/2021    WBC 19.0 (H) 09/23/2021    WBC 19.0 (H) 09/23/2021    HGB 8.2 (L) 09/24/2021    HGB 8.1 (L) 09/23/2021    HGB 7.9 (L) 09/22/2021    HCT 26.6 (L) 09/24/2021    HCT 25.8 (L) 09/23/2021    HCT 24.8 (L) 09/22/2021    MCV 96 09/24/2021    MCV 97 09/23/2021    MCV 97 09/22/2021     09/24/2021     09/23/2021     09/22/2021     Lab Results   Component Value Date    INR 1.02 09/17/2021    INR 1.13 03/23/2020     Retroperitoneal, Retroperitoneal LN BX, Lymph node, single:  -Positive for malignancy, metastatic poorly differentiated carcinoma with extensive neuroendocrine differentiation (Please see microscopic description)

## 2021-09-24 NOTE — PLAN OF CARE
Assessments: A&Ox4. VSS on RA. Neuros intact. C/o YADAV, denies SOB and N/V. Continues to have abdominal fullness/discomfort. Abdomen still distended, lots of gas with minimal stool today. Pt rated pain 7/10, prn oxy given x 2. Pride patent with ronal urine. +2 BLE edema. Upx1 with walker and gait belt.     Major shift events: Chemo therapy, tolerated well, walked the halls, long visit with friends     Treatment Plan: Cipro, pain management, monitor renal function, Heme/Onc, Uro, Neph, Palliative. Chemo therapy started today carboplatin completed, etoposide today and next 2 days    Bedside Nurse: Debi Ramirez RN

## 2021-09-25 NOTE — PROGRESS NOTES
End of Shift Summary  For vital signs and complete assessments, please see documentation flowsheets.     Pertinent assessments: Pt A&O, on RA, c/o abdominal/BLE pain Oxycodone x 2 given, denies any nausea. LS diminished. +3 BLE, scrotum, and penis. Pride in place, adequate output, loose BM x 1 this AM. Ambulated in hallway x 1 this shift.     Major Shift Events: uneventful   Treatment Plan: Continue with chemotherapy as ordered by oncology, Neuro checks, IVF, pain management, PO Cipro, supportive cares.     Bedside Nurse: Maria D Fregoso RN

## 2021-09-25 NOTE — PLAN OF CARE
End of Shift Summary  For vital signs and complete assessments, please see documentation flowsheets.     Pertinent assessments: Alert and oriented x4. Neuro assessment WNL. Pitting edema from flank/abdomen down to toes. Fluids adjusted by MD. Activity limited primarily due to edema. Pride in place, adequate urine output. Lung sounds clear, diminished. Denies shortness of breath at rest, mild dyspnea on exertion. No cough noted. Bowel sounds active, passing flatus. Small BM this afternoon. States abdominal discomfort improving. PRN oxycodone x1 for abdominal/back/bilateral leg pressure/discomfort.   Major Shift Events: Dose 3/3 etoposide given.   Treatment Plan: Monitor labs per MD. Chemo completed. Pain control. Supportive cares.

## 2021-09-25 NOTE — PLAN OF CARE
End of Shift Summary  For vital signs and complete assessments, please see documentation flowsheets.     Pertinent assessments: Alert and oriented x4. CMS intact. Lung sounds clear, diminished. Some dyspnea on exertion. Infrequent cough, IS teaching provided and encouraged. Pitting edema noted from feet up to flank, including penile edema. Bowel sounds active, passing flatus. Denies nausea; fair appetite. C/o heartburn/indigestion; MD notified this evening by writer and new orders placed.  BM x1 today; scheduled bowel meds. Pride patent with adequate urine output. Ambulated in vences x1; working with PT.   Major Shift Events: Etoposide dose 2/3 given today without issue.   Treatment Plan: Continue with chemotherapy as ordered by oncology. Neuro checks. IV fluids as ordered. PO Cipro. Supportive cares.

## 2021-09-25 NOTE — PROGRESS NOTES
[facility]    Medicine Progress Note - Hospitalist Service       Date of Admission:  9/15/2021    Assessment & Plan           Andre Serrano is a 65 year old male with a past medical history significant for prostate cancer, sleep apnea, hyperlipidemia, hypertension.  Presented to outside facility with worsening abdominal pain.  Found to have hyponatremia, constipation, and concern for metastatic disease that has now been shown to have extensive neuroendocrine carcinoma.     1.  Metastatic prostate cancer.  -Oncology following.     2.  Poorly differentiated high-grade neuroendocrine carcinoma.  Lymph node biopsied 9/17.  Oncology following.  -Chemotherapy initiated during hospital stay.  -Concern for tumor lysis syndrome.  -discontinnue IV fluids.  -Allopurinol 300 mg a day.  -Continue dexamethasone in addition to normally scheduled prednisone per oncology.     3.  Complicated urinary tract infection with chronic indwelling Pride catheter.  Urine culture from 9/15 growing Citrobacter and Enterobacter.  Had initially been on IV ceftriaxone.  Transitioned to oral ciprofloxacin on 9/18.    -Continue ciprofloxacin.     4.  Acute kidney injury on chronic kidney disease stage II.  Creatinine peaked at 2.1 during hospital stay.    Creatinine now stable at 1.45 today.  -Stop IV fluids.  -Avoid nephrotoxins as able.  -Recheck metabolic panel tomorrow.     5.  Hyponatremia.  Mild.  Sodium improved to 131 today.  -Continue to monitor metabolic panel daily.     6.  Hypophosphatemia.  At time of presentation.  Now resolved.  -Stop phosphorus replacement protocol.       7.  Hypercalcemia.  At time of presentation.  Has resolved.  Now mildly hypocalcemic  -Recheck metabolic panel tomorrow.     8.  Constipation.  -Continue scheduled and as needed stool softeners.     9.  Significant bilateral lower extremity edema.  -Continue to monitor.  -Stop IV fluids.  -Nephrology also following.  -Ambulate as  able.          Diet: Regular Diet Adult  Snacks/Supplements Adult: Ensure Enlive; With Meals    DVT Prophylaxis: Enoxaparin (Lovenox) SQ  Pride Catheter: PRESENT, indication: Strict 1-2 Hour I&O, Other (Comment) (chronic - retention)  Central Lines: None  Code Status: No CPR- Do NOT Intubate        Nadir Peralta, DO  Hospitalist Service  [facility]  Securely message with the Vocera Web Console (learn more here)  Text page via [employer] Paging/Directory      Clinically Significant Risk Factors Present on Admission                ______________________________________________________________________    Interval History   Feels her legs are more swollen today.  Occasional nausea.  Denies chest pain, shortness of breath, fevers, chills, vomiting, or diarrhea.    Data reviewed today: I reviewed all medications, new labs and imaging results over the last 24 hours.     Physical Exam   Vital Signs: Temp: 98.3  F (36.8  C) Temp src: Oral BP: 114/74 Pulse: 90   Resp: 16 SpO2: 95 % O2 Device: None (Room air)    Weight: 229 lbs 0 oz  Gen:  NAD, A&Ox3.  Eyes:  PERRL, sclera anicteric.  OP:  MMM, no lesions.  Neck:  Supple.  CV:  Regular, no murmurs.  Lung:  CTA b/l, normal effort.  Ab:  +BS, soft.  Skin:  Warm, dry to touch.  No rash.  Ext:  2+ pitting edema LE b/l.      Data   Recent Labs   Lab 09/25/21  0704 09/24/21  0706 09/23/21  0808 09/23/21  0707 09/23/21  0707 09/20/21  0722 09/19/21  0712   WBC 19.5* 22.4*  --   --  19.0*  19.0*   < > 15.7*   HGB 8.5* 8.2*  --   --  8.1*   < > 9.0*   MCV 96 96  --   --  97   < > 94    367  --   --  348   < > 405   * 130* 129*   < > 130*   < > 125*   POTASSIUM 4.1 4.2 4.3   < > 4.7   < > 4.6   CHLORIDE 103 99 97   < > 99   < > 93*   CO2 19* 21 24   < > 26   < > 25   BUN 44* 34* 31*   < > 32*   < > 20   CR 1.45* 1.44* 1.61*   < > 1.62*   < > 1.36*   ANIONGAP 9 10 8   < > 5   < > 7   JAXSON 8.1* 8.7 9.2   < > 9.0   < > 9.8   * 122* 89   < > 93    < > 97   ALBUMIN  --   --  1.9*  --   --   --  2.3*   PROTTOTAL  --   --  6.5*  --   --   --  6.9   BILITOTAL  --   --  0.4  --   --   --  0.4   ALKPHOS  --   --  169*  --   --   --  191*   ALT  --   --  17  --   --   --  20   AST  --   --  89*  --   --   --  102*    < > = values in this interval not displayed.

## 2021-09-25 NOTE — PROGRESS NOTES
[facility]    Hematology / Oncology f/u     Date of Admission:  9/15/2021    Assessment & Plan   Andre Serrano is a 65 year old male who was admitted on 9/15/2021. I was asked to see the patient for history of metastatic prostate cancer.     Assessment:  Plan:  1.  Metastatic prostate cancer with second metastasis to lymph nodes and bones:  -Initial diagnosis in March 2020: Baseline PSA > 700:   -Current treatment was ADT plus abiraterone  -PSA from 9/14/2021 was 1.4  -However CT scan findings show clear progression with massive enlargement of intra-abdominal lymphadenopathy.   - Discussed with Dr. Nguyen, the discordant findings between PSA and CT scan is worrisome for  neuroendocrine differentiation.     ---Patient completed retroperitoneal lymph node biopsy on 9/17/2021 The biopsy was positive for malignancy metastatic poorly differentiated carcinoma with extensive neuroendocrine differentiation.  -started carboplatin/etoposide c1d1 on 9/23/2021 with 25% dose reduction in etoposide to account for renal dysfunction     Plan   -Results were discussed with patient, Dr. Jasso explained that metastatic poorly differentiated carcinoma with neuroendocrine differentiation has overall poor prognosis.  In various studies patients with aggressive neuroendocrine carcinoma have overall survival ranging from 2 months to 6 to 7 months.  Response rates to chemotherapy are in the range of 45%  - We discussed treatment options: Poorly differentiated neuroendocrine tumors are treated similar to small cell lung cancer with chemotherapy regimen such as carboplatin etoposide or folfox. Dr Jasso gave him information sheet about carboplatin etoposide.  I explained side effects include severe cytopenias, renal dysfunction, neuropathy.  There is a risk of complications such as tumor lysis.  Plan was also discussed with Dr. Nguyen.   - We also discussed the option of transitioning to supportive care only.   -Following  discussion patient would like to proceed with chemotherapy.   -As patient is unlikely to be discharged home in the next few days and aggressive nature of his malignancy will start treatment while in hospital.  Patient will follow up with Dr. Nguyen to continue treatment as outpatient.  -Patient was started on allopurinol for tumor lysis prophylaxis on /922  -Started chemotherapy with carboplatin and etoposide (AUC of 5, etoposide dose reduced to 75 mg/m  due to renal function) cycle 1 Day 1 9/23.   -Proceed with cycle 1 day 3 today.  Tolerating chemo well so far.  Given concern for disease progression will stop Zytiga/Prednisone.   -tumor lysis labs daily but will minimize IV fluids due to third spacing.      2.  Bilateral hydronephrosis/ ANITA   -Patient was seen by urology.  -Worsening creatinine over the weekend.  Had bilateral ureteral stents placed 9/20.   Creatinine today 1.45.   -Nephrology following.     Plan   -Appreciate input from nephrology.  - Monitor Input /output    3.  Hypercalcemia  - IV hydration  -Had 1 dose of Zometa on 9/16/2021  -Calcium is 8.1 today.     Plan   -continue to monitor calcium levels  -labs not consistent with tumor lysis.     4.  Anemia  -Likely secondary to underlying metastatic prostate cancer    Plan   -We will transfuse with hemoglobin less than 7.0     5.  Constipation  -Likely secondary to hypercalcemia.  -Hypercalcemia corrected.  -Had a bowel movement with enema    Plan   -Continue with aggressive bowel regimen  -Please give milk of magnesia, Senokot daily till has good bowel movements  -Enema as needed.     6. GERD   -prilosec 20mg po bid added     He is due to complete chemotherapy later today.  Will continue to monitor labs for tumor lysis.  If labs show no concerns and no new issues possibly discharge Glenn morning (if okay with nephrology/hospitalist team) with plan for neulasta Monday in the clinic.  Neulasta already arranged by Dr. Nguyen to be given Monday in  the clinic.      Julián Castaneda MD   [facility]  Office:  [phone]    Code Status    No CPR- Do NOT Intubate    Reason for Consult   Reason for consult: History of metastatic prostate cancer    Primary Care Physician   Chacho Galan    Chief Complaint     Abdominal pain.       History of Present Illness    Andre Serrano is a 65 year old male who presents with abdominal pain.  Patient has past medical history significant for metastatic prostate cancer, he follows with Dr. Dariel Nguyen.  He had previous history of elevated PSA.  Diagnosis was confirmed in March 2020 when he presented with rising PSA as well as metastatic intra-abdominal lymphadenopathy, and bone metastasis.   Lymph node biopsy from 2/23/2020 confirmed metastatic adenocarcinoma.  He was initially started on ADT, and abiraterone was added in May 2020 which is his current treatment.  He has had a good PSA response going down from an initial value of 713 in March 2020 to less than 0.5 in March 2021.   Patient reported that approximately 3 months ago he started experiencing new symptoms of abdominal pain.  He describes this as distention.  He had difficulty with bowels experiencing more constipation.  Symptoms were progressive.  He met with Dr. Nguyen recently, laboratory studies show a PSA of 1.4 however his calcium was elevated and because of his abdominal symptoms a CT scan of abdomen pelvis was requested which patient completed on 9/14/2021.  The CT scan showed clear evidence of progression with extensive worsening of intra-abdominal lymphadenopathy new lung metastasis.     CT scan abdomen/pelvis dose at [facility] : Large conglomerate adenopathy is identified diroughout the retroperitoneum with regions appearing fibrotic. For example, large adenopadiy at die retroperitoneum encasing the abdominal aorta and R7C in transverse plane is 13.2 X 6.1 cm, series 3 image 39. The crariiocaudal extent is approximately 15.6 cm on  coronal series 4 image 33. This  also encases portions of the renal vessels. The adenopathy contacts and surrounds the mid ureters bilaterally. This adenopadiy also extends into the upper and bilateral pelvis. An example at the left pelvic sidewall posterior to die external iliac vessels is 2.2 x 1.5 cm. An example on die right side adjacent to the external iliac vessels is 3.5 x 2.5 cm. There are other examples. There are multiple enlarged lymph nodes also seen widiiri the mesentery that appears contiguous with die retroperitoneal adenopathy, for mstarice series 3 image 105.    Patient was supposed to go back to see Dr. Nguyen however he ended up in the ER due to worsening abdominal pain there were no beds in [facility] and he was transferred over to [facility].       Oncologic chronology    Mr. Andre Serrano is a 65-year-old pleasant man with a history of elevated PSA in 09/2019. He had multiple  urinary symptoms including hesitancy, frequency and decrease in urine flow for a while, but worsened  significantly over few days. He was supposed to have a followup September, but lost to followup and he  came to the outpatient office with increasing abdominal pain and left flank pain. He had constipation a few days,  but he was not eating much and was found to have creatinine of 9 and was admitted to the hospital on 03/20/2020.  A Pride catheter was placed and felt better. CT scan of abdomen without contrast was done and it showed moderate  bilateral hydroureteronephrosis with the hydroureter extending to the distal aspect without evidence of urolithiasis. There is moderate asymmetric left  periureteral and perinepliric stranding. Mild nodular enlargement of the prostate and dense in the base of the  urinary bladder with regular layering hyperdensity, inseparable from the nodular protrusion of the prostate. There  is lymphadenopathy, diffuse retroperitoneal and bilateral iliac chain, left periaortic  lymph nodes at the level of the  left kidney 2.1 x 2.8 cm. Left external iliac lymph node 1.9 x 3.1 cm. Multiple lymph nodes adjacent to the  inferior mesenteric vasculature measuring 1.4 x 1.6 cm. Numerous bilateral internal iliac lymphadenopathy.  Multiple faint sclerotic lesions in the visualized spine and pelvis, most prominent in the subtrochanteric right  proximal femur, intertrochanteric left proximal femur consistent with metastatic disease.    Biopsy of the pelvic lymph node showed adenocarcinoma consistent with prostate cancer.  Bone scan was done on March 23 which showed widespread metastatic disease including humeri femurs sacrum and ribs in addition to majority of the spine.    He was given first LHRH analog Firmagon at urology office on March 31, 2020. Abiraterone prednisone regimen was started in May 2020 after approval of the regimen.    PSA in July was down to 2.2 normal. Decreased further in February to 0.5 and 0.4 in April 2021.    Interval history  9/17: Patient continues to remain very uncomfortable with abdominal bloating, has not had a good bowel movement for many weeks.  Becomes nauseous when she tries to eat.     9/20: Continues to be very uncomfortable, with abdominal bloating distention.  Has gained approximately 6 pounds in water weight since yesterday.  Continues to be very swollen in his lower extremities.  Have difficulty breathing nauseous.     9/21: Had bilateral ureteral stent placed yesterday, feels significantly better today.  Continues to have abdominal distention.     9/22: Continues to have abdominal distention, swelling in legs.  No fever.  No significant change since yesterday.   9/23: Patient reported some improvement in his abdominal distention.  Has not had a bowel movement yesterday.  No fever.   9/24: Patient reported that the first day of chemotherapy was tolerated fairly well, he denies nausea abdominal pain.  Continues to have flatus had small bowel movements  abdominal distention seems to be stable.  Feet continues to be swollen.     Past Medical History   I have reviewed this patient's medical history and updated it with pertinent information if needed.   Past Medical History:   Diagnosis Date     Cancer (H)      Heavy alcohol use      Hypercholesteremia      Lower urinary tract symptoms (LUTS)      Sleep apnea     uses cpap machine at home       Past Surgical History   I have reviewed this patient's surgical history and updated it with pertinent information if needed.  Past Surgical History:   Procedure Laterality Date     APPENDECTOMY       COMBINED CYSTOSCOPY, RETROGRADES, URETEROSCOPY, INSERT STENT Bilateral 2021    Procedure: Cystoscopy, bilateral retrograde pyelograms, interpretation of fluoroscopic images, placement of 7 x 24 double-J ureteral stent;  Surgeon: Roberto Virgen MD;  Location: [facility] OR     HERNIORRHAPHY INGUINAL INFANT         Prior to Admission Medications   Prior to Admission Medications   Prescriptions Last Dose Informant Patient Reported? Taking?   Psyllium 58.12 % PACK 9/15/2021 at Unknown time Self Yes Yes   Sig: Take 1 packet by mouth 3 times daily   SENEXON-S 8.6-50 MG tablet 9/15/2021 at Unknown time  No Yes   Sig: TAKE TWO TABLETS BY MOUTH TWICE A DAY   abiraterone (ZYTIGA) 250 MG tablet 9/15/2021 at 10:30 am  Yes Yes   Sig: Take 1,000 mg by mouth daily   leuprolide (ELIGARD) 45 MG kit More than a month at 5 months ago  Yes Yes   Si mg every 6 months    ondansetron (ZOFRAN-ODT) 4 MG ODT tab Past Month at Unknown time  Yes Yes   Sig: Take 4 mg by mouth daily as needed    polyethylene glycol (MIRALAX) 17 g packet 9/15/2021 at am  Yes Yes   Sig: Take 1 packet by mouth daily   predniSONE (DELTASONE) 5 MG tablet 9/15/2021 at Unknown time  Yes Yes   Sig: Take 5 mg by mouth 2 times daily    traZODone (DESYREL) 50 MG tablet 9/15/2021 at PM  No Yes   Sig: TAKE 1-2 TABLETS AT BEDTIME      Facility-Administered Medications: None      Allergies   No Known Allergies    Social History   I have reviewed this patient's social history and updated it with pertinent information if needed. Andre Serrano  reports that he is a non-smoker but has been exposed to tobacco smoke. He has never used smokeless tobacco. He reports current alcohol use of about 16.7 standard drinks of alcohol per week. He reports current drug use. Drug: Marijuana.    Family History   I have reviewed this patient's family history and updated it with pertinent information if needed.   Family History   Problem Relation Age of Onset     Prostate Cancer Father      Brain Cancer Father      Uterine Cancer Mother      Coronary Artery Disease Maternal Grandfather      Diabetes No family hx of        Review of Systems   Review of system positive for abdominal pain constipation swelling in lower extremities.  Decreased appetite.     Physical Exam   Temp: 98.5  F (36.9  C) Temp src: Oral BP: 127/79 Pulse: 91   Resp: 16 SpO2: 96 % O2 Device: None (Room air)    Vital Signs with Ranges  Temp:  [97.6  F (36.4  C)-98.7  F (37.1  C)] 98.5  F (36.9  C)  Pulse:  [] 91  Resp:  [16-18] 16  BP: (110-127)/(63-83) 127/79  SpO2:  [94 %-99 %] 96 %  229 lbs 0 oz    Constitutional: Awake, alert, cooperative, no apparent distress. ECOG PS 2  -no thrush  GI: Soft, there is abdominal wall edema, abdomen is distended, slightly softer than since yesterday.  there is mild diffuse tenderness.   There is bilateral lower extremity edema    Data   Treatment plan reviewed with patient:

## 2021-09-26 NOTE — PROGRESS NOTES
End of Shift Summary  For vital signs and complete assessments, please see documentation flowsheets.     Pertinent assessments: Pt A&O, on RA, c/o back and BLE pain 7/10 Oxycodone x 1 given. LS diminished, YADAV. Pride in place adequate urine output. +3 BLE, scrotal edema.     Major Shift Events: uneventful    Treatment Plan: Monitor labs, pain management, supportive cares.   Bedside Nurse: Maria D Fregoso RN

## 2021-09-26 NOTE — PROGRESS NOTES
[facility]    Medicine Progress Note - Hospitalist Service       Date of Admission:  9/15/2021    Assessment & Plan           Andre Serrano is a 65 year old male with a past medical history significant for prostate cancer, sleep apnea, hyperlipidemia, hypertension.  Presented to outside facility with worsening abdominal pain.  Found to have hyponatremia, constipation, and concern for metastatic disease that has now been shown to have extensive neuroendocrine carcinoma.     1.  Metastatic prostate cancer.  -Oncology following.     2.  Poorly differentiated high-grade neuroendocrine carcinoma.  Lymph node biopsied 9/17.  Oncology following.  -Chemotherapy initiated during hospital stay.  -Concern for tumor lysis syndrome.  -discontinnued IV fluids 9/25.  -Allopurinol 300 mg a day.  -Continue prednisone.     3.  Complicated urinary tract infection with chronic indwelling Pride catheter.  Urine culture from 9/15 growing Citrobacter and Enterobacter.  Had initially been on IV ceftriaxone.  Transitioned to oral ciprofloxacin on 9/18.    -Continue ciprofloxacin.     4.  Acute kidney injury on chronic kidney disease stage II.  Creatinine peaked at 2.1 during hospital stay.    Creatinine now stable at 1.4 today.  -Stopped IV fluids on 9/25.  -Avoid nephrotoxins as able.  -Recheck metabolic panel tomorrow.     5.  Hyponatremia.  Mild.  Sodium improved to 133 today.     6.  Hypophosphatemia.  At time of presentation.  Now resolved.  -Stopped phosphorus replacement protocol 9/24.       7.  Hypercalcemia.  At time of presentation.  Has resolved.  Now mildly hypocalcemic  -Recheck metabolic panel tomorrow.     8.  Constipation.  -Continue scheduled and as needed stool softeners.     9.  Significant bilateral lower extremity edema.  -Continue to monitor.  -Stopped IV fluids 9/25.  -Give IV furosemide 20 mg once today, 9/26.  -Weigh daily.  -Strict intake and output monitoring.  -Ambulate as able.           Diet: Regular Diet Adult  Snacks/Supplements Adult: Ensure Enlive; With Meals    DVT Prophylaxis: Enoxaparin (Lovenox) SQ  Pride Catheter: PRESENT, indication: Strict 1-2 Hour I&O;Neurogenic Bladder, Other (Comment) (chronic - retention)  Central Lines: None  Code Status: No CPR- Do NOT Intubate          Nadir Peralta,   Hospitalist Service  [employer]  Securely message with the Vocera Web Console (learn more here)  Text page via [employer] Paging/Directory      Clinically Significant Risk Factors Present on Admission                ______________________________________________________________________    Interval History   Continues to have leg swelling.  Occasional shortness of breath.  Denies chest pain, fevers, chills, nausea, or vomiting.    Data reviewed today: I reviewed all medications, new labs and imaging results over the last 24 hours.     Physical Exam   Vital Signs: Temp: 98.9  F (37.2  C) Temp src: Oral BP: 137/80 Pulse: 89   Resp: 20 SpO2: 97 % O2 Device: None (Room air)    Weight: 229 lbs 0 oz  Gen:  NAD, A&Ox3.  Eyes:  PERRL, sclera anicteric.  OP:  MMM, no lesions.  Neck:  Supple.  CV:  Regular, no murmurs.  Lung:  CTA b/l, normal effort.  Ab:  +BS, soft.  Skin:  Warm, dry to touch.  No rash.  Ext:  3+ pitting edema LE b/l.      Data   Recent Labs   Lab 09/26/21  0811 09/26/21  0745 09/25/21  0704 09/24/21  0706 09/23/21  0808 09/23/21  0808   WBC 15.2*  --  19.5* 22.4*   < >  --    HGB 8.5*  --  8.5* 8.2*   < >  --    MCV 96  --  96 96   < >  --      --  393 367   < >  --    NA  --  133 131* 130*   < > 129*   POTASSIUM  --  4.3 4.1 4.2   < > 4.3   CHLORIDE  --  105 103 99   < > 97   CO2  --  19* 19* 21   < > 24   BUN  --  47* 44* 34*   < > 31*   CR  --  1.40* 1.45* 1.44*   < > 1.61*   ANIONGAP  --  9 9 10   < > 8   JAXSON  --  8.4* 8.1* 8.7   < > 9.2   GLC  --  89 118* 122*   < > 89   ALBUMIN  --   --   --   --   --  1.9*   PROTTOTAL  --   --   --   --   --  6.5*    BILITOTAL  --   --   --   --   --  0.4   ALKPHOS  --   --   --   --   --  169*   ALT  --   --   --   --   --  17   AST  --   --   --   --   --  89*    < > = values in this interval not displayed.

## 2021-09-26 NOTE — PROGRESS NOTES
Chart/labs reviewed.  Creatinine stable.  Uric acid level has remained stable.  Phosphorus normal.  Electrolytes are okay.  Urine output 1.8 L.    Nephrology will sign off.  Please call back with questions.    José Cochran MD  [facility] - Nephrology   [phone]

## 2021-09-26 NOTE — PROGRESS NOTES
[facility]    Hematology / Oncology f/u     Date of Admission:  9/15/2021    Assessment & Plan   Andre Serrano is a 65 year old male who was admitted on 9/15/2021. I was asked to see the patient for history of metastatic prostate cancer.     Assessment:  Plan:  1.  Metastatic prostate cancer with second metastasis to lymph nodes and bones:  -Initial diagnosis in March 2020: Baseline PSA > 700:   -Current treatment was ADT plus abiraterone  -PSA from 9/14/2021 was 1.4  -However CT scan findings show clear progression with massive enlargement of intra-abdominal lymphadenopathy.   - Discussed with Dr. Nguyen, the discordant findings between PSA and CT scan is worrisome for  neuroendocrine differentiation.     ---Patient completed retroperitoneal lymph node biopsy on 9/17/2021 The biopsy was positive for malignancy metastatic poorly differentiated carcinoma with extensive neuroendocrine differentiation.  -started carboplatin/etoposide c1d1 on 9/23/2021 with 25% dose reduction in etoposide to account for renal dysfunction     Plan   -Results were discussed with patient.  Dr. Jasso explained that metastatic poorly differentiated carcinoma with neuroendocrine differentiation has overall poor prognosis.  In various studies patients with aggressive neuroendocrine carcinoma have overall survival ranging from 2 months to 6 to 7 months.  Response rates to chemotherapy are in the range of 45%  - We discussed treatment options: Poorly differentiated neuroendocrine tumors are treated similar to small cell lung cancer with chemotherapy regimen such as carboplatin etoposide or folfox. Dr Jasso gave him information sheet about carboplatin etoposide.  I explained side effects include severe cytopenias, renal dysfunction, neuropathy.  There is a risk of complications such as tumor lysis.  Plan was also discussed with Dr. Nguyen.   - We also discussed the option of transitioning to supportive care only.   -Following  discussion patient would like to proceed with chemotherapy.   -Due to aggressive nature of his malignancy started chemotherapy while in hospital.  Patient will follow up with Dr. Nguyen to continue treatment as outpatient.  -Patient was started on allopurinol for tumor lysis prophylaxis on /922  -Started chemotherapy with carboplatin and etoposide (AUC of 5, etoposide dose reduced to 75 mg/m  due to renal function) cycle 1 Day 1 9/23.  Completed chemotherapy on 9/25/2021  -Tolerated chemotherapy well.  Okay for discharge from hematology/oncology stand point.  He has his sister visiting him tomorrow and would like to get discharged tomorrow.  Plan for discharge tentatively tomorrow and follow up in clinic on 9/28/2021 for labs and neulasta    2.  Bilateral hydronephrosis/ ANITA    Bilateral lower extremity edema/hydrocele  -Patient was seen by urology.  -Worsening creatinine over the past weekend.  Had bilateral ureteral stents placed 9/20.   Creatinine today stable at 1.4.   -Nephrology following    Plan   -Appreciate input from nephrology.  -could consider small dose of diuretic    3.  Hypercalcemia  - IV hydration  -Had 1 dose of Zometa on 9/16/2021  -Calcium is 8.4 today.     Plan   -continue to monitor calcium levels  -labs not consistent with tumor lysis.     4.  Anemia  -Likely secondary to underlying metastatic prostate cancer    Plan   -We will transfuse with hemoglobin less than 7.0     5.  Constipation  -Likely secondary to hypercalcemia.  -Hypercalcemia corrected.    Plan   -Continue with aggressive bowel regimen  -Please give milk of magnesia, Senokot daily till has good bowel movements  -Enema as needed.     6. GERD   -prilosec 20mg po bid added     Okay for discharge from hematology/oncology stand point.  He told me he has his sister visiting him tomorrow and would like to get discharged no earlier than tomorrow.  Plan for discharge tomorrow and follow up in clinic on 9/28/2021 for labs and  bradford Castaneda MD   [facility]  Office:  [phone]    Code Status    No CPR- Do NOT Intubate    Reason for Consult   Reason for consult: History of metastatic prostate cancer    Primary Care Physician   Chacho Galan    Chief Complaint     Abdominal pain.       History of Present Illness    Andre Serrano is a 65 year old male who presents with abdominal pain.  Patient has past medical history significant for metastatic prostate cancer, he follows with Dr. Dariel Nguyen.  He had previous history of elevated PSA.  Diagnosis was confirmed in March 2020 when he presented with rising PSA as well as metastatic intra-abdominal lymphadenopathy, and bone metastasis.   Lymph node biopsy from 2/23/2020 confirmed metastatic adenocarcinoma.  He was initially started on ADT, and abiraterone was added in May 2020 which is his current treatment.  He has had a good PSA response going down from an initial value of 713 in March 2020 to less than 0.5 in March 2021.   Patient reported that approximately 3 months ago he started experiencing new symptoms of abdominal pain.  He describes this as distention.  He had difficulty with bowels experiencing more constipation.  Symptoms were progressive.  He met with Dr. Nguyen recently, laboratory studies show a PSA of 1.4 however his calcium was elevated and because of his abdominal symptoms a CT scan of abdomen pelvis was requested which patient completed on 9/14/2021.  The CT scan showed clear evidence of progression with extensive worsening of intra-abdominal lymphadenopathy new lung metastasis.     CT scan abdomen/pelvis dose at [facility] : Large conglomerate adenopathy is identified diroughout the retroperitoneum with regions appearing fibrotic. For example, large adenopadiy at die retroperitoneum encasing the abdominal aorta and R7C in transverse plane is 13.2 X 6.1 cm, series 3 image 39. The crariiocaudal extent is approximately 15.6 cm on coronal  series 4 image 33. This  also encases portions of the renal vessels. The adenopathy contacts and surrounds the mid ureters bilaterally. This adenopadiy also extends into the upper and bilateral pelvis. An example at the left pelvic sidewall posterior to die external iliac vessels is 2.2 x 1.5 cm. An example on die right side adjacent to the external iliac vessels is 3.5 x 2.5 cm. There are other examples. There are multiple enlarged lymph nodes also seen widiiri the mesentery that appears contiguous with die retroperitoneal adenopathy, [facility] image 105.    Patient was supposed to go back to see Dr. Nguyen however he ended up in the ER due to worsening abdominal pain there were no beds in [facility] and he was transferred over to [facility].       Oncologic chronology    Mr. Andre Serrano is a 65-year-old pleasant man with a history of elevated PSA in 09/2019. He had multiple  urinary symptoms including hesitancy, frequency and decrease in urine flow for a while, but worsened  significantly over few days. He was supposed to have a followup September, but lost to followup and he  came to the outpatient office with increasing abdominal pain and left flank pain. He had constipation a few days,  but he was not eating much and was found to have creatinine of 9 and was admitted to the hospital on 03/20/2020.  A Pride catheter was placed and felt better. CT scan of abdomen without contrast was done and it showed moderate  bilateral hydroureteronephrosis with the hydroureter extending to the distal aspect without evidence of urolithiasis. There is moderate asymmetric left  periureteral and perinepliric stranding. Mild nodular enlargement of the prostate and dense in the base of the  urinary bladder with regular layering hyperdensity, inseparable from the nodular protrusion of the prostate. There  is lymphadenopathy, diffuse retroperitoneal and bilateral iliac chain, left periaortic lymph nodes  at the level of the  left kidney 2.1 x 2.8 cm. Left external iliac lymph node 1.9 x 3.1 cm. Multiple lymph nodes adjacent to the  inferior mesenteric vasculature measuring 1.4 x 1.6 cm. Numerous bilateral internal iliac lymphadenopathy.  Multiple faint sclerotic lesions in the visualized spine and pelvis, most prominent in the subtrochanteric right  proximal femur, intertrochanteric left proximal femur consistent with metastatic disease.    Biopsy of the pelvic lymph node showed adenocarcinoma consistent with prostate cancer.  Bone scan was done on March 23 which showed widespread metastatic disease including humeri femurs sacrum and ribs in addition to majority of the spine.    He was given first LHRH analog Firmagon at urology office on March 31, 2020. Abiraterone prednisone regimen was started in May 2020 after approval of the regimen.    PSA in July was down to 2.2 normal. Decreased further in February to 0.5 and 0.4 in April 2021.    Interval history  9/17: Patient continues to remain very uncomfortable with abdominal bloating, has not had a good bowel movement for many weeks.  Becomes nauseous when she tries to eat.     9/20: Continues to be very uncomfortable, with abdominal bloating distention.  Has gained approximately 6 pounds in water weight since yesterday.  Continues to be very swollen in his lower extremities.  Have difficulty breathing nauseous.     9/21: Had bilateral ureteral stent placed yesterday, feels significantly better today.  Continues to have abdominal distention.     9/22: Continues to have abdominal distention, swelling in legs.  No fever.  No significant change since yesterday.   9/23: Patient reported some improvement in his abdominal distention.  Has not had a bowel movement yesterday.  No fever.   9/24: Patient reported that the first day of chemotherapy was tolerated fairly well, he denies nausea abdominal pain.  Continues to have flatus had small bowel movements abdominal distention  seems to be stable.  Feet continues to be swollen.     Past Medical History   I have reviewed this patient's medical history and updated it with pertinent information if needed.   Past Medical History:   Diagnosis Date     Cancer (H)      Heavy alcohol use      Hypercholesteremia      Lower urinary tract symptoms (LUTS)      Sleep apnea     uses cpap machine at home       Past Surgical History   I have reviewed this patient's surgical history and updated it with pertinent information if needed.  Past Surgical History:   Procedure Laterality Date     APPENDECTOMY       COMBINED CYSTOSCOPY, RETROGRADES, URETEROSCOPY, INSERT STENT Bilateral 2021    Procedure: Cystoscopy, bilateral retrograde pyelograms, interpretation of fluoroscopic images, placement of 7 x 24 double-J ureteral stent;  Surgeon: Roberto Virgen MD;  Location: [facility] OR     HERNIORRHAPHY INGUINAL INFANT         Prior to Admission Medications   Prior to Admission Medications   Prescriptions Last Dose Informant Patient Reported? Taking?   Psyllium 58.12 % PACK 9/15/2021 at Unknown time Self Yes Yes   Sig: Take 1 packet by mouth 3 times daily   SENEXON-S 8.6-50 MG tablet 9/15/2021 at Unknown time  No Yes   Sig: TAKE TWO TABLETS BY MOUTH TWICE A DAY   abiraterone (ZYTIGA) 250 MG tablet 9/15/2021 at 10:30 am  Yes Yes   Sig: Take 1,000 mg by mouth daily   leuprolide (ELIGARD) 45 MG kit More than a month at 5 months ago  Yes Yes   Si mg every 6 months    ondansetron (ZOFRAN-ODT) 4 MG ODT tab Past Month at Unknown time  Yes Yes   Sig: Take 4 mg by mouth daily as needed    polyethylene glycol (MIRALAX) 17 g packet 9/15/2021 at am  Yes Yes   Sig: Take 1 packet by mouth daily   predniSONE (DELTASONE) 5 MG tablet 9/15/2021 at Unknown time  Yes Yes   Sig: Take 5 mg by mouth 2 times daily    traZODone (DESYREL) 50 MG tablet 9/15/2021 at PM  No Yes   Sig: TAKE 1-2 TABLETS AT BEDTIME      Facility-Administered Medications: None     Allergies   No  Known Allergies    Social History   I have reviewed this patient's social history and updated it with pertinent information if needed. Andre Serrano  reports that he is a non-smoker but has been exposed to tobacco smoke. He has never used smokeless tobacco. He reports current alcohol use of about 16.7 standard drinks of alcohol per week. He reports current drug use. Drug: Marijuana.    Family History   I have reviewed this patient's family history and updated it with pertinent information if needed.   Family History   Problem Relation Age of Onset     Prostate Cancer Father      Brain Cancer Father      Uterine Cancer Mother      Coronary Artery Disease Maternal Grandfather      Diabetes No family hx of        Review of Systems   Review of system positive for abdominal pain constipation swelling in lower extremities.  Decreased appetite.     Physical Exam   Temp: 98  F (36.7  C) Temp src: Oral BP: 130/74 Pulse: 80   Resp: 18 SpO2: 96 % O2 Device: None (Room air)    Vital Signs with Ranges  Temp:  [97.7  F (36.5  C)-98.7  F (37.1  C)] 98  F (36.7  C)  Pulse:  [74-93] 80  Resp:  [16-18] 18  BP: (114-139)/(74-83) 130/74  SpO2:  [95 %-96 %] 96 %  229 lbs 0 oz    Constitutional: Awake, alert, cooperative, no apparent distress. ECOG PS 2  -no thrush  GI: Soft, there is abdominal wall edema, abdomen is distended, slightly softer than since yesterday.  there is mild diffuse tenderness.   There is bilateral lower extremity edema    Data   Treatment plan reviewed with patient:

## 2021-09-26 NOTE — PLAN OF CARE
End of Shift Summary  For vital signs and complete assessments, please see documentation flowsheets.     Pertinent assessments: Alert and oriented x4. Neuro exam WNL aside from slight decrease in sensation to shins bilaterally due to +3-4 edema. Pride patent with clear, yellow urine this evening. Large increase in urine output after one time dose of Lasix this shift. Lung sounds diminished; dyspnea on exertion. No shortness of breath at rest. Bowel sounds active, passing flatus. Denies nausea. C/o abdominal discomfort and pressure in legs due to edema; no PRNs utilized. Up with SBA and walker/gait belt; ambulating halls multiple times per shift.   Major Shift Events: None  Treatment Plan: Monitor labs per MD orders. Encourage activity. Elevate extremities when possible. Monitor urine output. Supportive cares.

## 2021-09-27 NOTE — DISCHARGE SUMMARY
[facility]  Hospitalist Discharge Summary      Date of Admission:  9/15/2021  Date of Discharge:  9/27/2021  Discharging Provider: Nadir Peralta DO      Discharge Diagnoses   1.  Metastatic prostate cancer.  2.  High-grade neuroendocrine carcinoma.  3.  Complicated urinary tract infection due to chronic indwelling Pride catheter with Citrobacter and Enterobacter growing in cultures.  4.  Acute kidney injury on chronic kidney disease stage II.  5.  Hyponatremia.  6.  Hypophosphatemia.  7.  Hypercalcemia.  8.  Severe bilateral lower extremity edema.  9.  Constipation.    Follow-ups Needed After Discharge   Follow-up Appointments     Follow-up and recommended labs and tests       Follow up with Dr. Tracey within 7 days for hospital follow- up. The   following labs/tests are recommended: Basic metabolic panel in 4 days.             Discharge Disposition   Discharged to home  Condition at discharge: Stable      Hospital Course   Andre Serrano is a 65 year old male with a past medical history significant for prostate cancer, sleep apnea, hyperlipidemia, hypertension.  Presented to outside facility with worsening abdominal pain.  Found to have hyponatremia, constipation, and concern for metastatic disease that has now been shown to have extensive neuroendocrine carcinoma.  He was started on new chemotherapy during hospital stay.  He was noted to have acute kidney injury.  There was concern for possible tumor lysis syndrome.  He has been on aggressive IV fluids.  Also started on allopurinol.  Did have 2 days of increased areas with dexamethasone.  IV fluids will be stopped.  He did have a dose of IV furosemide on 9/26.  We will continue with oral furosemide 20 mg a day for the next 1 week.  Repeat metabolic panel in 4 days.  Further evaluation by oncology within 1 week.  Reassess need for continued diuresis at that time.      Consultations This Hospital Stay   UROLOGY IP  CONSULT  HEMATOLOGY & ONCOLOGY IP CONSULT  NUTRITION SERVICES ADULT IP CONSULT  UROLOGY IP CONSULT  PALLIATIVE CARE ADULT IP CONSULT  PHYSICAL THERAPY ADULT IP CONSULT  UROLOGY IP CONSULT  NUTRITION SERVICES ADULT IP CONSULT  CARE MANAGEMENT / SOCIAL WORK IP CONSULT  NEPHROLOGY IP CONSULT  ADVANCE DIRECTIVE IP CONSULT    Code Status   No CPR- Do NOT Intubate    Time Spent on this Encounter   I spent 40 minutes with Mr. Serrano and working on discharge on 9/27/2021.       Nadir Peralta [place] [address] [facility]  [address] [place] [address]  Phone: [phone]  Fax: [phone]  ______________________________________________________________________    Physical Exam   Vital Signs: Temp: 98.5  F (36.9  C) Temp src: Oral BP: 125/73 Pulse: 95   Resp: 20 SpO2: 96 % O2 Device: None (Room air)    Weight: 223 lbs 0 oz  Gen:  NAD, A&Ox3.  Eyes:  PERRL, sclera anicteric.  OP:  MMM, no lesions.  Neck:  Supple.  CV:  Regular, no murmurs.  Lung:  CTA b/l, normal effort.  Ab:  +BS, soft.  Skin:  Warm, dry to touch.  No rash.  Ext:  3+ pitting edema LE b/l.         Primary Care Physician   Chacho Galan    Discharge Orders      Basic metabolic panel     Reason for your hospital stay    Neuroendocrine carcinoma     Follow-up and recommended labs and tests     Follow up with Dr. Tracey within 7 days for hospital follow- up. The following labs/tests are recommended: Basic metabolic panel in 4 days.     Activity    Your activity upon discharge: activity as tolerated     Walker Order for DME - ONLY FOR DME    I, the undersigned, certify that the above prescribed supplies are medically necessary for this patient and is both reasonable and necessary in reference to accepted standards of medical and necessary in reference to accepted standards of medical practice in the treatment of this patient's condition and is not prescribed as a convenience.      Diet    Follow this diet upon  discharge: Regular     Infusion Appointment Request         Discharge Medications   Current Discharge Medication List      START taking these medications    Details   allopurinol (ZYLOPRIM) 300 MG tablet Take 1 tablet (300 mg) by mouth daily  Qty: 30 tablet, Refills: 0    Associated Diagnoses: Neuroendocrine carcinoma, high grade (H)      ciprofloxacin (CIPRO) 500 MG tablet Take 1 tablet (500 mg) by mouth every 12 hours  Qty: 2 tablet, Refills: 0    Associated Diagnoses: Neuroendocrine carcinoma, high grade (H)      furosemide (LASIX) 20 MG tablet Take 1 tablet (20 mg) by mouth daily  Qty: 30 tablet, Refills: 0    Associated Diagnoses: Neuroendocrine carcinoma, high grade (H)      gabapentin (NEURONTIN) 100 MG capsule Take 1 capsule (100 mg) by mouth 2 times daily  Qty: 60 capsule, Refills: 0    Associated Diagnoses: Neuroendocrine carcinoma, high grade (H)      omeprazole (PRILOSEC) 20 MG DR capsule Take 1 capsule (20 mg) by mouth 2 times daily (before meals)  Qty: 60 capsule, Refills: 0    Associated Diagnoses: Neuroendocrine carcinoma, high grade (H)         CONTINUE these medications which have NOT CHANGED    Details   ondansetron (ZOFRAN-ODT) 4 MG ODT tab Take 4 mg by mouth daily as needed       polyethylene glycol (MIRALAX) 17 g packet Take 1 packet by mouth daily      predniSONE (DELTASONE) 5 MG tablet Take 5 mg by mouth 2 times daily       Psyllium 58.12 % PACK Take 1 packet by mouth 3 times daily      SENEXON-S 8.6-50 MG tablet TAKE TWO TABLETS BY MOUTH TWICE A DAY  Qty: 60 tablet, Refills: 3    Associated Diagnoses: Acute renal failure, unspecified acute renal failure type (H)      traZODone (DESYREL) 50 MG tablet TAKE 1-2 TABLETS AT BEDTIME  Qty: 60 tablet, Refills: 4    Associated Diagnoses: Sleep disorder         STOP taking these medications       abiraterone (ZYTIGA) 250 MG tablet Comments:   Reason for Stopping:         leuprolide (ELIGARD) 45 MG kit Comments:   Reason for Stopping:              Allergies   No Known Allergies

## 2021-09-27 NOTE — PLAN OF CARE
End of Shift Summary  For vital signs and complete assessments, please see documentation flowsheets.     Pertinent assessments: Pt A&O, on RA, c/o abdominal/back pain 8/10 Oxycodone x 1 this shift. LS diminished. BM x1, cooper in place, adequate urine output. LE/scrotal edema +3. All questions answered by pt and sister. All belongings sent home with pt. All medications given to patient (home meds and meds from pharmacy).    Major Shift Events: Discharged home with sister.

## 2021-09-27 NOTE — PROGRESS NOTES
[facility]    Hematology / Oncology f/u     Date of Admission:  9/15/2021    Assessment & Plan   Andre Serrano is a 65 year old male who was admitted on 9/15/2021. I was asked to see the patient for history of metastatic prostate cancer.     Assessment:  Plan:  1.  Metastatic prostate cancer with second metastasis to lymph nodes and bones:  -Initial diagnosis in March 2020: Baseline PSA > 700:   -Current treatment was ADT plus abiraterone  -PSA from 9/14/2021 was 1.4  -However CT scan findings show clear progression with massive enlargement of intra-abdominal lymphadenopathy.   - Discussed with Dr. Nguyen, the discordant findings between PSA and CT scan is worrisome for  neuroendocrine differentiation.     ---Patient completed retroperitoneal lymph node biopsy on 9/17/2021 The biopsy was positive for malignancy metastatic poorly differentiated carcinoma with extensive neuroendocrine differentiation.  -Overall patient tolerated well, he did not experience much nausea.  Appetite has been preserved he feels his abdomen is less distended.  Patient has been able to walk around.     Plan   -Results were discussed with patient, I explained that metastatic poorly differentiated carcinoma with neuroendocrine differentiation has overall poor prognosis.  In various cities patients with aggressive neuroendocrine carcinoma have overall survival ranging from 2 months to 6 to 7 months.  Response rates to chemotherapy are in the range of 45%  - We discussed treatment options: Poorly differentiated neuroendocrine tumors are treated similar to small cell lung cancer with chemotherapy regimen such as carboplatin etoposide or folfox. I gave him information sheet about carboplatin etoposide.  I explained side effects include severe cytopenias, renal dysfunction, neuropathy.  There is a risk of complications such as tumor lysis.  Plan was also discussed with Dr. Nguyen.   - We also discussed the option of transitioning  to supportive care only.   -Following discussion patient would like to proceed with chemotherapy.   -As patient is unlikely to be discharged home in the next few days and aggressive nature of his malignancy will start treatment while in hospital.  Patient will follow up with Dr. Nguyen to continue treatment as outpatient.  -Patient was started on allopurinol for tumor lysis prophylaxis on /922  -Started chemotherapy with carboplatin and etoposide (AUC of 5, etoposide dose reduced to 75 mg/m  due to renal function) cycle 1 Day 1 9/23.  Completed chemotherapy cycle on 9/25/2021.     -Await labs from today: Is stable potential discharge home.   -Patient will need Neulasta in clinic.  Neulasta is approved and scheduled for 3 PM for pt in [facility] today.   -Please discharge home with 1 month supply of allopurinol 300 mg daily.   -Follow-up with Dr. Nguyen.       2.  Bilateral hydronephrosis/ ANITA   -Patient was seen by urology.  -Worsening creatinine over the weekend.  Had bilateral ureteral stents placed 9/20.   Creatinine today 1.4. Improved from yesterday.    -Nephrology following.     Plan   -Appreciate input from nephrology.  - Monitor Input /output      3.  Hypercalcemia  - IV hydration  -Had 1 dose of Zometa on 9/16/2021  -Calcium has normalized    Plan   -However, adjust IV fluids as per nephrology recommendations.     4.  Anemia  -Likely secondary to underlying metastatic prostate cancer    Plan   -We will transfuse with hemoglobin less than 7.0     5.  Constipation  -Likely secondary to hypercalcemia.  -Hypercalcemia corrected.  -Had a bowel movement with enema    Plan   -Continue with aggressive bowel regimen  -Please give milk of magnesia, Senokot daily till has good bowel movements  -Enema as needed.       Christo Jasso MD   [facility]  Office:  [phone]    Code Status    No CPR- Do NOT Intubate    Reason for Consult   Reason for consult: History of metastatic prostate cancer    Primary Care  Physician   Chacho Galan    Chief Complaint     Abdominal pain.       History of Present Illness    Andre Serrano is a 65 year old male who presents with abdominal pain.  Patient has past medical history significant for metastatic prostate cancer, he follows with Dr. Dariel Nguyen.  He had previous history of elevated PSA.  Diagnosis was confirmed in March 2020 when he presented with rising PSA as well as metastatic intra-abdominal lymphadenopathy, and bone metastasis.   Lymph node biopsy from 2/23/2020 confirmed metastatic adenocarcinoma.  He was initially started on ADT, and abiraterone was added in May 2020 which is his current treatment.  He has had a good PSA response going down from an initial value of 713 in March 2020 to less than 0.5 in March 2021.   Patient reported that approximately 3 months ago he started experiencing new symptoms of abdominal pain.  He describes this as distention.  He had difficulty with bowels experiencing more constipation.  Symptoms were progressive.  He met with Dr. Nguyen recently, laboratory studies show a PSA of 1.4 however his calcium was elevated and because of his abdominal symptoms a CT scan of abdomen pelvis was requested which patient completed on 9/14/2021.  The CT scan showed clear evidence of progression with extensive worsening of intra-abdominal lymphadenopathy new lung metastasis.     CT scan abdomen/pelvis dose at [facility] : Large conglomerate adenopathy is identified diroughout the retroperitoneum with regions appearing fibrotic. For example, large adenopadiy at die retroperitoneum encasing the abdominal aorta and R7C in transverse plane is 13.2 X 6.1 cm, series 3 image 39. The crariiocaudal extent is approximately 15.6 cm on coronal series 4 image 33. This  also encases portions of the renal vessels. The adenopathy contacts and surrounds the mid ureters bilaterally. This adenopadiy also extends into the upper and bilateral pelvis. An  example at the left pelvic sidewall posterior to die external iliac vessels is 2.2 x 1.5 cm. An example on die right side adjacent to the external iliac vessels is 3.5 x 2.5 cm. There are other examples. There are multiple enlarged lymph nodes also seen widiiri the mesentery that appears contiguous with die retroperitoneal adenopathy, [facility] series 3 image 105.    Patient was supposed to go back to see Dr. Nguyen however he ended up in the ER due to worsening abdominal pain there were no beds in [facility] and he was transferred over to [facility].       Oncologic chronology    Mr. Andre Serrano is a 65-year-old pleasant man with a history of elevated PSA in 09/2019. He had multiple  urinary symptoms including hesitancy, frequency and decrease in urine flow for a while, but worsened  significantly over few days. He was supposed to have a followup September, but lost to followup and he  came to the outpatient office with increasing abdominal pain and left flank pain. He had constipation a few days,  but he was not eating much and was found to have creatinine of 9 and was admitted to the hospital on 03/20/2020.  A Pride catheter was placed and felt better. CT scan of abdomen without contrast was done and it showed moderate  bilateral hydroureteronephrosis with the hydroureter extending to the distal aspect without evidence of urolithiasis. There is moderate asymmetric left  periureteral and perinepliric stranding. Mild nodular enlargement of the prostate and dense in the base of the  urinary bladder with regular layering hyperdensity, inseparable from the nodular protrusion of the prostate. There  is lymphadenopathy, diffuse retroperitoneal and bilateral iliac chain, left periaortic lymph nodes at the level of the  left kidney 2.1 x 2.8 cm. Left external iliac lymph node 1.9 x 3.1 cm. Multiple lymph nodes adjacent to the  inferior mesenteric vasculature measuring 1.4 x 1.6 cm. Numerous bilateral  internal iliac lymphadenopathy.  Multiple faint sclerotic lesions in the visualized spine and pelvis, most prominent in the subtrochanteric right  proximal femur, intertrochanteric left proximal femur consistent with metastatic disease.    Biopsy of the pelvic lymph node showed adenocarcinoma consistent with prostate cancer.  Bone scan was done on March 23 which showed widespread metastatic disease including humeri femurs sacrum and ribs in addition to majority of the spine.    He was given first [facility] analog Firmagon at urology office on March 31, 2020. Abiraterone prednisone regimen was started in May 2020 after approval of the regimen.    PSA in July was down to 2.2 normal. Decreased further in February to 0.5 and 0.4 in April 2021.    Interval history  9/17: Patient continues to remain very uncomfortable with abdominal bloating, has not had a good bowel movement for many weeks.  Becomes nauseous when she tries to eat.     9/20: Continues to be very uncomfortable, with abdominal bloating distention.  Has gained approximately 6 pounds in water weight since yesterday.  Continues to be very swollen in his lower extremities.  Have difficulty breathing nauseous.     9/21: Had bilateral ureteral stent placed yesterday, feels significantly better today.  Continues to have abdominal distention.     9/22: Continues to have abdominal distention, swelling in legs.  No fever.  No significant change since yesterday.   9/23: Patient reported some improvement in his abdominal distention.  Has not had a bowel movement yesterday.  No fever.   9/24: Patient reported that the first day of chemotherapy was tolerated fairly well, he denies nausea abdominal pain.  Continues to have flatus had small bowel movements abdominal distention seems to be stable.  Feet continues to be swollen.     9/27: Patient tolerated chemotherapy well over the weekend.  Denies any nausea, appetite is well preserved.  Patient having bowel movement and  passing gas.  Abdominal distention still there but improved.  Similarly leg swelling is there but has improved.  Patient been able to walk around with walker.     Past Medical History   I have reviewed this patient's medical history and updated it with pertinent information if needed.   Past Medical History:   Diagnosis Date     Cancer (H)      Heavy alcohol use      Hypercholesteremia      Lower urinary tract symptoms (LUTS)      Sleep apnea     uses cpap machine at home       Past Surgical History   I have reviewed this patient's surgical history and updated it with pertinent information if needed.  Past Surgical History:   Procedure Laterality Date     APPENDECTOMY       COMBINED CYSTOSCOPY, RETROGRADES, URETEROSCOPY, INSERT STENT Bilateral 2021    Procedure: Cystoscopy, bilateral retrograde pyelograms, interpretation of fluoroscopic images, placement of 7 x 24 double-J ureteral stent;  Surgeon: Roberto Virgen MD;  Location: [facility] OR     HERNIORRHAPHY INGUINAL INFANT         Prior to Admission Medications   Prior to Admission Medications   Prescriptions Last Dose Informant Patient Reported? Taking?   Psyllium 58.12 % PACK 9/15/2021 at Unknown time Self Yes Yes   Sig: Take 1 packet by mouth 3 times daily   SENEXON-S 8.6-50 MG tablet 9/15/2021 at Unknown time  No Yes   Sig: TAKE TWO TABLETS BY MOUTH TWICE A DAY   abiraterone (ZYTIGA) 250 MG tablet 9/15/2021 at 10:30 am  Yes Yes   Sig: Take 1,000 mg by mouth daily   leuprolide (ELIGARD) 45 MG kit More than a month at 5 months ago  Yes Yes   Si mg every 6 months    ondansetron (ZOFRAN-ODT) 4 MG ODT tab Past Month at Unknown time  Yes Yes   Sig: Take 4 mg by mouth daily as needed    polyethylene glycol (MIRALAX) 17 g packet 9/15/2021 at am  Yes Yes   Sig: Take 1 packet by mouth daily   predniSONE (DELTASONE) 5 MG tablet 9/15/2021 at Unknown time  Yes Yes   Sig: Take 5 mg by mouth 2 times daily    traZODone (DESYREL) 50 MG tablet 9/15/2021 at PM  No  Yes   Sig: TAKE 1-2 TABLETS AT BEDTIME      Facility-Administered Medications: None     Allergies   No Known Allergies    Social History   I have reviewed this patient's social history and updated it with pertinent information if needed. Andre Serrano  reports that he is a non-smoker but has been exposed to tobacco smoke. He has never used smokeless tobacco. He reports current alcohol use of about 16.7 standard drinks of alcohol per week. He reports current drug use. Drug: Marijuana.    Family History   I have reviewed this patient's family history and updated it with pertinent information if needed.   Family History   Problem Relation Age of Onset     Prostate Cancer Father      Brain Cancer Father      Uterine Cancer Mother      Coronary Artery Disease Maternal Grandfather      Diabetes No family hx of        Review of Systems   Review of system positive for abdominal pain constipation swelling in lower extremities.  Decreased appetite.     Physical Exam   Temp: 98.5  F (36.9  C) Temp src: Oral BP: 125/73 Pulse: 95   Resp: 20 SpO2: 96 % O2 Device: None (Room air)    Vital Signs with Ranges  Temp:  [98.3  F (36.8  C)-98.9  F (37.2  C)] 98.5  F (36.9  C)  Pulse:  [76-95] 95  Resp:  [20-24] 20  BP: (110-137)/(69-80) 125/73  SpO2:  [95 %-97 %] 96 %  223 lbs 0 oz    Constitutional: Awake, alert, cooperative, no apparent distress. ECOG PS 2  -Mucous membranes dry.   GI: Soft, there is abdominal wall edema, abdomen is distended, improved since admission .   There is bilateral lower extremity edema    Data   Treatment plan reviewed with patient:

## 2021-09-27 NOTE — PROGRESS NOTES
Care Management Discharge Note    Discharge Date: 09/27/2021       Discharge Disposition: Home Care    Discharge Services: RN/PT/OT     Discharge DME: Walker    Discharge Transportation: family or friend will provide    Education Provided on the Discharge Plan:  Pt  Persons Notified of Discharge Plans: Pt  Patient/Family in Agreement with the Plan: yes    Additional Information:  Pt will discharge today with [facility] care RN/PT/OT and the support of friends.     Friends will provide transport.    Maria D David RN BSN   Inpatient Care Coordination  [facility]  [phone]        Syibl David, RN

## 2021-09-27 NOTE — PLAN OF CARE
Physical Therapy Discharge Summary    Reason for therapy discharge:    All goals and outcomes met, no further needs identified.    Progress towards therapy goal(s). See goals on Care Plan in [facility] electronic health record for goal details.  Goals met    Therapy recommendation(s):    Pt will receive home health PT to continue progressing independence with functional mobility activities.  Pt will utilize a rolling walker for transfers and gait for safety.  Pt does report having assistance available from family and friends upon dismissal home.

## 2021-09-27 NOTE — PROGRESS NOTES
End of Shift Summary  For vital signs and complete assessments, please see documentation flowsheets.     Pertinent assessments: Pt A&O, on RA, c/o abdominal/back pain 8/10 Oxycodone x 2 this shift. LS diminished, YADAV. Loose stools x 2 shift, cooper in place, adequate urine output. LE/scrotal edema +3.    Major Shift Events: None  Treatment Plan: Monitor labs per MD orders, encourage activity, elevate extremities when possible. monitor urine output, weight, pain management, supportive cares  Bedside Nurse: Maria D Fregoso RN

## 2021-10-08 NOTE — CONFIDENTIAL NOTE
Leslye -  with [facility] - called today to introduce herself and let Dr. Galan know that patient has accepted case management services. Patient has metastatic prostate cancer and is currently receiving help from sister at his home, but she does need to return to her home in [place] soon. eLslye is working to get him connected with resources for care after sister leaves.   Per 10/4/21 oncology note, patient has started chemo.   Leslye is aware of this and has a call into oncology to connect with them to collaborate.   Leslye asks that we note her contact information in patient's chart and call if we think she can help in any way.   Dr. Galan notified.

## 2021-10-12 PROBLEM — N18.30 CHRONIC KIDNEY DISEASE, STAGE 3 (H): Status: ACTIVE | Noted: 2021-01-01

## 2021-10-12 NOTE — PROGRESS NOTES
Hospital Follow-up Visit:    Hospital/Nursing Home/IP Rehab Facility: [facility]  Date of Admission: 9/15/21  Date of Discharge: 9/27/21  Reason(s) for Admission: Worsening Abdominal Pain      Was your hospitalization related to COVID-19? No   Problems taking medications regularly:  None  Medication changes since discharge: Stopped: Eligard & Zytiga - Started: Allopurinol, Cipro, Lasix, Gabapentin, Omeprazole  Problems adhering to non-medication therapy:  None    Summary of hospitalization:  [facility] discharge summary reviewed  Diagnostic Tests/Treatments reviewed.  Follow up needed: none  Other Healthcare Providers Involved in Patient s Care:         None  Update since discharge: improved. Post Discharge Medication Reconciliation: discharge medications reconciled, continue medications without change.  Plan of care communicated with patient     Problem(s) Oriented visit      ROS:  5 point ROS completed and negative except noted above, including Gen, CV, Resp, GI, MS     HISTORY:   reports current alcohol use of about 16.7 standard drinks of alcohol per week.   reports that he is a non-smoker but has been exposed to tobacco smoke. He has never used smokeless tobacco.    Past Medical History:   Diagnosis Date     Cancer (H)      Heavy alcohol use      Hypercholesteremia      Lower urinary tract symptoms (LUTS)      Sleep apnea      Past Surgical History:   Procedure Laterality Date     APPENDECTOMY  1972     COMBINED CYSTOSCOPY, RETROGRADES, URETEROSCOPY, INSERT STENT Bilateral 9/20/2021    Procedure: Cystoscopy, bilateral retrograde pyelograms, interpretation of fluoroscopic images, placement of 7 x 24 double-J ureteral stent;  Surgeon: Roberto Virgen MD;  Location: [facility] OR     HERNIORRHAPHY INGUINAL INFANT         EXAM:  BP: 104/64   Pulse: 62    Temp: Data Unavailable    Wt Readings from Last 2 Encounters:   10/12/21 82.1 kg (181 lb)   09/27/21 101.2 kg (223 lb)      "  BMI= Body mass index is 26.72 kg/m .    EXAM:  APPEARANCE: = moderate distress  Neck = No anterior or posterior adenopathy appreciated.  Resp effort = Calm regular breathing  Abdomen: soft, nontender, no masses, & bowel sounds = distended, hypoactive bowel sounds      Assessment/Plan:  Andre was seen today for hospital f/u.    Diagnoses and all orders for this visit:    Bone metastasis (H)    Other constipation  -     bisacodyl (DULCOLAX) 10 MG suppository; Place 1 suppository (10 mg) rectally daily as needed for constipation  -     Psyllium 58.12 % PACK; Take 1 packet by mouth daily    Need for vaccination  -     ADMIN INFLUENZA VIRUS VAC    Stage 3a chronic kidney disease (H)    Other orders  -     [place] FLU VACCINE, INCREASED ANTIGEN, PRESV FREE    LONG DISCUSSION patient given 1-6 months,  Palliative provider coming in over the next few days.  Said our goodbyes in case.  Hope that chemo will be palliative and give him some extra time.      COUNSELING:   reports that he is a non-smoker but has been exposed to tobacco smoke. He has never used smokeless tobacco.    Estimated body mass index is 26.72 kg/m  as calculated from the following:    Height as of 9/23/21: 1.753 m (5' 9.02\").    Weight as of this encounter: 82.1 kg (181 lb).       Appropriate preventive services were discussed with this patient, including applicable screening as appropriate for cardiovascular disease, diabetes, osteopenia/osteoporosis, and glaucoma.  As appropriate for age/gender, discussed screening for colorectal cancer, prostate cancer, breast cancer, and cervical cancer. Checklist reviewing preventive services available has been given to the patient.    Reviewed patients plan of care and provided an AVS. The Basic Care Plan (routine screening as documented in Health Maintenance) for Andre meets the Care Plan requirement. This Care Plan has been established and reviewed with the  Patient.      The following health maintenance items are " reviewed in Epic and correct as of today:  Health Maintenance   Topic Date Due     Pneumococcal Vaccine: 65+ Years (2 of 4 - PPSV23) 06/25/2020     MEDICARE ANNUAL WELLNESS VISIT  09/10/2020     INFLUENZA VACCINE (1) 09/01/2021     FALL RISK ASSESSMENT  03/25/2022     COLORECTAL CANCER SCREENING  01/21/2023     DTAP/TDAP/TD IMMUNIZATION (2 - Td or Tdap) 06/25/2025     LIPID  03/25/2026     ADVANCE CARE PLANNING  09/24/2026     HEPATITIS C SCREENING  Completed     PHQ-2  Completed     ZOSTER IMMUNIZATION  Completed     AORTIC ANEURYSM SCREENING (SYSTEM ASSIGNED)  Completed     COVID-19 Vaccine  Completed     IPV IMMUNIZATION  Aged Out     MENINGITIS IMMUNIZATION  Aged Out     HEPATITIS B IMMUNIZATION  Aged Out       Chacho Galan  [facility]  For any issues my office # is [phone]

## 2021-10-19 NOTE — PROGRESS NOTES
Infusion Nursing Note:  Andre Serrano presents today for 1 unit pRBC's.    Patient seen by provider today: No   present during visit today: Not Applicable.    Note: Patient has never had a blood transfusion before.  Consent signed 10/18/21.  No heart history noted.  Patient arrives via WC with his sister.  Patient can only ambulate short distances, therefore patient's sister asked to stay to help with patient care.      Intravenous Access:  Peripheral IV placed.    Treatment Conditions:  Blood transfusion consent signed 10/18/21.  hgb 6.7 drawn at [facility] on 10/18/21.      Post Infusion Assessment:  Patient tolerated infusion without incident.  Blood return noted pre and post infusion.  Site patent and intact, free from redness, edema or discomfort.  No evidence of extravasations.  Access discontinued per protocol.       Discharge Plan:   Discharge instructions reviewed with: Patient and Family.  Patient and/or family verbalized understanding of discharge instructions and all questions answered.  Copy of AVS reviewed with patient and/or family.  Patient will return PRN for next appointment.  Patient discharged in stable condition accompanied by: sister.  Departure Mode: Wheelchair.      Rogelio Crowley RN

## 2021-10-21 NOTE — OP NOTE
DATE OF PROCEDURE:  October 21, 2021     SURGEON:  Jin Fabian MD   FIRST ASSIST: Melida Florence PA-C      PREOPERATIVE DIAGNOSIS:  prostate cancer      POSTOPERATIVE DIAGNOSIS:  Same       PROCEDURE:  Placement of [place] PowerPort implantable port, left subclavian vein.       ANESTHESIA:  Local with lidocaine 1% without epinephrine and sedation.       INDICATIONS:  Patient will undergo chemotherapy and a PowerPort is indicated for venous access.       DESCRIPTION OF PROCEDURE:  The patient was brought to the OR and placed in supine position.  The patient was placed into Trendelenburg.  IV sedation was given.  The neck and upper chest were prepared and draped in the usual fashion using ChloraPrep.  Local anesthesia was performed with lidocaine 1% without epinephrine. The left subclavian vein was punctured easily with a 16-gauge needle on the first attempt.  There was excellent blood return.  A guidewire was advanced through the needle without any resistance.  The needle was removed.  A transverse incision was made along the guidewire.  Subcutaneous pocket was made inferior to the incision.  Hemostasis was verified and was excellent.  An introducer with a peel-away sheath was introduced into the vein over the guidewire.  Guidewire and introducer were then removed.  The catheter was advanced through the peel-away sheath without resistance. The peel-away sheath was removed.  The catheter was placed at 19.5 cm at the skin level.  The catheter was cut and connected to the port.  The port was placed in the subcutaneous pocket.  The port was accessed with a non-coring needle.  There was excellent blood return, PowerPort was finally flushed with 10 mL of solution of heparin flush.  The incision was closed in the usual fashion.  A chest x-ray performed in the operating room and showed the catheter was in excellent position and the PowerPort is ready to be used.           JIN FABIAN MD

## 2021-10-21 NOTE — ANESTHESIA CARE TRANSFER NOTE
Patient: Andre Serrano    Procedure: Procedure(s):  POWER PORT PLACEMENT       Diagnosis: Prostate cancer (H) [C61]  Diagnosis Additional Information: No value filed.    Anesthesia Type:   MAC     Note:    Oropharynx: oropharynx clear of all foreign objects  Level of Consciousness: awake  Oxygen Supplementation: room air    Independent Airway: airway patency satisfactory and stable  Dentition: dentition unchanged  Vital Signs Stable: post-procedure vital signs reviewed and stable  Report to RN Given: handoff report given  Patient transferred to: PACU    Handoff Report: Identifed the Patient, Identified the Reponsible Provider, Reviewed the pertinent medical history, Discussed the surgical course, Reviewed Intra-OP anesthesia mangement and issues during anesthesia, Set expectations for post-procedure period and Allowed opportunity for questions and acknowledgement of understanding      Vitals:  Vitals Value Taken Time   BP     Temp     Pulse     Resp 22 10/21/21 1410   SpO2 100 % 10/21/21 1410   Vitals shown include unvalidated device data.    Electronically Signed By: DOE Cantu CRNA  October 21, 2021  2:11 PM

## 2021-10-21 NOTE — DISCHARGE INSTRUCTIONS
Same Day Surgery Discharge Instructions for  Sedation and General Anesthesia       It's not unusual to feel dizzy, light-headed or faint for up to 24 hours after surgery or while taking pain medication.  If you have these symptoms: sit for a few minutes before standing and have someone assist you when you get up to walk or use the bathroom.      You should rest and relax for the next 24 hours. We recommend you make arrangements to have an adult stay with you for at least 24 hours after your discharge.  Avoid hazardous and strenuous activity.      DO NOT DRIVE any vehicle or operate mechanical equipment for 24 hours following the end of your surgery.  Even though you may feel normal, your reactions may be affected by the medication you have received.      Do not drink alcoholic beverages for 24 hours following surgery.       Slowly progress to your regular diet as you feel able. It's not unusual to feel nauseated and/or vomit after receiving anesthesia.  If you develop these symptoms, drink clear liquids (apple juice, ginger ale, broth, 7-up, etc. ) until you feel better.  If your nausea and vomiting persists for 24 hours, please notify your surgeon.        All narcotic pain medications, along with inactivity and anesthesia, can cause constipation. Drinking plenty of liquids and increasing fiber intake will help.      For any questions of a medical nature, call your surgeon.      Do not make important decisions for 24 hours.      If you had general anesthesia, you may have a sore throat for a couple of days related to the breathing tube used during surgery.  You may use Cepacol lozenges to help with this discomfort.  If it worsens or if you develop a fever, contact your surgeon.       If you feel your pain is not well managed with the pain medications prescribed by your surgeon, please contact your surgeon's office to let them know so they can address your concerns.       CoVid 19 Information    We want to give you  information regarding Covid. Please consult your primary care provider with any questions you might have.     Patient who have symptoms (cough, fever, or shortness of breath), need to isolate for 7 days from when symptoms started OR 72 hours after fever resolves (without fever reducing medications) AND improvement of respiratory symptoms (whichever is longer).      Isolate yourself at home (in own room/own bathroom if possible)    Do Not allow any visitors    Do Not go to work or school    Do Not go to [employer],  centers, shopping, or other public places.    Do Not shake hands.    Avoid close and intimate contact with others (hugging, kissing).    Follow CDC recommendations for household cleaning of frequently touched services.     After the initial 7 days, continue to isolate yourself from household members as much as possible. To continue decrease the risk of community spread and exposure, you and any members of your household should limit activities in public for 14 days after starting home isolation.     You can reference the following CDC link for helpful home isolation/care tips:  https://www.cdc.gov/coronavirus/2019-ncov/downloads/10Things.pdf    Protect Others:    Cover Your Mouth and Nose with a mask, disposable tissue or wash cloth to avoid spreading germs to others.    Wash your hands and face frequently with soap and water    Call Your Primary Doctor If: Breathing difficulty develops or you become worse.    For more information about COVID19 and options for caring for yourself at home, please visit the CDC website at https://www.cdc.gov/coronavirus/2019-ncov/about/steps-when-sick.html  For more options for care at [facility], please visit our website at [email or web address]        Discharge Instructions for Power Port Placement      General Instructions:    You will be given a card with information about your port.  You should carry this with you in your  wallet/billfold. It provides information to clinicians about your port.  You should also carry the card in case your port triggers any security devices.     Activity:    Limit your activity for the first few days after your port is placed    Incision Care:     Unless otherwise directed by your surgeon, the dressing may removed tomorrow.      If a topical skin adhesive was used to close incision, you may shower tomorrow. Do not use soaps, lotions, or ointments on the wound area. Do not scrub the wound. After bathing, pat the wound dry with a soft towel.  Do not scratch, rub, or pick at the strips or film. Do not place tape directly over the strips or film.  Do not apply liquids (such as peroxide), ointments, or creams to the wound while the strips or film are in place.    Call your surgeon if you have:     Redness, swelling or drainage from incision     A fever of 101 F or greater.      Nausea or vomiting.     Pain that is not controlled by medications and/or rest.     Questions or concerns.        **If you have questions or concerns about your procedure,  call Dr. Fabian at [phone]**

## 2021-10-21 NOTE — ANESTHESIA POSTPROCEDURE EVALUATION
Patient: Andre Serrano    Procedure: Procedure(s):  POWER PORT PLACEMENT       Diagnosis:Prostate cancer (H) [C61]  Diagnosis Additional Information: No value filed.    Anesthesia Type:  MAC    Note:  Disposition: Outpatient   Postop Pain Control: Uneventful            Sign Out: Well controlled pain   PONV: No   Neuro/Psych: Uneventful            Sign Out: Acceptable/Baseline neuro status   Airway/Respiratory: Uneventful            Sign Out: Acceptable/Baseline resp. status   CV/Hemodynamics: Uneventful            Sign Out: Acceptable CV status   Other NRE: NONE   DID A NON-ROUTINE EVENT OCCUR? No           Last vitals:  Vitals Value Taken Time   /71 10/21/21 1430   Temp 36.6  C (97.8  F) 10/21/21 1409   Pulse 87 10/21/21 1431   Resp 9 10/21/21 1431   SpO2 100 % 10/21/21 1431   Vitals shown include unvalidated device data.    Electronically Signed By: Ebenezer Garcia MD  October 21, 2021  2:32 PM

## 2021-10-21 NOTE — OR NURSING
Discharge instructions reviewed with patient's sister Aviva via phone. Printed form sent with patient. Neither have any further questions.

## 2021-10-21 NOTE — ANESTHESIA PREPROCEDURE EVALUATION
Anesthesia Pre-Procedure Evaluation    Patient: Andre Serrano   MRN: [id number] : 1955        Preoperative Diagnosis: Prostate cancer (H) [C61]    Procedure : Procedure(s):  POWER PORT PLACEMENT          Past Medical History:   Diagnosis Date     Anemia      Cancer (H)      Constipation      Heavy alcohol use      Hypercholesteremia      Lower urinary tract symptoms (LUTS)      Obese      Sleep apnea     uses cpap machine at home      Past Surgical History:   Procedure Laterality Date     APPENDECTOMY  1972     BIOPSY  2021    RETROPERITONEAL LYMPH NODE BIOPSY     COMBINED CYSTOSCOPY, RETROGRADES, URETEROSCOPY, INSERT STENT Bilateral 2021    Procedure: Cystoscopy, bilateral retrograde pyelograms, interpretation of fluoroscopic images, placement of 7 x 24 double-J ureteral stent;  Surgeon: Roberto Virgen MD;  Location: [facility] OR     GENITOURINARY SURGERY  2021     HERNIORRHAPHY INGUINAL INFANT        No Known Allergies   Social History     Tobacco Use     Smoking status: Passive Smoke Exposure - Never Smoker     Smokeless tobacco: Never Used     Tobacco comment: occasion   Substance Use Topics     Alcohol use: Yes     Alcohol/week: 16.7 standard drinks     Types: 20 Cans of beer per week      Wt Readings from Last 1 Encounters:   10/19/21 83.5 kg (184 lb)        Anesthesia Evaluation   Pt has had prior anesthetic. Type: General.    No history of anesthetic complications       ROS/MED HX  ENT/Pulmonary:     (+) sleep apnea,  (-) tobacco use   Neurologic:       Cardiovascular:     (+) Dyslipidemia -----Previous cardiac testing   Echo: Date: Results:    Stress Test: Date: Results:    ECG Reviewed: Date: 9/15/21 Results:  SR w RBBB  Cath: Date: Results:      METS/Exercise Tolerance:     Hematologic:     (+) anemia,     Musculoskeletal:       GI/Hepatic:       Renal/Genitourinary:     (+) renal disease (Stage 3), type: ARF and CRI,     Endo:     (+) Obesity (Class 1),      Psychiatric/Substance Use:     (+) alcohol abuse H/O chronic opiod use .     Infectious Disease:       Malignancy:   (+) Malignancy, History of Prostate and Other.Other CA Neuroendocrine status post.    Other:      (+) , H/O Chronic Pain,           OUTSIDE LABS:  CBC:   Lab Results   Component Value Date    WBC 44.8 (H) 10/11/2021    WBC 15.2 (H) 09/26/2021    HGB 8.0 (L) 10/11/2021    HGB 8.5 (L) 09/26/2021    HCT 25.2 (L) 10/11/2021    HCT 27.5 (L) 09/26/2021     10/11/2021     09/26/2021     BMP:   Lab Results   Component Value Date     09/27/2021     09/26/2021    POTASSIUM 3.8 09/27/2021    POTASSIUM 4.3 09/26/2021    CHLORIDE 108 09/27/2021    CHLORIDE 105 09/26/2021    CO2 20 09/27/2021    CO2 19 (L) 09/26/2021    BUN 40 (H) 09/27/2021    BUN 47 (H) 09/26/2021    CR 1.28 (H) 09/27/2021    CR 1.40 (H) 09/26/2021     (H) 09/27/2021    GLC 89 09/26/2021     COAGS:   Lab Results   Component Value Date    INR 1.02 09/17/2021     POC: No results found for: BGM, HCG, HCGS  HEPATIC:   Lab Results   Component Value Date    ALBUMIN 1.9 (L) 09/23/2021    PROTTOTAL 6.5 (L) 09/23/2021    ALT 17 09/23/2021    AST 89 (H) 09/23/2021    ALKPHOS 169 (H) 09/23/2021    BILITOTAL 0.4 09/23/2021     OTHER:   Lab Results   Component Value Date    LACT 1.2 09/21/2021    JAXSON 8.1 (L) 09/27/2021    PHOS 2.9 09/26/2021    MAG 1.9 09/26/2021    LIPASE 95 09/15/2021       Anesthesia Plan    ASA Status:  3      Anesthesia Type: MAC.     - Reason for MAC: straight local not clinically adequate              Consents    Anesthesia Plan(s) and associated risks, benefits, and realistic alternatives discussed. Questions answered and patient/representative(s) expressed understanding.     - Discussed with:  Patient         Postoperative Care    Pain management: IV analgesics, Multi-modal analgesia.   PONV prophylaxis: Ondansetron (or other 5HT-3)     Comments:                Donavon Pinedo MD

## 2025-01-13 NOTE — PROGRESS NOTES
01/13/25 10:02 AM    Patient contacted post ED visit, [facility] department spoke with patient/caregiver and outreach was successful.    Thank you.  Nancie Cosby MA  [facility]     UROLOGY BRIEF NOTE  CLINICALLY STABLE,  FEELS BETTER,V.S.S., MILD HEMATURIA, CREAT IMPROVING  FROM 9 TO 3 RANGE. SEEN BY ONCOLOGY. WILL NEED A BONE SCAN AND PROSTATE BIOPSY  WHICH WILL  BETTER GRADE THE TUMOR.   P.E. NAD, NO BONE PAIN, DORETHA DIET SLEPT WELL. REVIEWED FINDINGS.  A- URINARY RETENTION       ACUTE RENAL FAILURE       SUSPECTED MET CAP       ELEVATED PSA    P- SCHEDULE BONE SCAN. CAN ARRANGE FO BX - OUTPATIENT IN THE OFFICE EARLY WEEK.DISCHARGE IN AM . START ABIRATERONE AND LATER ELIGARD.  FOLLOWING

## (undated) DEVICE — SU VICRYL 4-0 PS-2 18" UND J496H

## (undated) DEVICE — ADH SKIN CLOSURE PREMIERPRO EXOFIN 1.0ML 3470

## (undated) DEVICE — PREP CHLORAPREP W/ORANGE TINT 10.5ML 930715

## (undated) DEVICE — LINEN FULL SHEET 5511

## (undated) DEVICE — CATH FOLEY COUDE TIEMAN 16FR 5ML LUBRICATH LATEX 0102L16

## (undated) DEVICE — PREP TECHNI-CARE CHLOROXYLENOL 3% 4OZ BOTTLE C222-4ZWO

## (undated) DEVICE — RAD RX ISOVUE 300 (50ML) 61% IOPAMIDOL CHARGE PER ML

## (undated) DEVICE — SYR 10ML SLIP TIP W/O NDL

## (undated) DEVICE — TUBING IRRIG TUR Y TYPE 96" LF 6543-01

## (undated) DEVICE — SOL WATER IRRIG 1000ML BOTTLE 2F7114

## (undated) DEVICE — DECANTER VIAL 2006S

## (undated) DEVICE — GLOVE PROTEXIS W/NEU-THERA 7.5  2D73TE75

## (undated) DEVICE — SU VICRYL 3-0 SH 27" J316H

## (undated) DEVICE — SOL NACL 0.9% IRRIG 1000ML BOTTLE 2F7124

## (undated) DEVICE — DECANTER BAG 2002S

## (undated) DEVICE — SOL NACL 0.9% INJ 250ML BAG 2B1322Q

## (undated) DEVICE — LINEN GOWN OVERSIZE 5408

## (undated) DEVICE — PACK MINOR SBA15MIFSE

## (undated) DEVICE — LINEN TOWEL PACK X5 5464

## (undated) DEVICE — GOWN IMPERVIOUS ZONED LG

## (undated) DEVICE — ESU ELEC BLADE 2.75" COATED/INSULATED E1455

## (undated) DEVICE — BAG CLEAR TRASH 1.3M 39X33" P4040C

## (undated) DEVICE — GLOVE PROTEXIS POWDER FREE SMT 7.5  2D72PT75X

## (undated) DEVICE — ESU GROUND PAD E7506

## (undated) DEVICE — PAD CHUX UNDERPAD 30X36" P3036C

## (undated) DEVICE — DRAPE SHEET REV FOLD 3/4 9349

## (undated) DEVICE — ESU PENCIL W/HOLSTER E2350H

## (undated) DEVICE — PACK CYSTO CUSTOM RIDGES

## (undated) DEVICE — NDL 19GA 1.5"

## (undated) DEVICE — DRAPE LAP TRANSVERSE 29421

## (undated) DEVICE — LINEN HALF SHEET 5512

## (undated) DEVICE — CATH URETERAL FLEX TIP TIGERTAIL 06FRX70CM 139006

## (undated) RX ORDER — ONDANSETRON 2 MG/ML
INJECTION INTRAMUSCULAR; INTRAVENOUS
Status: DISPENSED
Start: 2021-01-01

## (undated) RX ORDER — FENTANYL CITRATE 50 UG/ML
INJECTION, SOLUTION INTRAMUSCULAR; INTRAVENOUS
Status: DISPENSED
Start: 2021-01-01

## (undated) RX ORDER — NALOXONE HYDROCHLORIDE 0.4 MG/ML
INJECTION, SOLUTION INTRAMUSCULAR; INTRAVENOUS; SUBCUTANEOUS
Status: DISPENSED
Start: 2020-03-23

## (undated) RX ORDER — CEFAZOLIN SODIUM 1 G/3ML
INJECTION, POWDER, FOR SOLUTION INTRAMUSCULAR; INTRAVENOUS
Status: DISPENSED
Start: 2021-01-01

## (undated) RX ORDER — PROPOFOL 10 MG/ML
INJECTION, EMULSION INTRAVENOUS
Status: DISPENSED
Start: 2021-01-01

## (undated) RX ORDER — DEXAMETHASONE SODIUM PHOSPHATE 4 MG/ML
INJECTION, SOLUTION INTRA-ARTICULAR; INTRALESIONAL; INTRAMUSCULAR; INTRAVENOUS; SOFT TISSUE
Status: DISPENSED
Start: 2021-01-01

## (undated) RX ORDER — LIDOCAINE HYDROCHLORIDE 10 MG/ML
INJECTION, SOLUTION EPIDURAL; INFILTRATION; INTRACAUDAL; PERINEURAL
Status: DISPENSED
Start: 2021-01-01

## (undated) RX ORDER — FENTANYL CITRATE 50 UG/ML
INJECTION, SOLUTION INTRAMUSCULAR; INTRAVENOUS
Status: DISPENSED
Start: 2020-03-23

## (undated) RX ORDER — FLUMAZENIL 0.1 MG/ML
INJECTION, SOLUTION INTRAVENOUS
Status: DISPENSED
Start: 2020-03-23

## (undated) RX ORDER — CEFAZOLIN SODIUM/WATER 2 G/20 ML
SYRINGE (ML) INTRAVENOUS
Status: DISPENSED
Start: 2021-01-01